# Patient Record
Sex: FEMALE | Race: WHITE | NOT HISPANIC OR LATINO | Employment: OTHER | URBAN - METROPOLITAN AREA
[De-identification: names, ages, dates, MRNs, and addresses within clinical notes are randomized per-mention and may not be internally consistent; named-entity substitution may affect disease eponyms.]

---

## 2017-01-11 ENCOUNTER — TELEPHONE (OUTPATIENT)
Dept: FAMILY MEDICINE | Facility: CLINIC | Age: 77
End: 2017-01-11

## 2017-01-11 NOTE — TELEPHONE ENCOUNTER
Sorry to send back.   Before we call patient---thoughts regarding patient's last question--request that patient's granddaughter and granddaughter's spouse est care with you??    Thank you,  Sumaya Doe RN, BSN

## 2017-01-11 NOTE — TELEPHONE ENCOUNTER
Placed call.  Information given to patient from PCP.  States understood and agreed with all of the advise.  Sumaya Doe RN

## 2017-01-11 NOTE — TELEPHONE ENCOUNTER
Reason for Call:  Other call back    Detailed comments: pt is looking to speak with nurse in regards to her taking her bp this morning just has some further questions in regards    Phone Number Patient can be reached at: Home number on file [phone] (home)    Best Time: anytime    Can we leave a detailed message on this number? YES    Call taken on 1/11/2017 at 10:56 AM by Thea Lemus

## 2017-01-11 NOTE — TELEPHONE ENCOUNTER
Agree her anxiety is raising hr and blood pressure and no worries with her readings, not to worry about stroke    Ok to continue vit d, but either way is fine    Office visit 3 months    Gonzales Ayoub M.D.

## 2017-01-11 NOTE — TELEPHONE ENCOUNTER
"PCP:     Patient calling with update on BP readings.   Has been taking her BP at home with an automatic cuff daily.   Recently started checking her BP twice daily and waiting 5-10 minutes between readings.   Today her BP was 136/85, after 10 minutes was 136/82    States that prior to this she noticed that her second readings were always lower than her initial readings.   \"My BP has dropped 10-15 points between the first and second reading\"  Last Saturday  systolic, after 10 minutes 128/82 HR 96.  Another reading started at 163/91, after 10 minutes 147/86 HR 97    Wants to know why her heartrate is so high.   States that before she checks her BP her heartrate is always 62-78    Has been climbing her stairs at home more.  Walking 15-30 minutes a day.   Feels like she has more energy.     Denies any chest pain, headache, vision change, dizziness.     Started crying a bit as she was talking. States that she is very fearful of the blood pressure cuff, doesn't like coming to the dr or talking to the clinic on the phone, \"it all makes me nervous\". Wonders if she should check her BP before she takes her meds.  States that her dad had a massive stroke and she is very fearful of having an elevated BP.  Encouraged Patient that she is doing a good job in being proactive with her health.     Advised that her anxiousness with the BP cuff is probably what is elevating her first BP reading and causing her HR to be elevated as it is not elevated when she is not checking it with her BP cuff. Advised to continue to check her BP an hour afer taking her BP medication (this is what she has been doing). Encouraged her to continue with her walking/exercising as she has been doing.     Wants to know:  1.What would be a good BP reading for her?  2.When does the PCP want to see her again or should she just continue to check her BP's at home?    Reviewed her medications with her and she states that she is not taking the Vitamin D because " her Vitamin D level was normal.   Advised that in this climate people typically do not get enough vitamin D naturally. She states that she was advised by PCP to take 1500IU of Vitamin D and that her endocrinologist said to take 2000IU.   3.Wants to know if she should be taking Vitamin D again and at what dose?    4.Also would like to know if PCP would see her granddaughter and granddaughter's  as new patients (age 40's). She states that they are both in good health but should have a PCP for physicals.     Please advise    Thank you    Elyssa Fuentes RN

## 2017-01-16 ENCOUNTER — TELEPHONE (OUTPATIENT)
Dept: FAMILY MEDICINE | Facility: CLINIC | Age: 77
End: 2017-01-16

## 2017-01-16 NOTE — TELEPHONE ENCOUNTER
PCP:    Pt takes her Losartan everyday at noon. Pt called in reporting a lot of anxiety surrounding her blood pressure.   Pt states her BP has been lower than normal this AM.    Pt reports her AM BP's have been:142/73, 130/81, 114/71,112/68. Pt takes BP multiple times per day.     Advised Pt these are healthy blood pressures and that taking her BP once per day is sufficient unless it is abnormally high or low.   Advised Pt she can take her Losartan today as scheduled, however Pt wants confirmation from PCP.     Malika Gill RN

## 2017-01-16 NOTE — TELEPHONE ENCOUNTER
Called and relayed the below information to the Pt. Pt agrees with plan of care.     Malika Gill RN

## 2017-01-16 NOTE — TELEPHONE ENCOUNTER
Reason for Call:  Call back    Detailed comments: Patient call and would like a call back from a nurse concerning her BP    Phone Number Patient can be reached at: Home number on file [phone] (home)    Best Time: before noon    Can we leave a detailed message on this number?YES    Call taken on 1/16/2017 at 9:00 AM by Candace Connor

## 2017-02-16 ENCOUNTER — TELEPHONE (OUTPATIENT)
Dept: FAMILY MEDICINE | Facility: CLINIC | Age: 77
End: 2017-02-16

## 2017-02-16 NOTE — TELEPHONE ENCOUNTER
Patient calling and states she took her BP today 20 minutes ago and was 163/94.  States sat for 15 minutes watching TV prior to taking and had taken the losartan today at noon.  Had 2 cups of caffeine today prior to BP reading as well.      Reports taking Losartan at noon daily as ordered since Jan. 8th.  Dieting, cutting down on sodium and walking.    Denies: headaches, blurred vision, nausea, vomiting, chest pain, severe weakness, dizziness, lightheadedness, does report family history of heart disease and strokes.    Patient very anxious about elevated number.  Reassurance provided regarding lack of symptoms, caffeine constriction and anxiety related to BP will elevate some.  Advised to do some relaxation.   Patient questioning if machine is working well.  Advised could do a nurse only BP check in office to verify readings for her concern.      Please advise with follow up recommendations for patient.   Mary Murphy RN  [employer]

## 2017-02-16 NOTE — TELEPHONE ENCOUNTER
Patient notified with information noted below from provider and agrees with plan.  Patient did not schedule appointment due to needing to find a ride and will call when she has ride available to schedule her BP check.  Mary Murphy RN  [employer]

## 2017-02-16 NOTE — TELEPHONE ENCOUNTER
Reason for call:  Patient reporting a symptom    Symptom or request: high BP    Duration (how long have symptoms been present): 24 hours    Have you been treated for this before? Yes    Additional comments:     Phone Number patient can be reached at:  Home number on file [phone] (home)    Best Time:      Can we leave a detailed message on this number:  YES    Call taken on 2/16/2017 at 1:53 PM by Jamila Price

## 2017-03-03 ENCOUNTER — ALLIED HEALTH/NURSE VISIT (OUTPATIENT)
Dept: NURSING | Facility: CLINIC | Age: 77
End: 2017-03-03
Payer: COMMERCIAL

## 2017-03-03 VITALS — DIASTOLIC BLOOD PRESSURE: 86 MMHG | SYSTOLIC BLOOD PRESSURE: 138 MMHG

## 2017-03-03 DIAGNOSIS — I10 BENIGN ESSENTIAL HYPERTENSION: Primary | ICD-10-CM

## 2017-03-03 PROCEDURE — 99207 ZZC NO CHARGE NURSE ONLY: CPT

## 2017-03-03 NOTE — PROGRESS NOTES
Arline Link is a 76 year old female who comes in today for a Blood Pressure check because of ongoing blood pressure monitoring.    *Document pulse and BP  *Use new set of vitals button for multiple readings.  *Use extended vitals for orthostatic    Vitals as recorded, a regular cuff was used.    Patient is taking medication as prescribed  Patient is tolerating medications well.  Patient is monitoring Blood Pressure at home.  Average readings if yes are varied.  Patient states she was told to take her bp every day, which is really causing her anxiety to increase so she does not want to do it daily.  We agreed on 1 or 2 times a week instead.  She was very anxious and almost in tears.  She scheduled an appointment for a f/u in March.     Current complaints: none    Disposition: follow-up as indicated by MD/AP, results routed to MD/AP, patient reminded to call as needed and patient to continue with the same medication.     Berkley Navas MA

## 2017-03-03 NOTE — MR AVS SNAPSHOT
"              After Visit Summary   3/3/2017    Arline Link    MRN: [id number]           Patient Information     Date Of Birth          1940        Visit Information        Provider Department      3/3/2017 1:15 PM CS NURSE [facility]        Today's Diagnoses     Benign essential hypertension    -  1       Follow-ups after your visit        Your next 10 appointments already scheduled     Mar 17, 2017  3:00 PM CDT   [facility]    [address] [facility] [address] [phone]           Bring a current list of meds and any records pertaining to this visit.  For Physicals, please bring immunization records and any forms needing to be filled out.  Please arrive 10 minutes early to complete paperwork.              Who to contact     If you have questions or need follow up information about today's clinic visit or your schedule please contact [facility] directly at [phone].  Normal or non-critical lab and imaging results will be communicated to you by [employer], letter or phone within 4 business days after the clinic has received the results. If you do not hear from us within 7 days, please contact the clinic through [employer] or phone. If you have a critical or abnormal lab result, we will notify you by phone as soon as possible.  Submit refill requests through [employer] or call your pharmacy and they will forward the refill request to us. Please allow 3 business days for your refill to be completed.          Additional Information About Your Visit        [employer] Information     [employer] lets you send messages to your doctor, view your test results, renew your prescriptions, schedule appointments and more. To sign up, go to [email or web address] . Click on \"Log in\" on the left side of the screen, which will take you to the Welcome page. Then click on \"Sign up Now\" on the right side of the page. "     You will be asked to enter the access code listed below, as well as some personal information. Please follow the directions to create your username and password.     Your access code is: [email or web address]  Expires: 3/16/2017 10:54 AM     Your access code will  in 90 days. If you need help or a new code, please call your [facility] clinic or [phone].        Care EveryWhere ID     This is your Care EveryWhere ID. This could be used by other organizations to access your [facility] medical records  [id number]         Blood Pressure from Last 3 Encounters:   17 138/86   16 180/90   16 145/80    Weight from Last 3 Encounters:   16 172 lb 1.6 oz (78.1 kg)   16 174 lb (78.9 kg)   01/30/15 167 lb (75.8 kg)              Today, you had the following     No orders found for display       Primary Care Provider Office Phone # Fax #    Gonzales Hector Ayoub [phone]       [facility] [address] [facility] [address]  [place] [address]        Thank you!     Thank you for choosing [facility]  for your care. Our goal is always to provide you with excellent care. Hearing back from our patients is one way we can continue to improve our services. Please take a few minutes to complete the written survey that you may receive in the mail after your visit with us. Thank you!             Your Updated Medication List - Protect others around you: Learn how to safely use, store and throw away your medicines at www.disposemymeds.org.          This list is accurate as of: 3/3/17  1:37 PM.  Always use your most recent med list.                   Brand Name Dispense Instructions for use    losartan 50 MG tablet    COZAAR    90 tablet    Take 1 tablet (50 mg) by mouth daily       SYNTHROID PO      Take 50 mcg by mouth       VITAMIN D (CHOLECALCIFEROL) PO      Take 1,500 Units by mouth daily

## 2017-03-10 ENCOUNTER — TELEPHONE (OUTPATIENT)
Dept: FAMILY MEDICINE | Facility: CLINIC | Age: 77
End: 2017-03-10

## 2017-03-10 NOTE — TELEPHONE ENCOUNTER
I would doubt the losartan but if persists or worsens let me know, otherwise office visit    Gonzales Ayoub M.D.

## 2017-03-10 NOTE — TELEPHONE ENCOUNTER
I called patient and spoke with her  Relayed provider message.  She is also trying to reach out to Endo doctor to see if the synthroid is causing these aches.   She also reports of not taking Vitamin D daily.   She takes it very infrequently.   Patient wondering if lack of Vitamin D could be causing these symptoms  Told patient I would run past Dr. Ayoub and see.     Please advise.     Nat Castanon RN

## 2017-03-10 NOTE — TELEPHONE ENCOUNTER
Reason for Call:  Other call back    Detailed comments: pt is calling to speak with nurse in regards to losartan (COZAAR) 50 MG tablet has questions    Phone Number Patient can be reached at: Home number on file [phone] (home)    Best Time: anytime    Can we leave a detailed message on this number? YES    Call taken on 3/10/2017 at 10:51 AM by Thea Lemus

## 2017-03-10 NOTE — TELEPHONE ENCOUNTER
I called and spoke with patient.   She said she will be better about taking Vitamin D. She will take 2,000 units per day  Also advised she could try Tylenol for body aches as well.   Patient said she will give it a try.    Nat Castanon RN

## 2017-03-10 NOTE — TELEPHONE ENCOUNTER
Spoke with patient.  Bilateral aching lower arms, worse in morning.  Pain improves over the course of the day, but never completely resolves.  Duration: about 2 weeks ago  BP has been running 140s-150s/xx. BP in clinic last Friday was 138/86.  Denies chest pain, SOB, lightheadedness and dizziness.  She is scheduled to see PCP 3/31.    She wonders if arm aching is due to thyroid issue or losartan?   States thyroid tests were done 1 month ago by [facility] and were normal.    Malika Odonnell RN

## 2017-04-14 ENCOUNTER — OFFICE VISIT (OUTPATIENT)
Dept: FAMILY MEDICINE | Facility: CLINIC | Age: 77
End: 2017-04-14
Payer: COMMERCIAL

## 2017-04-14 VITALS
TEMPERATURE: 97.6 F | OXYGEN SATURATION: 97 % | BODY MASS INDEX: 28.54 KG/M2 | WEIGHT: 161.1 LBS | DIASTOLIC BLOOD PRESSURE: 86 MMHG | HEIGHT: 63 IN | SYSTOLIC BLOOD PRESSURE: 166 MMHG | HEART RATE: 71 BPM

## 2017-04-14 DIAGNOSIS — I10 BENIGN ESSENTIAL HYPERTENSION: ICD-10-CM

## 2017-04-14 DIAGNOSIS — M79.10 MYALGIA: Primary | ICD-10-CM

## 2017-04-14 DIAGNOSIS — E55.9 VITAMIN D DEFICIENCY: ICD-10-CM

## 2017-04-14 DIAGNOSIS — E03.9 HYPOTHYROIDISM, UNSPECIFIED TYPE: ICD-10-CM

## 2017-04-14 LAB
BASOPHILS # BLD AUTO: 0.1 10E9/L (ref 0–0.2)
BASOPHILS NFR BLD AUTO: 0.6 %
CRP SERPL-MCNC: 38 MG/L (ref 0–8)
DIFFERENTIAL METHOD BLD: NORMAL
EOSINOPHIL # BLD AUTO: 0 10E9/L (ref 0–0.7)
EOSINOPHIL NFR BLD AUTO: 0.5 %
ERYTHROCYTE [DISTWIDTH] IN BLOOD BY AUTOMATED COUNT: 12.5 % (ref 10–15)
ERYTHROCYTE [SEDIMENTATION RATE] IN BLOOD BY WESTERGREN METHOD: 27 MM/H (ref 0–30)
HCT VFR BLD AUTO: 42.2 % (ref 35–47)
HGB BLD-MCNC: 14 G/DL (ref 11.7–15.7)
LYMPHOCYTES # BLD AUTO: 1.4 10E9/L (ref 0.8–5.3)
LYMPHOCYTES NFR BLD AUTO: 17.6 %
MCH RBC QN AUTO: 30.3 PG (ref 26.5–33)
MCHC RBC AUTO-ENTMCNC: 33.2 G/DL (ref 31.5–36.5)
MCV RBC AUTO: 91 FL (ref 78–100)
MONOCYTES # BLD AUTO: 0.9 10E9/L (ref 0–1.3)
MONOCYTES NFR BLD AUTO: 11 %
NEUTROPHILS # BLD AUTO: 5.8 10E9/L (ref 1.6–8.3)
NEUTROPHILS NFR BLD AUTO: 70.3 %
PLATELET # BLD AUTO: 404 10E9/L (ref 150–450)
RBC # BLD AUTO: 4.62 10E12/L (ref 3.8–5.2)
WBC # BLD AUTO: 8.2 10E9/L (ref 4–11)

## 2017-04-14 PROCEDURE — 80053 COMPREHEN METABOLIC PANEL: CPT | Performed by: INTERNAL MEDICINE

## 2017-04-14 PROCEDURE — 82306 VITAMIN D 25 HYDROXY: CPT | Performed by: INTERNAL MEDICINE

## 2017-04-14 PROCEDURE — 36415 COLL VENOUS BLD VENIPUNCTURE: CPT | Performed by: INTERNAL MEDICINE

## 2017-04-14 PROCEDURE — 86140 C-REACTIVE PROTEIN: CPT | Performed by: INTERNAL MEDICINE

## 2017-04-14 PROCEDURE — 85652 RBC SED RATE AUTOMATED: CPT | Performed by: INTERNAL MEDICINE

## 2017-04-14 PROCEDURE — 82550 ASSAY OF CK (CPK): CPT | Performed by: INTERNAL MEDICINE

## 2017-04-14 PROCEDURE — 85025 COMPLETE CBC W/AUTO DIFF WBC: CPT | Performed by: INTERNAL MEDICINE

## 2017-04-14 PROCEDURE — 84443 ASSAY THYROID STIM HORMONE: CPT | Performed by: INTERNAL MEDICINE

## 2017-04-14 PROCEDURE — 99214 OFFICE O/P EST MOD 30 MIN: CPT | Performed by: INTERNAL MEDICINE

## 2017-04-14 NOTE — NURSING NOTE
"Chief Complaint   Patient presents with     RECHECK       Initial Pulse 71  Temp 97.6  F (36.4  C) (Oral)  Ht 5' 2.75\" (1.594 m)  Wt 161 lb 1.6 oz (73.1 kg)  SpO2 97%  Breastfeeding? No  BMI 28.77 kg/m2 Estimated body mass index is 28.77 kg/(m^2) as calculated from the following:    Height as of this encounter: 5' 2.75\" (1.594 m).    Weight as of this encounter: 161 lb 1.6 oz (73.1 kg).  Medication Reconciliation: complete.  Delmi Ruiz CMA    "

## 2017-04-14 NOTE — LETTER
[address] [facility]  [address]  [place]  [address]  Tel: [phone]    April 19, 2017    Arline Link  [address] [facility] [address]        Dear MsBenjamin Slaughtereleuterioar,    It was a pleasure seeing you.  I wanted to get back to you with your test results.  I have enclosed a copy for your records.    As you should know for the most part your labs look very normal, including your complete blood count, muscle enzyme, thyroid test, vitamin D level, chemistries and sed rate or test for inflammation.  Your other inflammatory test is high, the crp and the calcium is just barely off.  I am not worried about either but you should know to come back to repeat these.      I would suggest seeing a rheumatologist to see what else might be causing the pains.  Please let me know if this is ok      Sincerely,    Gonzales Ayoub MD /deandra          Enclosure: Lab Results

## 2017-04-14 NOTE — PROGRESS NOTES
Arline Link is a 76 year old female who presents for follow up hypertension, other issues.  Very anxious today and hard to direct her, but able to as best I could.  As noted, has hypertension on losartan and taking it reg, now working out although less lately, weight down vol.  She is not checking blood pressure reg, last here fine.       She notes since Feb has myalgias of arms and legs, come and go.  No back pain or loss of control b/b fxn, no t/n, ?somne weakness.  NO  Ha, no jaw han or shoulder pain or hip pain.  Occasionally some wrist pain but not bad.  NO f,c,s.  No chest pain or shortness of breath, no gi or gu c/o.  Skin unchanged.  Aches come and go, feels like she worked out too much.  She notes harder to get onto and off toilet, but today able to stand from chair without use of arms, gets on and off table without help.      Past Medical History:   Diagnosis Date     Benign essential hypertension 2016    started med by endocrine 11/16     Elevated ALT measurement 2015    fu nl 12/16     Hypercholesteremia      Hypothyroid 2012    Dr. Schmitt     Mucinosis of skin     Dr. Navas     Vitamin D deficiency      Past Surgical History:   Procedure Laterality Date     APPENDECTOMY  14     TONSILLECTOMY & ADENOIDECTOMY  5     Social History     Social History     Marital status:      Spouse name: N/A     Number of children: 4     Years of education: N/A     Occupational History     Not on file.     Social History Main Topics     Smoking status: Never Smoker     Smokeless tobacco: Never Used     Alcohol use No     Drug use: No     Sexual activity: Not Currently     Other Topics Concern     Not on file     Social History Narrative     Current Outpatient Prescriptions   Medication Sig Dispense Refill     losartan (COZAAR) 50 MG tablet Take 1 tablet (50 mg) by mouth daily 90 tablet 3     Levothyroxine Sodium (SYNTHROID PO) Take 50 mcg by mouth       VITAMIN D, CHOLECALCIFEROL, PO Take 1,500 Units by  "mouth daily       Allergies   Allergen Reactions     Sulfa Drugs Unknown     Tetracycline Rash     FAMILY HISTORY NOTED AND REVIEWED    REVIEW OF SYSTEMS: above    PHYSICAL EXAM    /86  Pulse 71  Temp 97.6  F (36.4  C) (Oral)  Ht 5' 2.75\" (1.594 m)  Wt 161 lb 1.6 oz (73.1 kg)  SpO2 97%  Breastfeeding? No  BMI 28.77 kg/m2    Patient appears non toxic  No muscle tenderness or palp  Mouth within normal limits  Eyes within normal limits  Neck within normal limits  Thyroid within normal limits  No supraclavicular, cervical or axillary lymphadenopathy  Lungs clear, normal flow  cv reglar rate and rhythm, nomrg, no jvp or edema  Abdomen non-tender, no mgr, no hepatosplenomegaly  Intact cms or arms and legs  Gait within normal limits, able to get on and off table without help, can get up from chair without using arms    Labs sent    ASSESSMENT:  1. Myalgias of unclear cause, not on statin or supplements, doubt myositis but will check.  She believes is the thyroid, doubt that but will check.  Will start with labs and could consider rheum eval  2. Hypertension control poor but extemely anxious today  3. Hypothyroid  4. Low vit d    PLAN:  Labs today  Same meds for now  Follow up by phone    D/w patient and daughter time spent over 25 min    Gonzales Ayoub M.D.        "

## 2017-04-14 NOTE — MR AVS SNAPSHOT
"              After Visit Summary   2017    Arline Link    MRN: [id number]           Patient Information     Date Of Birth          1940        Visit Information        Provider Department      2017 11:00 AM Gonzales Ayobu MD [facility]        Today's Diagnoses     Myalgia    -  1    Benign essential hypertension        Hypothyroidism, unspecified type        Vitamin D deficiency           Follow-ups after your visit        Who to contact     If you have questions or need follow up information about today's clinic visit or your schedule please contact [facility] directly at [phone].  Normal or non-critical lab and imaging results will be communicated to you by [employer], letter or phone within 4 business days after the clinic has received the results. If you do not hear from us within 7 days, please contact the clinic through [employer] or phone. If you have a critical or abnormal lab result, we will notify you by phone as soon as possible.  Submit refill requests through [employer] or call your pharmacy and they will forward the refill request to us. Please allow 3 business days for your refill to be completed.          Additional Information About Your Visit        MyChart Information     [employer] lets you send messages to your doctor, view your test results, renew your prescriptions, schedule appointments and more. To sign up, go to [email or web address] . Click on \"Log in\" on the left side of the screen, which will take you to the Welcome page. Then click on \"Sign up Now\" on the right side of the page.     You will be asked to enter the access code listed below, as well as some personal information. Please follow the directions to create your username and password.     Your access code is: [email or web address]  Expires: 2017 11:24 AM     Your access code will  in 90 days. If you need help or a new code, please call your [facility] or [phone].      " "  Care EveryWhere ID     This is your Care EveryWhere ID. This could be used by other organizations to access your [facility] medical records  [id number]        Your Vitals Were     Pulse Temperature Height Pulse Oximetry Breastfeeding? BMI (Body Mass Index)    71 97.6  F (36.4  C) (Oral) 5' 2.75\" (1.594 m) 97% No 28.77 kg/m2       Blood Pressure from Last 3 Encounters:   04/14/17 166/86   03/03/17 138/86   12/16/16 180/90    Weight from Last 3 Encounters:   04/14/17 161 lb 1.6 oz (73.1 kg)   12/16/16 172 lb 1.6 oz (78.1 kg)   11/25/16 174 lb (78.9 kg)              We Performed the Following     CBC with platelets differential     CK total     Comprehensive metabolic panel     CRP, inflammation     ESR: Erythrocyte sedimentation rate     TSH with free T4 reflex     Vitamin D Deficiency        Primary Care Provider Office Phone # Fax #    Gonzales Ayoub [phone]       [facility] [address] [facility] [address] [facility] [address]  [facility] [address]        Thank you!     Thank you for choosing [facility]  for your care. Our goal is always to provide you with excellent care. Hearing back from our patients is one way we can continue to improve our services. Please take a few minutes to complete the written survey that you may receive in the mail after your visit with us. Thank you!             Your Updated Medication List - Protect others around you: Learn how to safely use, store and throw away your medicines at www.disposemymeds.org.          This list is accurate as of: 4/14/17 11:24 AM.  Always use your most recent med list.                   Brand Name Dispense Instructions for use    losartan 50 MG tablet    COZAAR    90 tablet    Take 1 tablet (50 mg) by mouth daily       SYNTHROID PO      Take 50 mcg by mouth       VITAMIN D (CHOLECALCIFEROL) PO      Take 1,500 Units by mouth daily         "

## 2017-04-15 LAB
ALBUMIN SERPL-MCNC: 3.9 G/DL (ref 3.4–5)
ALP SERPL-CCNC: 84 U/L (ref 40–150)
ALT SERPL W P-5'-P-CCNC: 27 U/L (ref 0–50)
ANION GAP SERPL CALCULATED.3IONS-SCNC: 12 MMOL/L (ref 3–14)
AST SERPL W P-5'-P-CCNC: 15 U/L (ref 0–45)
BILIRUB SERPL-MCNC: 0.5 MG/DL (ref 0.2–1.3)
BUN SERPL-MCNC: 13 MG/DL (ref 7–30)
CALCIUM SERPL-MCNC: 10.2 MG/DL (ref 8.5–10.1)
CHLORIDE SERPL-SCNC: 101 MMOL/L (ref 94–109)
CK SERPL-CCNC: 34 U/L (ref 30–225)
CO2 SERPL-SCNC: 23 MMOL/L (ref 20–32)
CREAT SERPL-MCNC: 0.72 MG/DL (ref 0.52–1.04)
GFR SERPL CREATININE-BSD FRML MDRD: 79 ML/MIN/1.7M2
GLUCOSE SERPL-MCNC: 101 MG/DL (ref 70–99)
POTASSIUM SERPL-SCNC: 4.6 MMOL/L (ref 3.4–5.3)
PROT SERPL-MCNC: 7.7 G/DL (ref 6.8–8.8)
SODIUM SERPL-SCNC: 136 MMOL/L (ref 133–144)
TSH SERPL DL<=0.005 MIU/L-ACNC: 1.43 MU/L (ref 0.4–4)

## 2017-04-17 LAB — DEPRECATED CALCIDIOL+CALCIFEROL SERPL-MC: 38 UG/L (ref 20–75)

## 2017-04-18 ENCOUNTER — TELEPHONE (OUTPATIENT)
Dept: FAMILY MEDICINE | Facility: CLINIC | Age: 77
End: 2017-04-18

## 2017-04-18 DIAGNOSIS — E83.52 SERUM CALCIUM ELEVATED: ICD-10-CM

## 2017-04-18 DIAGNOSIS — R79.82 ELEVATED C-REACTIVE PROTEIN (CRP): Primary | ICD-10-CM

## 2017-04-19 NOTE — TELEPHONE ENCOUNTER
Called and relayed the below information to the Pt. Pt is agreeable to plan of care.     Malika Gill RN

## 2017-04-19 NOTE — TELEPHONE ENCOUNTER
That would be ok, but also there is a [place] rheumatologist who we can probably get her in with faster at [facility]    Thanks    Gonzales Ayoub M.D.

## 2017-04-19 NOTE — TELEPHONE ENCOUNTER
Please call patient.  For the most part labs all normal, vit diabetes, thyroid, sugar test for diabetes, proteins, liver, kidney tests.  Her calcium is just a bit above normal, probably due to medications and one of her inflammatory tests is elevated.  I am not worried about either but to be safe I would like her to come back just for a non fasting lab next week.    How is she feeling?  Please let me know.    Thanks    Gonzales Ayoub M.D.

## 2017-04-19 NOTE — PROGRESS NOTES
It was a pleasure seeing you.  I wanted to get back to you with your test results.  I have enclosed a copy for your records.    As you should know for the most part your labs look very normal, including your complete blood count, muscle enzyme, thyroid test, vitamin D level, chemistries and sed rate or test for inflammation.  Your other inflammatory test is high, the crp and the calcium is just barely off.  I am not worried about either but you should know to come back to repeat these.      I would suggest seeing a rheumatologist to see what else might be causing the pains.  Please let me know if this is ok

## 2017-04-19 NOTE — TELEPHONE ENCOUNTER
PCP:    Was this the clinic you were thinking of:    [facility]. Any specific provider you recommend?     Malika Gill RN

## 2017-04-19 NOTE — TELEPHONE ENCOUNTER
No, I do not believe they are thyroid related at all.  I would suggest we have rheumatology see patient to see what else this may be.  Please let me know if that would be ok.  Their office is in [place] I believe    Thanks    Gonzales Ayoub M.D.

## 2017-04-19 NOTE — TELEPHONE ENCOUNTER
"To PCP:  Patient is still feeling very \"achy\".  Tylenol does help.  Symptoms are not worse, but not better.  She is wondering if it could be thyroid related still.  Will also call Zenaida Schmitt.  She agrees to come in for follow up labs.  Renuka Cueto RN    "

## 2017-04-21 ENCOUNTER — TRANSFERRED RECORDS (OUTPATIENT)
Dept: HEALTH INFORMATION MANAGEMENT | Facility: CLINIC | Age: 77
End: 2017-04-21

## 2017-04-21 LAB — TSH SERPL-ACNC: 1.88 UIU/ML (ref 0.3–5)

## 2017-04-25 ENCOUNTER — TRANSFERRED RECORDS (OUTPATIENT)
Dept: HEALTH INFORMATION MANAGEMENT | Facility: CLINIC | Age: 77
End: 2017-04-25

## 2017-04-26 ENCOUNTER — TRANSFERRED RECORDS (OUTPATIENT)
Dept: HEALTH INFORMATION MANAGEMENT | Facility: CLINIC | Age: 77
End: 2017-04-26

## 2017-05-01 ENCOUNTER — TELEPHONE (OUTPATIENT)
Dept: FAMILY MEDICINE | Facility: CLINIC | Age: 77
End: 2017-05-01

## 2017-05-01 NOTE — TELEPHONE ENCOUNTER
Letter not scanned in yet, but if I am not mistaken it said to leave things the same, but she should double check with that doctor if not sure    Gonzales Ayoub M.D.

## 2017-05-01 NOTE — TELEPHONE ENCOUNTER
Reason for Call:  Other Letter     Detailed comments: Patient endocrinologist sent letter regarding thyroid? Discuss results does she need to re do any labs?    Phone Number Patient can be reached at: Home number on file [phone] (home)    Best Time: anytime     Can we leave a detailed message on this number? YES    Call taken on 5/1/2017 at 9:22 AM by Popeye Webster

## 2017-05-01 NOTE — TELEPHONE ENCOUNTER
Called patient.   Patient states she just wanted to confirm that she needs to repeat 2 lab tests:  Calcium and CRP.    Per Dr Ayoub's lab result notes from 4-14-17---he advised she repeat those tests and placed order.     Patient agreed to schedule lab appointment this week.  Didn't want to schedule at this time due to lack of transportation.    Sumaya Doe RN, BSN

## 2017-05-02 DIAGNOSIS — R79.82 ELEVATED C-REACTIVE PROTEIN (CRP): ICD-10-CM

## 2017-05-02 DIAGNOSIS — E83.52 SERUM CALCIUM ELEVATED: ICD-10-CM

## 2017-05-02 LAB
CA-I SERPL ISE-MCNC: 5.1 MG/DL (ref 4.4–5.2)
CRP SERPL-MCNC: 43 MG/L (ref 0–8)

## 2017-05-02 PROCEDURE — 36415 COLL VENOUS BLD VENIPUNCTURE: CPT | Performed by: INTERNAL MEDICINE

## 2017-05-02 PROCEDURE — 82330 ASSAY OF CALCIUM: CPT | Performed by: INTERNAL MEDICINE

## 2017-05-02 PROCEDURE — 86140 C-REACTIVE PROTEIN: CPT | Performed by: INTERNAL MEDICINE

## 2017-05-02 NOTE — LETTER
[facility]  [address]  [place] [address]  [phone]                                                                                               Date: 5/9/2017    Arline Link                                                                               [address] [facility] [address]              Dear Arline,    Your calcium level is back to normal which is good.  Your crp or inflammatory test remains just a bit elevated.  I am not worried as this test is very non specific.  Please see the arthritis doctor and let's see what they say.  I would like to see you back in 4 to 6 weeks to follow up.    Enclosed is a copy of your results.      It was a pleasure to see you at your last appointment. If you have any questions, please feel free to call myself or my nurse at [phone].          Sincerely,    Gonzales Ayoub MD/ Katy HAWKINS CMA

## 2017-05-09 NOTE — PROGRESS NOTES
Your calcium level is back to normal which is good.  Your crp or inflammatory test remains just a bit elevated.  I am not worried as this test is very non specific.  Please see the arthritis doctor and let's see what they say.  I would like to see you back in 4 to 6 weeks to follow up.    If you have any questions please call me.

## 2017-05-19 ENCOUNTER — TELEPHONE (OUTPATIENT)
Dept: FAMILY MEDICINE | Facility: CLINIC | Age: 77
End: 2017-05-19

## 2017-05-19 NOTE — TELEPHONE ENCOUNTER
These complaints are not associated with losartan use.  It appears that the patient started taking losartan in December 2016.  Based on how long ago this was started, it is more unlikely that these are related to losartan use.  The frequent urination and itching sound problematic; I encourage her to speak with Dr. Ayoub regarding these issues.    Roma Ludwig, Pharm.D.  Medication Therapy Management Pharmacist  Page/VM:  [phone]

## 2017-05-19 NOTE — TELEPHONE ENCOUNTER
I called patient and spoke with her.   She reports of vaginal itching ONLY AT NIGHT  Also frequent urination ONLY AT NIGHT  Itching and urination symptoms have been going on since 5/9/17  She does feel that she empties bladder entirely when urinating.   No burning while urinating.    Patient reports already having a hard time sleeping but now with frequent urination in middle of night causing more issues with sleeping.    Patient takes Losartan 12 PM daily. Patient is attributing these symptoms to her losartan.  Patient also takes Tylenol for aches and pains due to arthritis.     I asked about history of UTIs and patient denies having UTIs in the past and she feels again this is related to her Losartan rather than infection.     I told patient I can run symptoms past Dr. Ayoub and then get back to her    Nat Castanon RN

## 2017-05-19 NOTE — TELEPHONE ENCOUNTER
Reason for Call: Side effects to losartan (COZAAR) 50 MG tablet    Detailed comments: Arlien Link called and is having some side effects tolosartan (COZAAR) 50 MG tablet. She is having insomnia, frequent urination, and some itching. Arline Link is wondering if this is normal with the medication or what she should do.       Phone Number Patient can be reached at: Home number on file [phone] (home)    Best Time: ASAP     Can we leave a detailed message on this number? YES    Call taken on 5/19/2017 at 8:18 AM by Roma Dowell

## 2017-06-09 ENCOUNTER — OFFICE VISIT (OUTPATIENT)
Dept: FAMILY MEDICINE | Facility: CLINIC | Age: 77
End: 2017-06-09
Payer: COMMERCIAL

## 2017-06-09 ENCOUNTER — TELEPHONE (OUTPATIENT)
Dept: FAMILY MEDICINE | Facility: CLINIC | Age: 77
End: 2017-06-09

## 2017-06-09 VITALS
DIASTOLIC BLOOD PRESSURE: 90 MMHG | HEART RATE: 98 BPM | WEIGHT: 161 LBS | OXYGEN SATURATION: 96 % | TEMPERATURE: 97 F | SYSTOLIC BLOOD PRESSURE: 160 MMHG | BODY MASS INDEX: 28.75 KG/M2

## 2017-06-09 DIAGNOSIS — B37.9 ANTIBIOTIC-INDUCED YEAST INFECTION: ICD-10-CM

## 2017-06-09 DIAGNOSIS — N39.0 URINARY TRACT INFECTION WITHOUT HEMATURIA, SITE UNSPECIFIED: Primary | ICD-10-CM

## 2017-06-09 DIAGNOSIS — T36.95XA ANTIBIOTIC-INDUCED YEAST INFECTION: ICD-10-CM

## 2017-06-09 LAB
ALBUMIN UR-MCNC: NEGATIVE MG/DL
APPEARANCE UR: CLEAR
BILIRUB UR QL STRIP: NEGATIVE
COLOR UR AUTO: YELLOW
GLUCOSE UR STRIP-MCNC: NEGATIVE MG/DL
HGB UR QL STRIP: NEGATIVE
KETONES UR STRIP-MCNC: NEGATIVE MG/DL
LEUKOCYTE ESTERASE UR QL STRIP: ABNORMAL
NITRATE UR QL: NEGATIVE
NON-SQ EPI CELLS #/AREA URNS LPF: ABNORMAL /LPF
PH UR STRIP: 6.5 PH (ref 5–7)
RBC #/AREA URNS AUTO: ABNORMAL /HPF (ref 0–2)
RBC CASTS #/AREA URNS LPF: ABNORMAL /LPF
SP GR UR STRIP: 1.01 (ref 1–1.03)
URN SPEC COLLECT METH UR: ABNORMAL
UROBILINOGEN UR STRIP-ACNC: 1 EU/DL (ref 0.2–1)
WBC #/AREA URNS AUTO: ABNORMAL /HPF (ref 0–2)

## 2017-06-09 PROCEDURE — 99213 OFFICE O/P EST LOW 20 MIN: CPT | Performed by: INTERNAL MEDICINE

## 2017-06-09 PROCEDURE — 81001 URINALYSIS AUTO W/SCOPE: CPT | Performed by: INTERNAL MEDICINE

## 2017-06-09 RX ORDER — CIPROFLOXACIN 250 MG/1
250 TABLET, FILM COATED ORAL 2 TIMES DAILY
Qty: 6 TABLET | Refills: 0 | Status: SHIPPED | OUTPATIENT
Start: 2017-06-09 | End: 2017-07-07

## 2017-06-09 RX ORDER — FLUCONAZOLE 150 MG/1
150 TABLET ORAL ONCE
Qty: 1 TABLET | Refills: 0 | Status: SHIPPED | OUTPATIENT
Start: 2017-06-09 | End: 2017-06-09

## 2017-06-09 ASSESSMENT — ENCOUNTER SYMPTOMS
FEVER: 0
FLANK PAIN: 0
ABDOMINAL PAIN: 0
CHILLS: 0
HEMATURIA: 0
VOMITING: 0
NAUSEA: 0

## 2017-06-09 NOTE — PROGRESS NOTES
"UTI   This is a new problem. The current episode started in the past 7 days. The problem occurs daily. The problem has been unchanged. Pertinent negatives include no abdominal pain, chills, fever, nausea or vomiting. Associated symptoms comments: \"itching when she urinates\". She has tried nothing for the symptoms.       Past Medical History:   Diagnosis Date     Benign essential hypertension 2016    started med by endocrine 11/16     Elevated ALT measurement 2015    fu nl 12/16     Hypercholesteremia      Hypothyroid 2012    Dr. Schmitt     Mucinosis of skin     Dr. aNvas     Vitamin D deficiency        Review of Systems   Constitutional: Negative for chills and fever.   Gastrointestinal: Negative for abdominal pain, nausea and vomiting.   Genitourinary: Negative for flank pain and hematuria.       /90 (BP Location: Left arm, Cuff Size: Adult Regular)  Pulse 98  Temp 97  F (36.1  C) (Oral)  Ht (P) 5' 2.75\" (1.594 m)  Wt 161 lb (73 kg)  SpO2 96%  BMI (P) 28.75 kg/m2      Physical Exam   Constitutional: She is oriented to person, place, and time. No distress.   Abdominal: Soft. There is no tenderness.   Genitourinary:   Genitourinary Comments: No CVA tenderness   Neurological: She is alert and oriented to person, place, and time. GCS score is 15.   Vitals reviewed.        ICD-10-CM    1. Urinary tract infection without hematuria, site unspecified N39.0 *UA reflex to Microscopic and Culture ([place] [facility] (except [place] and [place])     Urine Microscopic     ciprofloxacin (CIPRO) 250 MG tablet   2. Antibiotic-induced yeast infection B37.9 fluconazole (DIFLUCAN) 150 MG tablet    T36.95XA        Patient Instructions   Call doctor if your symptoms persist/worsens, or if you develop new symptoms.        "

## 2017-06-09 NOTE — NURSING NOTE
"Chief Complaint   Patient presents with     UTI     pt c/o vaginal itching that keeps her up at night; some frequency during the day; is wondering if her losartan is causing it       Initial /90 (BP Location: Left arm, Cuff Size: Adult Regular)  Pulse 98  Temp 97  F (36.1  C) (Oral)  Ht (P) 5' 2.75\" (1.594 m)  Wt 161 lb (73 kg)  SpO2 96%  BMI (P) 28.75 kg/m2 Estimated body mass index is 28.75 kg/(m^2) (pended) as calculated from the following:    Height as of this encounter: (P) 5' 2.75\" (1.594 m).    Weight as of this encounter: 161 lb (73 kg).  Medication Reconciliation: complete     Berkley Navas MA    "

## 2017-06-09 NOTE — TELEPHONE ENCOUNTER
Reason for Call:  Same Day Appointment, Requested Provider:  Gonzales Ayoub MD    PCP: Gonzales Ayoub    Reason for visit: possible UTI?    Duration of symptoms: close to a month    Additional comments: pt. Scheduled with Dr. Rodríguez this afternoon at 2:30.  This was the only opening for today.  Would you like to see her?    Can we leave a detailed message on this number? YES    Phone number patient can be reached at: Home number on file [phone] (home)    Best Time: any time    Call taken on 6/9/2017 at 10:55 AM by Suzanne Bolanos.

## 2017-06-09 NOTE — MR AVS SNAPSHOT
"              After Visit Summary   2017    Arline Link    MRN: [id number]           Patient Information     Date Of Birth          1940        Visit Information        Provider Department      2017 2:30 PM Ghassan Rodríguez MD [facility]        Today's Diagnoses     Vaginal itching    -  1      Care Instructions    Call doctor if your symptoms persist/worsens, or if you develop new symptoms.          Follow-ups after your visit        Who to contact     If you have questions or need follow up information about today's clinic visit or your schedule please contact [facility] directly at [phone].  Normal or non-critical lab and imaging results will be communicated to you by MyChart, letter or phone within 4 business days after the clinic has received the results. If you do not hear from us within 7 days, please contact the clinic through MyChart or phone. If you have a critical or abnormal lab result, we will notify you by phone as soon as possible.  Submit refill requests through [employer] or call your pharmacy and they will forward the refill request to us. Please allow 3 business days for your refill to be completed.          Additional Information About Your Visit        MyChart Information     [employer] lets you send messages to your doctor, view your test results, renew your prescriptions, schedule appointments and more. To sign up, go to [email or web address] . Click on \"Log in\" on the left side of the screen, which will take you to the Welcome page. Then click on \"Sign up Now\" on the right side of the page.     You will be asked to enter the access code listed below, as well as some personal information. Please follow the directions to create your username and password.     Your access code is: [email or web address]  Expires: 2017 11:24 AM     Your access code will  in 90 days. If you need help or a new code, please call your [place] clinic or " [phone].        Care EveryWhere ID     This is your Care EveryWhere ID. This could be used by other organizations to access your [place] medical records  [id number]        Your Vitals Were     Pulse Temperature Pulse Oximetry BMI (Body Mass Index)          98 97  F (36.1  C) (Oral) 96% 28.75 kg/m2         Blood Pressure from Last 3 Encounters:   06/09/17 160/90   04/14/17 166/86   03/03/17 138/86    Weight from Last 3 Encounters:   06/09/17 161 lb (73 kg)   04/14/17 161 lb 1.6 oz (73.1 kg)   12/16/16 172 lb 1.6 oz (78.1 kg)              We Performed the Following     *UA reflex to Microscopic and Culture ([place] [facility] [place] [facility] (except [place] and [place])     Urine Microscopic          Today's Medication Changes          These changes are accurate as of: 6/9/17  3:08 PM.  If you have any questions, ask your nurse or doctor.               Start taking these medicines.        Dose/Directions    ciprofloxacin 250 MG tablet   Commonly known as:  CIPRO   Used for:  Vaginal itching   Started by:  Ghassan Rodríguez MD        Dose:  250 mg   Take 1 tablet (250 mg) by mouth 2 times daily   Quantity:  6 tablet   Refills:  0       fluconazole 150 MG tablet   Commonly known as:  DIFLUCAN   Used for:  Vaginal itching   Started by:  Ghassan Rodríguez MD        Dose:  150 mg   Take 1 tablet (150 mg) by mouth once for 1 dose Take at the 3rd day of your antibiotic treatment   Quantity:  1 tablet   Refills:  0            Where to get your medicines      These medications were sent to [facility] [place] - [address] AT [facility] [address] [place] [address] [address]     Phone:  [phone]     ciprofloxacin 250 MG tablet    fluconazole 150 MG tablet                Primary Care Provider Office Phone # Fax #    Gonzales Ayoub [phone]       [facility] [address]  [place] [address]         Thank you!     Thank you for choosing [facility]  for your care. Our goal is always to provide you with excellent care. Hearing back from our patients is one way we can continue to improve our services. Please take a few minutes to complete the written survey that you may receive in the mail after your visit with us. Thank you!             Your Updated Medication List - Protect others around you: Learn how to safely use, store and throw away your medicines at www.disposemymeds.org.          This list is accurate as of: 6/9/17  3:08 PM.  Always use your most recent med list.                   Brand Name Dispense Instructions for use    ciprofloxacin 250 MG tablet    CIPRO    6 tablet    Take 1 tablet (250 mg) by mouth 2 times daily       fluconazole 150 MG tablet    DIFLUCAN    1 tablet    Take 1 tablet (150 mg) by mouth once for 1 dose Take at the 3rd day of your antibiotic treatment       losartan 50 MG tablet    COZAAR    90 tablet    Take 1 tablet (50 mg) by mouth daily       SYNTHROID PO      Take 50 mcg by mouth       VITAMIN D (CHOLECALCIFEROL) PO      Take 1,500 Units by mouth daily

## 2017-06-12 ENCOUNTER — TELEPHONE (OUTPATIENT)
Dept: FAMILY MEDICINE | Facility: CLINIC | Age: 77
End: 2017-06-12

## 2017-06-12 NOTE — TELEPHONE ENCOUNTER
Reason for Call:  rescription    Detailed comments: Patient has a question on Fluticasone and Ciprofloxacin  Please call to discuss    Phone Number Patient can be reached at: Home number on file [phone] (home)    Best Time: anytime    Can we leave a detailed message on this number? YES    Call taken on 6/12/2017 at 8:18 AM by Benito Escamilla

## 2017-06-15 ENCOUNTER — TELEPHONE (OUTPATIENT)
Dept: FAMILY MEDICINE | Facility: CLINIC | Age: 77
End: 2017-06-15

## 2017-06-15 NOTE — TELEPHONE ENCOUNTER
Patient state that she finished UIT med's on Monday and was doing fine until she started itching again and was up for 3 hours.  States took the Fluconazole on Monday.    Denies: blood, frequency, burning, urgency, fevers, chills, flank pain, pelvic pain, abdominal pian, discharge    Reports staying dry, and avoiding tubs, keeping area clean.    Wondering if there is further treatment for her itching.  Please advise,  Mary Murphy RN  Triage [employer]

## 2017-06-15 NOTE — TELEPHONE ENCOUNTER
Reason for call:  Other   Patient called regarding (reason for call): Questions she has for a nurse about her bladder infection. Patient did not specify any further. Please advise.   Additional comments: n/a    Phone number to reach patient:  Home number on file [phone] (home)    Best Time:  Today    Can we leave a detailed message on this number?  YES

## 2017-07-06 ENCOUNTER — TELEPHONE (OUTPATIENT)
Dept: FAMILY MEDICINE | Facility: CLINIC | Age: 77
End: 2017-07-06

## 2017-07-06 NOTE — TELEPHONE ENCOUNTER
Reason for Call:  Other appointment    Detailed comments: The patient received a letter asking her to F/U in 4 to 6 weeks   She called to schedule today and the first available is 8/4 she is fine with waiting but she wanted  To make sure that this is ok with Dr. Nicole this will be about 12 weeks out    Phone Number Patient can be reached at: Home number on file [phone] (home)    Best Time: anytime    Can we leave a detailed message on this number? YES    Call taken on 7/6/2017 at 3:24 PM by Lashell Casey

## 2017-07-07 ENCOUNTER — OFFICE VISIT (OUTPATIENT)
Dept: FAMILY MEDICINE | Facility: CLINIC | Age: 77
End: 2017-07-07
Payer: COMMERCIAL

## 2017-07-07 VITALS
DIASTOLIC BLOOD PRESSURE: 98 MMHG | BODY MASS INDEX: 28.7 KG/M2 | HEIGHT: 63 IN | OXYGEN SATURATION: 98 % | TEMPERATURE: 97.4 F | HEART RATE: 99 BPM | SYSTOLIC BLOOD PRESSURE: 160 MMHG | WEIGHT: 162 LBS

## 2017-07-07 DIAGNOSIS — N89.8 VAGINAL ITCHING: Primary | ICD-10-CM

## 2017-07-07 LAB
MICRO REPORT STATUS: NORMAL
SPECIMEN SOURCE: NORMAL
WET PREP SPEC: NORMAL

## 2017-07-07 PROCEDURE — 99213 OFFICE O/P EST LOW 20 MIN: CPT | Performed by: NURSE PRACTITIONER

## 2017-07-07 PROCEDURE — 87210 SMEAR WET MOUNT SALINE/INK: CPT | Performed by: NURSE PRACTITIONER

## 2017-07-07 NOTE — TELEPHONE ENCOUNTER
Routing to covering providers:    Per letter from 5/9/17:    Dear Arline,     Your calcium level is back to normal which is good.  Your crp or inflammatory test remains just a bit elevated.  I am not worried as this test is very non specific.  Please see the arthritis doctor and let's see what they say.  I would like to see you back in 4 to 6 weeks to follow up.     Do you want the Pt to come in for repeat labs?     Malika Gill RN

## 2017-07-07 NOTE — NURSING NOTE
"Chief Complaint   Patient presents with     Vaginal Problem     Pt reports ongoing vaginal itching and irritation that began in early June. Pt reports she did take Diflucan, with temporary relief, however symptoms have returned       Initial BP (!) 160/98 (BP Location: Right arm, Cuff Size: Adult Regular)  Pulse 99  Temp 97.4  F (36.3  C) (Oral)  Ht 5' 2.75\" (1.594 m)  Wt 162 lb (73.5 kg)  SpO2 98%  BMI 28.93 kg/m2 Estimated body mass index is 28.93 kg/(m^2) as calculated from the following:    Height as of this encounter: 5' 2.75\" (1.594 m).    Weight as of this encounter: 162 lb (73.5 kg).  Medication Reconciliation: complete.      Susi Salvador CMA      "

## 2017-07-07 NOTE — MR AVS SNAPSHOT
After Visit Summary   7/7/2017    Arline Link    MRN: [id number]           Patient Information     Date Of Birth          1940        Visit Information        Provider Department      7/7/2017 2:00 PM Lesley Guajardo APRN CNP [facility]        Today's Diagnoses     Vaginal itching    -  1       Follow-ups after your visit        Additional Services     OB/GYN REFERRAL       Your provider has referred you to:  TIFFANY: [facility] [place] [phone]   [email or web address]    Please be aware that coverage of these services is subject to the terms and limitations of your health insurance plan.  Call member services at your health plan with any benefit or coverage questions.      Please bring the following with you to your appointment:    (1) Any X-Rays, CTs or MRIs which have been performed.  Contact the facility where they were done to arrange for  prior to your scheduled appointment.   (2) List of current medications   (3) This referral request   (4) Any documents/labs given to you for this referral                  Your next 10 appointments already scheduled     Aug 04, 2017  1:00 PM CDT   Office Visit with Gonzales Ayoub MD   [facility]    [address] [facility] [address] [phone]           Bring a current list of meds and any records pertaining to this visit.  For Physicals, please bring immunization records and any forms needing to be filled out.  Please arrive 10 minutes early to complete paperwork.              Who to contact     If you have questions or need follow up information about today's clinic visit or your schedule please contact [facility] directly at [phone].  Normal or non-critical lab and imaging results will be communicated to you by MyChart, letter or phone within 4 business days after the clinic has received the results. If you  "do not hear from us within 7 days, please contact the clinic through [employer] or phone. If you have a critical or abnormal lab result, we will notify you by phone as soon as possible.  Submit refill requests through [employer] or call your pharmacy and they will forward the refill request to us. Please allow 3 business days for your refill to be completed.          Additional Information About Your Visit        Care EveryWhere ID     This is your Care EveryWhere ID. This could be used by other organizations to access your [place] medical records  [id number]        Your Vitals Were     Pulse Temperature Height Pulse Oximetry BMI (Body Mass Index)       99 97.4  F (36.3  C) (Oral) 5' 2.75\" (1.594 m) 98% 28.93 kg/m2        Blood Pressure from Last 3 Encounters:   07/07/17 (!) 160/98   06/09/17 160/90   04/14/17 166/86    Weight from Last 3 Encounters:   07/07/17 162 lb (73.5 kg)   06/09/17 161 lb (73 kg)   04/14/17 161 lb 1.6 oz (73.1 kg)              We Performed the Following     OB/GYN REFERRAL     Wet prep        Primary Care Provider Office Phone # Fax #    Gonzales Ayoub [phone]       [facility] [address] [address]        Equal Access to Services     [facility] AARCELY : Hadii aad ku hadasho Soomaali, waaxda luqadaha, qaybta kaalmada adeegyada, waxay mariela chambers . So [facility] [phone].    ATENCIÓN: Si habla español, tiene a rodriguez disposición servicios gratuitos de asistencia lingüística. Llame al [phone].    We comply with applicable federal civil rights laws and [place] laws. We do not discriminate on the basis of race, color, national origin, age, disability sex, sexual orientation or gender identity.            Thank you!     Thank you for choosing [facility]  for your care. Our goal is always to provide you with excellent care. Hearing back from our patients is one way we can continue to improve our services. " Please take a few minutes to complete the written survey that you may receive in the mail after your visit with us. Thank you!             Your Updated Medication List - Protect others around you: Learn how to safely use, store and throw away your medicines at www.disposemymeds.org.          This list is accurate as of: 7/7/17 11:59 PM.  Always use your most recent med list.                   Brand Name Dispense Instructions for use Diagnosis    losartan 50 MG tablet    COZAAR    90 tablet    Take 1 tablet (50 mg) by mouth daily    Benign essential hypertension       SYNTHROID PO      Take 50 mcg by mouth    Routine general medical examination at a health care facility       VITAMIN D (CHOLECALCIFEROL) PO      Take 1,500 Units by mouth daily    Routine general medical examination at a health care facility

## 2017-07-07 NOTE — TELEPHONE ENCOUNTER
Triage, could you please review the last visit note and let me know if this is   Ok or if I need to have the pt see team?

## 2017-07-07 NOTE — TELEPHONE ENCOUNTER
Spoke with the Pt. Relayed the below information.   Pt agrees with plan of care.     Pt reports ongoing vaginal itching and irritation that began in early June. (see appointment from 6/9 with Dr. Rodríguez)  Pt reports she did take PO Diflucan, with temporary relief, however symptoms have returned.   Denies any white discharge, states she does not want to take topical OTC Monistat.     TEAM appointment scheduled for today. Requesting a female provider.     Malika Gill RN

## 2017-07-07 NOTE — PROGRESS NOTES
"HPI    SUBJECTIVE:                                                    Arline Link is a 77 year old female who presents to clinic today for the following health issues:      Vaginal Symptoms      Duration: 2 weeks    Description  itching    Intensity:  moderate    Accompanying signs and symptoms (fever/dysuria/abdominal or back pain): None    History  Sexually active: not at present  Possibility of pregnancy: No  Recent antibiotic use: YES- Cipro    Precipitating or alleviating factors: None    Therapies tried and outcome: Diflucan   Outcome: NA      Vaginal itching for several hours at night  No discharge   No urinary changes   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 Schmitt     Mucinosis of skin     Dr. Navas     Vitamin D deficiency      Past Surgical History:   Procedure Laterality Date     APPENDECTOMY  14     TONSILLECTOMY & ADENOIDECTOMY  5     Social History   Substance Use Topics     Smoking status: Never Smoker     Smokeless tobacco: Never Used     Alcohol use No     Current Outpatient Prescriptions   Medication Sig Dispense Refill     losartan (COZAAR) 50 MG tablet Take 1 tablet (50 mg) by mouth daily 90 tablet 3     Levothyroxine Sodium (SYNTHROID PO) Take 50 mcg by mouth       VITAMIN D, CHOLECALCIFEROL, PO Take 1,500 Units by mouth daily       Allergies   Allergen Reactions     Sulfa Drugs Unknown     Tetracycline Rash       Reviewed and updated as needed this visit by clinical staff and provider      ROS  Detailed as above       BP (!) 160/98 (BP Location: Right arm, Cuff Size: Adult Regular)  Pulse 99  Temp 97.4  F (36.3  C) (Oral)  Ht 5' 2.75\" (1.594 m)  Wt 162 lb (73.5 kg)  SpO2 98%  BMI 28.93 kg/m2      Physical Exam   Constitutional: She is well-developed, well-nourished, and in " no distress.   HENT:   Head: Normocephalic.   Eyes: Conjunctivae are normal.   Pulmonary/Chest: Effort normal.   Genitourinary:   Genitourinary Comments: Normal external vaginal exam    Neurological: She is alert.   Psychiatric: Mood and affect normal.   Vitals reviewed.      Assessment and Plan:       ICD-10-CM    1. Vaginal itching L29.8 Wet prep     OB/GYN REFERRAL       Unknown etiology of vaginal and internal lower abd itching   Normal external vaginal exam and normal wet prep   Will send to ob/gyn for bhavesh Guajardo, APRN, CNP  [facility]

## 2017-08-04 ENCOUNTER — OFFICE VISIT (OUTPATIENT)
Dept: FAMILY MEDICINE | Facility: CLINIC | Age: 77
End: 2017-08-04
Payer: COMMERCIAL

## 2017-08-04 VITALS
SYSTOLIC BLOOD PRESSURE: 166 MMHG | TEMPERATURE: 97.6 F | OXYGEN SATURATION: 98 % | BODY MASS INDEX: 28.35 KG/M2 | HEIGHT: 63 IN | DIASTOLIC BLOOD PRESSURE: 82 MMHG | HEART RATE: 101 BPM | WEIGHT: 160 LBS

## 2017-08-04 DIAGNOSIS — I10 BENIGN ESSENTIAL HYPERTENSION: Primary | ICD-10-CM

## 2017-08-04 DIAGNOSIS — E03.9 HYPOTHYROIDISM, UNSPECIFIED TYPE: ICD-10-CM

## 2017-08-04 DIAGNOSIS — M79.10 MYALGIA: ICD-10-CM

## 2017-08-04 PROCEDURE — 99213 OFFICE O/P EST LOW 20 MIN: CPT | Performed by: INTERNAL MEDICINE

## 2017-08-04 NOTE — MR AVS SNAPSHOT
"              After Visit Summary   8/4/2017    Arline Link    MRN: [id number]           Patient Information     Date Of Birth          1940        Visit Information        Provider Department      8/4/2017 1:00 PM Gonzales Ayoub MD [facility]        Today's Diagnoses     Benign essential hypertension    -  1    Myalgia        Hypothyroidism, unspecified type           Follow-ups after your visit        Who to contact     If you have questions or need follow up information about today's clinic visit or your schedule please contact [facility] directly at [phone].  Normal or non-critical lab and imaging results will be communicated to you by MyChart, letter or phone within 4 business days after the clinic has received the results. If you do not hear from us within 7 days, please contact the clinic through MyChart or phone. If you have a critical or abnormal lab result, we will notify you by phone as soon as possible.  Submit refill requests through [employer] or call your pharmacy and they will forward the refill request to us. Please allow 3 business days for your refill to be completed.          Additional Information About Your Visit        Care EveryWhere ID     This is your Care EveryWhere ID. This could be used by other organizations to access your [facility] medical records  [id number]        Your Vitals Were     Pulse Temperature Height Pulse Oximetry Breastfeeding? BMI (Body Mass Index)    101 97.6  F (36.4  C) (Oral) 5' 2.75\" (1.594 m) 98% No 28.57 kg/m2       Blood Pressure from Last 3 Encounters:   08/04/17 166/82   07/07/17 (!) 160/98   06/09/17 160/90    Weight from Last 3 Encounters:   08/04/17 160 lb (72.6 kg)   07/07/17 162 lb (73.5 kg)   06/09/17 161 lb (73 kg)              Today, you had the following     No orders found for display       Primary Care Provider Office Phone # Fax #    Gonzales Ayoub [phone]       [place] [facility] [address]  [facility] [address]        Equal Access to Services     DENISE JESSICA : Hadii aad ku hadvenusvadim Sobin, waaxda luidrisadaha, qayasmanita klarissayinavenkatesh sewell, anil oseiyahirhong hooper. So [facility] [phone].    ATENCIÓN: Si habla español, tiene a rodriguez disposición servicios gratuitos de asistencia lingüística. Llame al [phone].    We comply with applicable federal civil rights laws and [place] laws. We do not discriminate on the basis of race, color, national origin, age, disability sex, sexual orientation or gender identity.            Thank you!     Thank you for choosing [facility]  for your care. Our goal is always to provide you with excellent care. Hearing back from our patients is one way we can continue to improve our services. Please take a few minutes to complete the written survey that you may receive in the mail after your visit with us. Thank you!             Your Updated Medication List - Protect others around you: Learn how to safely use, store and throw away your medicines at www.disposemymeds.org.          This list is accurate as of: 8/4/17  1:12 PM.  Always use your most recent med list.                   Brand Name Dispense Instructions for use Diagnosis    losartan 50 MG tablet    COZAAR    90 tablet    Take 1 tablet (50 mg) by mouth daily    Benign essential hypertension       SYNTHROID PO      Take 50 mcg by mouth    Routine general medical examination at a health care facility       VITAMIN D (CHOLECALCIFEROL) PO      Take 1,500 Units by mouth daily    Routine general medical examination at a health care facility

## 2017-08-04 NOTE — NURSING NOTE
"Chief Complaint   Patient presents with     RECHECK       Initial Pulse 101  Temp 97.6  F (36.4  C) (Oral)  Ht 5' 2.75\" (1.594 m)  Wt 160 lb (72.6 kg)  SpO2 98%  Breastfeeding? No  BMI 28.57 kg/m2 Estimated body mass index is 28.57 kg/(m^2) as calculated from the following:    Height as of this encounter: 5' 2.75\" (1.594 m).    Weight as of this encounter: 160 lb (72.6 kg).  Medication Reconciliation: complete.  Delmi Ruiz CMA    "

## 2017-08-04 NOTE — PROGRESS NOTES
"Arline Link is a 77 year old female who presents for follow up hypertension, myalgias noted 4/17.  Since has mostly gone away and feels fine, no ha or dizziness, no chest pain or shortness of breath, no gi c/o.  Taking meds reg, blood pressure high today but at home mostly fine.  Up to date with endo.    Past Medical History:   Diagnosis Date     Benign essential hypertension 2016    started med by endocrine 11/16     Elevated ALT measurement 2015    fu nl 12/16     Hypercholesteremia      Hypothyroid 2012    Dr. Schmitt     Mucinosis of skin     Dr. Navas     Vitamin D deficiency      Past Surgical History:   Procedure Laterality Date     APPENDECTOMY  14     TONSILLECTOMY & ADENOIDECTOMY  5     Social History     Social History     Marital status:      Spouse name: N/A     Number of children: 4     Years of education: N/A     Occupational History     Not on file.     Social History Main Topics     Smoking status: Never Smoker     Smokeless tobacco: Never Used     Alcohol use No     Drug use: No     Sexual activity: Not Currently     Partners: Male     Other Topics Concern     Not on file     Social History Narrative     Current Outpatient Prescriptions   Medication Sig Dispense Refill     losartan (COZAAR) 50 MG tablet Take 1 tablet (50 mg) by mouth daily 90 tablet 3     Levothyroxine Sodium (SYNTHROID PO) Take 50 mcg by mouth       VITAMIN D, CHOLECALCIFEROL, PO Take 1,500 Units by mouth daily       Allergies   Allergen Reactions     Sulfa Drugs Unknown     Tetracycline Rash     FAMILY HISTORY NOTED AND REVIEWED    REVIEW OF SYSTEMS: above    PHYSICAL EXAM    /82  Pulse 101  Temp 97.6  F (36.4  C) (Oral)  Ht 5' 2.75\" (1.594 m)  Wt 160 lb (72.6 kg)  SpO2 98%  Breastfeeding? No  BMI 28.57 kg/m2    Patient appears non toxic  Lungs clear  cv reglar rate and rhythm  Abdomen non-tender  No edema    ASSESSMENT:  1. Hypertension, control has been fine, very anxious here, she will monitor " it  2. Myalgias, resolving  3. Hypothyroid, follow up endo and stable    PLAN:  Call if problems  Monitor blood pressure  Follow up December cpx.    Gonzales Ayoub M.D.        .

## 2017-09-26 ENCOUNTER — HOSPITAL ENCOUNTER (OUTPATIENT)
Dept: MAMMOGRAPHY | Facility: CLINIC | Age: 77
Discharge: HOME | End: 2017-09-26
Attending: INTERNAL MEDICINE | Admitting: INTERNAL MEDICINE
Payer: MEDICARE

## 2017-09-26 DIAGNOSIS — Z12.31 VISIT FOR SCREENING MAMMOGRAM: ICD-10-CM

## 2017-09-26 PROCEDURE — G0202 SCR MAMMO BI INCL CAD: HCPCS

## 2017-12-01 ENCOUNTER — TRANSFERRED RECORDS (OUTPATIENT)
Dept: HEALTH INFORMATION MANAGEMENT | Facility: CLINIC | Age: 77
End: 2017-12-01

## 2017-12-01 ENCOUNTER — TELEPHONE (OUTPATIENT)
Dept: FAMILY MEDICINE | Facility: CLINIC | Age: 77
End: 2017-12-01

## 2017-12-01 NOTE — TELEPHONE ENCOUNTER
Reason for Call:  Other prescription    Detailed comments:   Pt wanted to refill a script but says on bottle she needs appt with provider  Made an appt as soon as she could for 01/12  Will call her pharm to have them send over the request, but wanted to let us know she has appt    Phone Number Patient can be reached at: Cell number on file:    Telephone Information:   Mobile [phone]       Best Time: anytime    Can we leave a detailed message on this number? YES    Call taken on 12/1/2017 at 11:34 AM by Lydia Dolan

## 2018-01-12 ENCOUNTER — OFFICE VISIT (OUTPATIENT)
Dept: FAMILY MEDICINE | Facility: CLINIC | Age: 78
End: 2018-01-12
Payer: COMMERCIAL

## 2018-01-12 VITALS
HEIGHT: 63 IN | TEMPERATURE: 97.6 F | WEIGHT: 165.8 LBS | DIASTOLIC BLOOD PRESSURE: 88 MMHG | BODY MASS INDEX: 29.38 KG/M2 | SYSTOLIC BLOOD PRESSURE: 166 MMHG | OXYGEN SATURATION: 96 % | HEART RATE: 111 BPM

## 2018-01-12 DIAGNOSIS — E03.9 HYPOTHYROIDISM, UNSPECIFIED TYPE: ICD-10-CM

## 2018-01-12 DIAGNOSIS — R74.01 ELEVATED ALT MEASUREMENT: ICD-10-CM

## 2018-01-12 DIAGNOSIS — I10 BENIGN ESSENTIAL HYPERTENSION: ICD-10-CM

## 2018-01-12 DIAGNOSIS — E78.00 HYPERCHOLESTEREMIA: ICD-10-CM

## 2018-01-12 DIAGNOSIS — E55.9 VITAMIN D DEFICIENCY: ICD-10-CM

## 2018-01-12 DIAGNOSIS — Z00.00 ROUTINE GENERAL MEDICAL EXAMINATION AT A HEALTH CARE FACILITY: Primary | ICD-10-CM

## 2018-01-12 LAB
ERYTHROCYTE [DISTWIDTH] IN BLOOD BY AUTOMATED COUNT: 13.5 % (ref 10–15)
HCT VFR BLD AUTO: 43.6 % (ref 35–47)
HGB BLD-MCNC: 14.7 G/DL (ref 11.7–15.7)
MCH RBC QN AUTO: 30.8 PG (ref 26.5–33)
MCHC RBC AUTO-ENTMCNC: 33.7 G/DL (ref 31.5–36.5)
MCV RBC AUTO: 91 FL (ref 78–100)
PLATELET # BLD AUTO: 305 10E9/L (ref 150–450)
RBC # BLD AUTO: 4.77 10E12/L (ref 3.8–5.2)
WBC # BLD AUTO: 5.4 10E9/L (ref 4–11)

## 2018-01-12 PROCEDURE — G0438 PPPS, INITIAL VISIT: HCPCS | Performed by: INTERNAL MEDICINE

## 2018-01-12 PROCEDURE — 85027 COMPLETE CBC AUTOMATED: CPT | Performed by: INTERNAL MEDICINE

## 2018-01-12 PROCEDURE — 36415 COLL VENOUS BLD VENIPUNCTURE: CPT | Performed by: INTERNAL MEDICINE

## 2018-01-12 PROCEDURE — 80053 COMPREHEN METABOLIC PANEL: CPT | Performed by: INTERNAL MEDICINE

## 2018-01-12 PROCEDURE — 80061 LIPID PANEL: CPT | Performed by: INTERNAL MEDICINE

## 2018-01-12 PROCEDURE — 82306 VITAMIN D 25 HYDROXY: CPT | Performed by: INTERNAL MEDICINE

## 2018-01-12 RX ORDER — LOSARTAN POTASSIUM 50 MG/1
TABLET ORAL
Qty: 90 TABLET | Refills: 3 | Status: SHIPPED | OUTPATIENT
Start: 2018-01-12 | End: 2019-01-04

## 2018-01-12 NOTE — NURSING NOTE
"Chief Complaint   Patient presents with     Wellness Visit       Initial BP (!) 190/100 (BP Location: Left arm, Patient Position: Chair, Cuff Size: Adult Regular)  Pulse 111  Temp 97.6  F (36.4  C) (Oral)  Ht 5' 2.75\" (1.594 m)  Wt 165 lb 12.8 oz (75.2 kg)  SpO2 96%  Breastfeeding? No  BMI 29.6 kg/m2 Estimated body mass index is 29.6 kg/(m^2) as calculated from the following:    Height as of this encounter: 5' 2.75\" (1.594 m).    Weight as of this encounter: 165 lb 12.8 oz (75.2 kg).  Medication Reconciliation: complete.  Delmi Ruiz CMA    "

## 2018-01-12 NOTE — LETTER
[address] [facility]  [address]  [place]  [address]  Tel: [phone]    January 15, 2018    Arline DÍAZ Fariba  [address] [facility] [address]        Dear MsBenjamin Link,    The results of your recent labs are enclosed.    If you have any further questions or problems, please contact our office.      Sincerely,    Zakiya Caldwell, VENTURA/shoaib    Results for orders placed or performed in visit on 01/12/18   CBC with platelets   Result Value Ref Range    WBC 5.4 4.0 - 11.0 10e9/L    RBC Count 4.77 3.8 - 5.2 10e12/L    Hemoglobin 14.7 11.7 - 15.7 g/dL    Hematocrit 43.6 35.0 - 47.0 %    MCV 91 78 - 100 fl    MCH 30.8 26.5 - 33.0 pg    MCHC 33.7 31.5 - 36.5 g/dL    RDW 13.5 10.0 - 15.0 %    Platelet Count 305 150 - 450 10e9/L   Comprehensive metabolic panel   Result Value Ref Range    Sodium 137 133 - 144 mmol/L    Potassium 4.9 3.4 - 5.3 mmol/L    Chloride 105 94 - 109 mmol/L    Carbon Dioxide 22 20 - 32 mmol/L    Anion Gap 10 3 - 14 mmol/L    Glucose 91 70 - 99 mg/dL    Urea Nitrogen 19 7 - 30 mg/dL    Creatinine 0.76 0.52 - 1.04 mg/dL    GFR Estimate 74 >60 mL/min/1.7m2    GFR Estimate If Black 89 >60 mL/min/1.7m2    Calcium 9.6 8.5 - 10.1 mg/dL    Bilirubin Total 0.6 0.2 - 1.3 mg/dL    Albumin 4.2 3.4 - 5.0 g/dL    Protein Total 7.4 6.8 - 8.8 g/dL    Alkaline Phosphatase 98 40 - 150 U/L    ALT 32 0 - 50 U/L    AST 22 0 - 45 U/L   Lipid panel reflex to direct LDL Non-fasting   Result Value Ref Range    Cholesterol 217 (H) <200 mg/dL    Triglycerides 104 <150 mg/dL    HDL Cholesterol 66 >49 mg/dL    LDL Cholesterol Calculated 130 (H) <100 mg/dL    Non HDL Cholesterol 151 (H) <130 mg/dL   Vitamin D Deficiency   Result Value Ref Range    Vitamin D Deficiency screening 38 20 - 75 ug/L           Enclosure: Lab Results

## 2018-01-12 NOTE — LETTER
[facility]  [address] [place] [address] [address] [facility]  [address]  [place]  [address]  Tel: [phone]    January 15, 2018    Arline Link  [address] [facility] [address]        Dear Ms. Link,    I am happy to report that your cbc or complete blood count is normal with no signs of anemia, leukemia or platelet abnormalities. Your chemistry panel shows no signs of diabetes.  Your blood salts, kidney tests, liver tests, and proteins are all fine.    Your total cholesterol is 217 with the normal range being below 200.  Your HDL or good cholesterol is 66 with the normal range being above 50.  Your LDL or bad cholesterol is 130 with the normal range being below 130.  Overall these numbers are not bad but could be improved by regular exercise, diet and weight loss which I would recommend.    I am happy to bring you this overall excellent report.      If you have any further questions or problems, please contact our office.      Sincerely,    Gonzales Ayoub MD/ Nidia Cortes CMA  Results for orders placed or performed in visit on 01/12/18   CBC with platelets   Result Value Ref Range    WBC 5.4 4.0 - 11.0 10e9/L    RBC Count 4.77 3.8 - 5.2 10e12/L    Hemoglobin 14.7 11.7 - 15.7 g/dL    Hematocrit 43.6 35.0 - 47.0 %    MCV 91 78 - 100 fl    MCH 30.8 26.5 - 33.0 pg    MCHC 33.7 31.5 - 36.5 g/dL    RDW 13.5 10.0 - 15.0 %    Platelet Count 305 150 - 450 10e9/L   Comprehensive metabolic panel   Result Value Ref Range    Sodium 137 133 - 144 mmol/L    Potassium 4.9 3.4 - 5.3 mmol/L    Chloride 105 94 - 109 mmol/L    Carbon Dioxide 22 20 - 32 mmol/L    Anion Gap 10 3 - 14 mmol/L    Glucose 91 70 - 99 mg/dL    Urea Nitrogen 19 7 - 30 mg/dL    Creatinine 0.76 0.52 - 1.04 mg/dL    GFR Estimate 74 >60 mL/min/1.7m2    GFR Estimate If Black 89 >60 mL/min/1.7m2    Calcium 9.6 8.5 - 10.1 mg/dL    Bilirubin Total 0.6 0.2 - 1.3 mg/dL    Albumin 4.2 3.4 - 5.0 g/dL    Protein Total 7.4 6.8 - 8.8 g/dL    Alkaline Phosphatase 98 40 - 150  U/L    ALT 32 0 - 50 U/L    AST 22 0 - 45 U/L   Lipid panel reflex to direct LDL Non-fasting   Result Value Ref Range    Cholesterol 217 (H) <200 mg/dL    Triglycerides 104 <150 mg/dL    HDL Cholesterol 66 >49 mg/dL    LDL Cholesterol Calculated 130 (H) <100 mg/dL    Non HDL Cholesterol 151 (H) <130 mg/dL               Enclosure: Lab Results

## 2018-01-12 NOTE — PROGRESS NOTES
SUBJECTIVE:   Arline Link is a 77 year old female who presents for Preventive Visit.    The patient is here with her daughter Nat.  The patient is very nervous and somewhat hard to interrupt.  At times her daughter interjects.    Her blood pressure is quite high today.  However, she gets terrified when she comes to the doctor.  I have asked her to check it at home and she has yet to do that.  She is taking her medication regularly.  She is not having headaches, chest pain, or shortness of breath.  No edema.    The patient has a rash on her abdomen and under her breasts bilaterally.  She has seen  for this and plans to see him today for it.  She is using Lamisil but it does not seem to be helping.    She has regular endocrinology follow-up for her hypothyroidism was just very recently.  He did labs.  I do not have access to this.    She otherwise feels well.  Is not working out regularly.             Past Medical History:      Past Medical History:   Diagnosis Date     Benign essential hypertension 2016    started med by endocrine 11/16     Elevated ALT measurement 2015    fu nl 12/16     Hypercholesteremia      Hypothyroid 2012    Dr. Schmitt     Mucinosis of skin     Dr. Navas     Vitamin D deficiency              Past Surgical History:      Past Surgical History:   Procedure Laterality Date     APPENDECTOMY  14     TONSILLECTOMY & ADENOIDECTOMY  5             Social History:     Social History     Social History     Marital status:      Spouse name: N/A     Number of children: 4     Years of education: N/A     Occupational History     Not on file.     Social History Main Topics     Smoking status: Never Smoker     Smokeless tobacco: Never Used     Alcohol use No     Drug use: No     Sexual activity: Not Currently     Partners: Male     Other Topics Concern     Not on file     Social History Narrative             Family History:   reviewed         Allergies:     Allergies   Allergen  "Reactions     Sulfa Drugs Unknown     Tetracycline Rash             Medications:     Current Outpatient Prescriptions   Medication Sig Dispense Refill     losartan (COZAAR) 50 MG tablet TAKE 1 TABLET(50 MG) BY MOUTH DAILY 90 tablet 3     Levothyroxine Sodium (SYNTHROID PO) Take 50 mcg by mouth       VITAMIN D, CHOLECALCIFEROL, PO Take 1,500 Units by mouth daily       [DISCONTINUED] losartan (COZAAR) 50 MG tablet TAKE 1 TABLET(50 MG) BY MOUTH DAILY 90 tablet 0               Review of Systems:   The 10 point Review of Systems is negative other than noted in the HPI           Physical Exam:   Blood pressure 166/88, pulse 111, temperature 97.6  F (36.4  C), temperature source Oral, height 5' 2.75\" (1.594 m), weight 165 lb 12.8 oz (75.2 kg), SpO2 96 %, not currently breastfeeding.    Exam:  Constitutional: healthy appearing, alert and in no distress  Heent: Normocephalic. Head without obvious masses or lesions. PERRLDC, EOMI. Mouth exam within normal limits: tongue, mucous membranes, posterior pharynx all normal, no lesions or abnormalities seen.  Tm's and canals within normal limits bilaterally. Neck supple, no nuchal rigidity or masses. No supraclavicular, or cervical adenopathy. Thyroid symmetric, no masses.  Cardiovascular: Regular rate and rhythm, no murmer, rub or gallops.  JVP not elevated, no edema.  Carotids within normal limits bilaterally, no bruits.  Respiratory: Normal respiratory effort.  Lungs clear, normal flow, no wheezing or crackles.  Breasts: Normal bilaterally.  No masses or lesions.  Nipples within normal limits.  No axillary lesions or nodes.  My M.A. Was present during this part of the examination.  Gastrointestinal: Normal active bowel sounds.   Soft, not tender, no masses, guarding or rebound.  No hepatosplenomegaly.   Musculoskeletal: extremities normal, no gross deformities noted.  Skin: rash under breasts bilat, dry, has red rash mid abdomen, dry, no pus   Neurologic: Mental status within " normal limits.  Speech fluent.  No gross motor abnormalities and gait intact.  Psychiatric: mentation appears normal and affect normal.         Data:   Labs sent        Assessment:   1. Normal complete physical exam  2. Hypertension, ?control, has lots of white coat anxiety, to check it at home and call if up  3. Skin rash, to derm  4. Elevated alt, follow up labs  5. Elevated cholesterol, follow up labs  6. Hypothyroidism, follow up endo  7. hcm         Plan:   Refuses all vaccines  Exercise, diet and weight loss  Monitor blood pressure and if up call  Letter with labs      Gonzales Ayoub M.D.              Are you in the first 12 months of your Medicare Part B coverage?  No    Healthy Habits:    Do you get at least three servings of calcium containing foods daily (dairy, green leafy vegetables, etc.)? yes    Amount of exercise or daily activities, outside of work: walks     Problems taking medications regularly No    Medication side effects: No    Have you had an eye exam in the past two years? yes    Do you see a dentist twice per year? yes    Do you have sleep apnea, excessive snoring or daytime drowsiness?no      Ability to successfully perform activities of daily living: Yes, no assistance needed    Home safety:  none identified     Hearing impairment: No    Fall risk:                         Reviewed and updated as needed this visit by clinical staff         Reviewed and updated as needed this visit by Provider      Social History   Substance Use Topics     Smoking status: Never Smoker     Smokeless tobacco: Never Used     Alcohol use No       If you drink alcohol do you typically have >3 drinks per day or >7 drinks per week? No                        Today's PHQ-2 Score:   PHQ-2 ( 1999 [employer]) 7/7/2017 12/16/2016   Q1: Little interest or pleasure in doing things 0 0   Q2: Feeling down, depressed or hopeless 0 0   PHQ-2 Score 0 0         Do you feel safe in your environment - Yes    Do you have a Health Care  "Directive?: No: Advance care planning was reviewed with patient; patient declined at this time.      Current providers sharing in care for this patient include: Patient Care Team:  Gonzales Ayoub MD as PCP - General (Internal Medicine)    The following health maintenance items are reviewed in Epic and correct as of today:  Health Maintenance   Topic Date Due     TETANUS IMMUNIZATION (SYSTEM ASSIGNED)  05/29/1958     ADVANCE DIRECTIVE PLANNING Q5 YRS  05/29/1995     DEXA SCAN SCREENING (SYSTEM ASSIGNED)  05/29/2005     PNEUMOCOCCAL (1 of 2 - PCV13) 05/29/2005     INFLUENZA VACCINE (SYSTEM ASSIGNED)  09/01/2017     FALL RISK ASSESSMENT  07/07/2018     LIPID SCREEN Q5 YR FEMALE (SYSTEM ASSIGNED)  12/16/2021           End of Life Planning:  Patient currently has an advanced directive: No.  I have verified the patient's ablity to prepare an advanced directive/make health care decisions.      COUNSELING:  Reviewed preventive health counseling, as reflected in patient instructions       Regular exercise       Healthy diet/nutrition        Estimated body mass index is 28.57 kg/(m^2) as calculated from the following:    Height as of 8/4/17: 5' 2.75\" (1.594 m).    Weight as of 8/4/17: 160 lb (72.6 kg).       reports that she has never smoked. She has never used smokeless tobacco.        Appropriate preventive services were discussed with this patient, including applicable screening as appropriate for cardiovascular disease, diabetes, osteopenia/osteoporosis, and glaucoma.  As appropriate for age/gender, discussed screening for colorectal cancer, prostate cancer, breast cancer, and cervical cancer. Checklist reviewing preventive services available has been given to the patient.    Reviewed patients plan of care and provided an AVS. The Basic Care Plan (routine screening as documented in Health Maintenance) for Arline meets the Care Plan requirement. This Care Plan has been established and reviewed with the Patient and " daughter.    Counseling Resources:  ATP IV Guidelines  Pooled Cohorts Equation Calculator  Breast Cancer Risk Calculator  FRAX Risk Assessment  ICSI Preventive Guidelines  Dietary Guidelines for Americans, 2010  [employer] My[employer]ate  ASA Prophylaxis  Lung CA Screening    Gonzales Ayoub MD  [facility]

## 2018-01-12 NOTE — MR AVS SNAPSHOT
After Visit Summary   1/12/2018    Arline Link    MRN: [id number]           Patient Information     Date Of Birth          1940        Visit Information        Provider Department      1/12/2018 11:00 AM Gonzales Ayoub MD [facility]        Today's Diagnoses     Routine general medical examination at a health care facility    -  1    Benign essential hypertension        Vitamin D deficiency        Hypothyroidism, unspecified type        Hypercholesteremia        Elevated ALT measurement          Care Instructions      Preventive Health Recommendations    Female Ages 65 +    Yearly exam:     See your health care provider every year in order to  o Review health changes.   o Discuss preventive care.    o Review your medicines if your doctor has prescribed any.      You no longer need a yearly Pap test unless you've had an abnormal Pap test in the past 10 years. If you have vaginal symptoms, such as bleeding or discharge, be sure to talk with your provider about a Pap test.      Every 1 to 2 years, have a mammogram.  If you are over 69, talk with your health care provider about whether or not you want to continue having screening mammograms.      Every 10 years, have a colonoscopy. Or, have a yearly FIT test (stool test). These exams will check for colon cancer.       Have a cholesterol test every 5 years, or more often if your doctor advises it.       Have a diabetes test (fasting glucose) every three years. If you are at risk for diabetes, you should have this test more often.       At age 65, have a bone density scan (DEXA) to check for osteoporosis (brittle bone disease).    Shots:    Get a flu shot each year.    Get a tetanus shot every 10 years.    Talk to your doctor about your pneumonia vaccines. There are now two you should receive - Pneumovax (PPSV 23) and Prevnar (PCV 13).    Talk to your doctor about the shingles vaccine.    Talk to your doctor about the hepatitis B  "vaccine.    Nutrition:     Eat at least 5 servings of fruits and vegetables each day.      Eat whole-grain bread, whole-wheat pasta and brown rice instead of white grains and rice.      Talk to your provider about Calcium and Vitamin D.     Lifestyle    Exercise at least 150 minutes a week (30 minutes a day, 5 days a week). This will help you control your weight and prevent disease.      Limit alcohol to one drink per day.      No smoking.       Wear sunscreen to prevent skin cancer.       See your dentist twice a year for an exam and cleaning.      See your eye doctor every 1 to 2 years to screen for conditions such as glaucoma, macular degeneration and cataracts.          Follow-ups after your visit        Who to contact     If you have questions or need follow up information about today's clinic visit or your schedule please contact [facility] directly at [phone].  Normal or non-critical lab and imaging results will be communicated to you by RidePosthart, letter or phone within 4 business days after the clinic has received the results. If you do not hear from us within 7 days, please contact the clinic through RidePosthart or phone. If you have a critical or abnormal lab result, we will notify you by phone as soon as possible.  Submit refill requests through TextPower or call your pharmacy and they will forward the refill request to us. Please allow 3 business days for your refill to be completed.          Additional Information About Your Visit        TextPower Information     TextPower lets you send messages to your doctor, view your test results, renew your prescriptions, schedule appointments and more. To sign up, go to [email or web address] . Click on \"Log in\" on the left side of the screen, which will take you to the Welcome page. Then click on \"Sign up Now\" on the right side of the page.     You will be asked to enter the access code listed below, as well as some personal information. Please follow the " "directions to create your username and password.     Your access code is: [email or web address]  Expires: 2018 11:30 AM     Your access code will  in 90 days. If you need help or a new code, please call your [place] clinic or [phone].        Care EveryWhere ID     This is your Care EveryWhere ID. This could be used by other organizations to access your [place] medical records  [id number]        Your Vitals Were     Pulse Temperature Height Pulse Oximetry Breastfeeding? BMI (Body Mass Index)    111 97.6  F (36.4  C) (Oral) 5' 2.75\" (1.594 m) 96% No 29.6 kg/m2       Blood Pressure from Last 3 Encounters:   18 166/88   17 166/82   17 (!) 160/98    Weight from Last 3 Encounters:   18 165 lb 12.8 oz (75.2 kg)   17 160 lb (72.6 kg)   17 162 lb (73.5 kg)              We Performed the Following     CBC with platelets     Comprehensive metabolic panel     Lipid panel reflex to direct LDL Non-fasting     Vitamin D Deficiency          Today's Medication Changes          These changes are accurate as of: 18 11:30 AM.  If you have any questions, ask your nurse or doctor.               These medicines have changed or have updated prescriptions.        Dose/Directions    losartan 50 MG tablet   Commonly known as:  COZAAR   This may have changed:  See the new instructions.   Used for:  Benign essential hypertension, Routine general medical examination at a health care facility   Changed by:  Gonzales Ayoub MD        TAKE 1 TABLET(50 MG) BY MOUTH DAILY   Quantity:  90 tablet   Refills:  3            Where to get your medicines      These medications were sent to [facility] [id number] [place] [facility] [place] [address]     Phone:  [phone]     losartan 50 MG tablet                Primary Care Provider Office Phone # Fax #    Gonzales Ayoub [phone] [address]  [facility] [address]        Equal Access to Services     [facility] ARACELY : Hadii aad ku hadvenusvadim Sogurmeetali, waaxda luqadaha, qaybta kaalmaanil reynolds. So [facility] [phone].    ATENCIÓN: Si habla español, tiene a rodriguez disposición servicios gratuitos de asistencia lingüística. Llame al [phone].    We comply with applicable federal civil rights laws and [place] laws. We do not discriminate on the basis of race, color, national origin, age, disability, sex, sexual orientation, or gender identity.            Thank you!     Thank you for choosing [facility]  for your care. Our goal is always to provide you with excellent care. Hearing back from our patients is one way we can continue to improve our services. Please take a few minutes to complete the written survey that you may receive in the mail after your visit with us. Thank you!             Your Updated Medication List - Protect others around you: Learn how to safely use, store and throw away your medicines at www.disposemymeds.org.          This list is accurate as of: 1/12/18 11:30 AM.  Always use your most recent med list.                   Brand Name Dispense Instructions for use Diagnosis    losartan 50 MG tablet    COZAAR    90 tablet    TAKE 1 TABLET(50 MG) BY MOUTH DAILY    Benign essential hypertension, Routine general medical examination at a health care facility       SYNTHROID PO      Take 50 mcg by mouth    Routine general medical examination at a health care facility       VITAMIN D (CHOLECALCIFEROL) PO      Take 1,500 Units by mouth daily    Routine general medical examination at a health care facility

## 2018-01-13 LAB
ALBUMIN SERPL-MCNC: 4.2 G/DL (ref 3.4–5)
ALP SERPL-CCNC: 98 U/L (ref 40–150)
ALT SERPL W P-5'-P-CCNC: 32 U/L (ref 0–50)
ANION GAP SERPL CALCULATED.3IONS-SCNC: 10 MMOL/L (ref 3–14)
AST SERPL W P-5'-P-CCNC: 22 U/L (ref 0–45)
BILIRUB SERPL-MCNC: 0.6 MG/DL (ref 0.2–1.3)
BUN SERPL-MCNC: 19 MG/DL (ref 7–30)
CALCIUM SERPL-MCNC: 9.6 MG/DL (ref 8.5–10.1)
CHLORIDE SERPL-SCNC: 105 MMOL/L (ref 94–109)
CHOLEST SERPL-MCNC: 217 MG/DL
CO2 SERPL-SCNC: 22 MMOL/L (ref 20–32)
CREAT SERPL-MCNC: 0.76 MG/DL (ref 0.52–1.04)
GFR SERPL CREATININE-BSD FRML MDRD: 74 ML/MIN/1.7M2
GLUCOSE SERPL-MCNC: 91 MG/DL (ref 70–99)
HDLC SERPL-MCNC: 66 MG/DL
LDLC SERPL CALC-MCNC: 130 MG/DL
NONHDLC SERPL-MCNC: 151 MG/DL
POTASSIUM SERPL-SCNC: 4.9 MMOL/L (ref 3.4–5.3)
PROT SERPL-MCNC: 7.4 G/DL (ref 6.8–8.8)
SODIUM SERPL-SCNC: 137 MMOL/L (ref 133–144)
TRIGL SERPL-MCNC: 104 MG/DL

## 2018-01-13 NOTE — PROGRESS NOTES
It was a pleasure seeing you for your physical examination.  I wanted to get back to you with your test results.  I have enclosed a copy for your review.      I am happy to report that your cbc or complete blood count is normal with no signs of anemia, leukemia or platelet abnormalities. Your chemistry panel shows no signs of diabetes.  Your blood salts, kidney tests, liver tests, and proteins are all fine.    Your total cholesterol is 217 with the normal range being below 200.  Your HDL or good cholesterol is 66 with the normal range being above 50.  Your LDL or bad cholesterol is 130 with the normal range being below 130.  Overall these numbers are not bad but could be improved by regular exercise, diet and weight loss which I would recommend.    I am happy to bring you this overall excellent report.  If you have any questions please call me.

## 2018-01-15 LAB — DEPRECATED CALCIDIOL+CALCIFEROL SERPL-MC: 38 UG/L (ref 20–75)

## 2018-02-13 ENCOUNTER — TELEPHONE (OUTPATIENT)
Dept: FAMILY MEDICINE | Facility: CLINIC | Age: 78
End: 2018-02-13

## 2018-02-13 NOTE — TELEPHONE ENCOUNTER
Message noted.  I do not anticipate a change in the patient's blood pressure if a dose change in the thyroid medication.  Since her thyroid management is complicated, I recommend she have an evaluation appt with me if any further questions about the thyroid management.  Please relay message.

## 2018-02-13 NOTE — TELEPHONE ENCOUNTER
Please see patient message and advise as necessary    Jolene Orellana, RN  [employer]

## 2018-02-13 NOTE — TELEPHONE ENCOUNTER
Reason for Call:  Other call back Medication question    Detailed comments: The patient wants to know about lowering the dose of her Levothyroxine from 50 to 25MG  Want to know if the dose is lowered if her BP will go up higher   Dry itchy skin   Insomnia  Muscle aches and cramps in legs   For at least a year   Patient wants Dr Ayoub to discuss with Dr Schmitt    Phone Number Patient can be reached at: Home number on file [phone] (home)    Best Time: anytime    Can we leave a detailed message on this number? YES    Call taken on 2/13/2018 at 9:37 AM by Lashell Casey

## 2018-02-14 NOTE — TELEPHONE ENCOUNTER
Spoke with Pt she will continue to take the medication at her current dose, if no changes she will make a follow up appt with KELLEY BENSON.

## 2018-09-28 ENCOUNTER — HOSPITAL ENCOUNTER (OUTPATIENT)
Dept: MAMMOGRAPHY | Facility: CLINIC | Age: 78
Discharge: HOME | End: 2018-09-28
Attending: INTERNAL MEDICINE | Admitting: INTERNAL MEDICINE
Payer: MEDICARE

## 2018-09-28 DIAGNOSIS — Z12.31 VISIT FOR SCREENING MAMMOGRAM: ICD-10-CM

## 2018-09-28 PROCEDURE — 77067 SCR MAMMO BI INCL CAD: CPT

## 2018-10-18 ENCOUNTER — TRANSFERRED RECORDS (OUTPATIENT)
Dept: HEALTH INFORMATION MANAGEMENT | Facility: CLINIC | Age: 78
End: 2018-10-18

## 2019-01-04 ENCOUNTER — OFFICE VISIT (OUTPATIENT)
Dept: FAMILY MEDICINE | Facility: CLINIC | Age: 79
End: 2019-01-04
Payer: MEDICARE

## 2019-01-04 ENCOUNTER — OFFICE VISIT (OUTPATIENT)
Dept: ENDOCRINOLOGY | Facility: CLINIC | Age: 79
End: 2019-01-04
Payer: MEDICARE

## 2019-01-04 VITALS
SYSTOLIC BLOOD PRESSURE: 180 MMHG | DIASTOLIC BLOOD PRESSURE: 89 MMHG | TEMPERATURE: 97.5 F | HEART RATE: 108 BPM | OXYGEN SATURATION: 95 % | BODY MASS INDEX: 29.1 KG/M2 | WEIGHT: 163 LBS

## 2019-01-04 VITALS
HEIGHT: 63 IN | SYSTOLIC BLOOD PRESSURE: 160 MMHG | WEIGHT: 163 LBS | BODY MASS INDEX: 28.88 KG/M2 | DIASTOLIC BLOOD PRESSURE: 89 MMHG

## 2019-01-04 DIAGNOSIS — Z00.00 ROUTINE GENERAL MEDICAL EXAMINATION AT A HEALTH CARE FACILITY: Primary | ICD-10-CM

## 2019-01-04 DIAGNOSIS — E55.9 VITAMIN D DEFICIENCY: ICD-10-CM

## 2019-01-04 DIAGNOSIS — E78.00 HYPERCHOLESTEREMIA: ICD-10-CM

## 2019-01-04 DIAGNOSIS — M79.18 RIGHT BUTTOCK PAIN: ICD-10-CM

## 2019-01-04 DIAGNOSIS — E03.9 HYPOTHYROIDISM, UNSPECIFIED TYPE: ICD-10-CM

## 2019-01-04 DIAGNOSIS — E06.3 HYPOTHYROIDISM DUE TO HASHIMOTO'S THYROIDITIS: Primary | ICD-10-CM

## 2019-01-04 DIAGNOSIS — R74.01 ELEVATED ALT MEASUREMENT: ICD-10-CM

## 2019-01-04 DIAGNOSIS — F41.9 ANXIETY: ICD-10-CM

## 2019-01-04 DIAGNOSIS — I10 BENIGN ESSENTIAL HYPERTENSION: ICD-10-CM

## 2019-01-04 LAB
ERYTHROCYTE [DISTWIDTH] IN BLOOD BY AUTOMATED COUNT: 13.4 % (ref 10–15)
HCT VFR BLD AUTO: 44.5 % (ref 35–47)
HGB BLD-MCNC: 15 G/DL (ref 11.7–15.7)
MCH RBC QN AUTO: 31.9 PG (ref 26.5–33)
MCHC RBC AUTO-ENTMCNC: 33.7 G/DL (ref 31.5–36.5)
MCV RBC AUTO: 95 FL (ref 78–100)
PLATELET # BLD AUTO: 307 10E9/L (ref 150–450)
RBC # BLD AUTO: 4.7 10E12/L (ref 3.8–5.2)
WBC # BLD AUTO: 6.4 10E9/L (ref 4–11)

## 2019-01-04 PROCEDURE — 80061 LIPID PANEL: CPT | Performed by: INTERNAL MEDICINE

## 2019-01-04 PROCEDURE — 80053 COMPREHEN METABOLIC PANEL: CPT | Performed by: INTERNAL MEDICINE

## 2019-01-04 PROCEDURE — 99214 OFFICE O/P EST MOD 30 MIN: CPT | Performed by: INTERNAL MEDICINE

## 2019-01-04 PROCEDURE — 82306 VITAMIN D 25 HYDROXY: CPT | Performed by: INTERNAL MEDICINE

## 2019-01-04 PROCEDURE — 84443 ASSAY THYROID STIM HORMONE: CPT | Performed by: INTERNAL MEDICINE

## 2019-01-04 PROCEDURE — G0439 PPPS, SUBSEQ VISIT: HCPCS | Performed by: INTERNAL MEDICINE

## 2019-01-04 PROCEDURE — 85027 COMPLETE CBC AUTOMATED: CPT | Performed by: INTERNAL MEDICINE

## 2019-01-04 PROCEDURE — 36415 COLL VENOUS BLD VENIPUNCTURE: CPT | Performed by: INTERNAL MEDICINE

## 2019-01-04 RX ORDER — LOSARTAN POTASSIUM 50 MG/1
TABLET ORAL
Qty: 90 TABLET | Refills: 3 | Status: SHIPPED | OUTPATIENT
Start: 2019-01-04 | End: 2020-02-14

## 2019-01-04 ASSESSMENT — MIFFLIN-ST. JEOR: SCORE: 1184.52

## 2019-01-04 NOTE — PROGRESS NOTES
SUBJECTIVE:   Arline Link is a 78 year old female who presents for Preventive Visit.    This is an extremely anxious patient here with her extremely anxious daughter for a physical.    The patient overall feels fine.  She refuses all immunizations.  She is not checking her blood pressure regularly but is taking her medication.  She has follow-up with endocrine today for her thyroid.    She overall feels very well.  Her only complaint is right buttock pain where she fell on it a few years ago.  This is been going on for well over a year.  She is not having radiating pain or tingling or numbness or weakness.  She otherwise feels fine.  She is not working out.  She does not have a healthcare directive and does not want one.               Past Medical History:      Past Medical History:   Diagnosis Date     Benign essential hypertension 2016    started med by endocrine 11/16     Elevated ALT measurement 2015    fu nl 12/16     Hypercholesteremia      Hypothyroid 2012    Dr. Schmitt     Mucinosis of skin     Dr. Navas     Vitamin D deficiency              Past Surgical History:      Past Surgical History:   Procedure Laterality Date     APPENDECTOMY  14     TONSILLECTOMY & ADENOIDECTOMY  5             Social History:     Social History     Socioeconomic History     Marital status:      Spouse name: Not on file     Number of children: 4     Years of education: Not on file     Highest education level: Not on file   Social Needs     Financial resource strain: Not on file     Food insecurity - worry: Not on file     Food insecurity - inability: Not on file     Transportation needs - medical: Not on file     Transportation needs - non-medical: Not on file   Occupational History     Not on file   Tobacco Use     Smoking status: Never Smoker     Smokeless tobacco: Never Used   Substance and Sexual Activity     Alcohol use: No     Alcohol/week: 0.0 oz     Drug use: No     Sexual activity: Not Currently      Partners: Male   Other Topics Concern     Not on file   Social History Narrative     Not on file             Family History:   reviewed         Allergies:     Allergies   Allergen Reactions     Sulfa Drugs Unknown     Tetracycline Rash             Medications:     Current Outpatient Medications   Medication Sig Dispense Refill     Levothyroxine Sodium (SYNTHROID PO) Take 50 mcg by mouth       losartan (COZAAR) 50 MG tablet TAKE 1 TABLET(50 MG) BY MOUTH DAILY 90 tablet 3     VITAMIN D, CHOLECALCIFEROL, PO Take 1,500 Units by mouth daily                 Review of Systems:   The 10 point Review of Systems is negative other than noted in the HPI           Physical Exam:   Blood pressure 180/89, pulse 108, temperature 97.5  F (36.4  C), temperature source Oral, weight 73.9 kg (163 lb), SpO2 95 %, not currently breastfeeding.    Exam:  Constitutional: healthy appearing, alert and in no distress  Heent: Normocephalic. Head without obvious masses or lesions. PERRLDC, EOMI. Mouth exam within normal limits: tongue, mucous membranes, posterior pharynx all normal, no lesions or abnormalities seen.  Tm's and canals within normal limits bilaterally. Neck supple, no nuchal rigidity or masses. No supraclavicular, or cervical adenopathy. Thyroid symmetric, no masses.  Cardiovascular: Regular rate and rhythm, no murmer, rub or gallops.  JVP not elevated, no edema.  Carotids within normal limits bilaterally, no bruits.  Respiratory: Normal respiratory effort.  Lungs clear, normal flow, no wheezing or crackles.  Breasts: Normal bilaterally.  No masses or lesions.  Nipples within normal limits.  No axillary lesions or nodes.  My M.A. Was present during this part of the examination.  Gastrointestinal: Normal active bowel sounds.   Soft, not tender, no masses, guarding or rebound.  No hepatosplenomegaly.   Musculoskeletal: extremities normal, no gross deformities noted.  Skin: no suspicious lesions or rashes   Neurologic: Mental status  "within normal limits.  Speech fluent.  No gross motor abnormalities and gait intact.  Psychiatric: mentation appears normal and affect normal.         Data:   Labs sent        Assessment:   1. Normal complete physical exam  2. Anxiety  3. Hypertension, ?control, up now but very anxious  4. Right buttock pain, suspect msk, doubt vascular, clot, tumor, mets  5. Hypothyroidism, follow up endo  6. Elevated alt, follow up today  7. Vit d defic, follow up labs  8. Elevated cholesterol, follow up labs  9. hcm         Plan:   Refuses immunizations  Letter with labs  Discussed with patient need to check blood pressure and call if up  Exercise, diet  Pain mgmt bhavesh Ayoub M.D.            Are you in the first 12 months of your Medicare Part B coverage?  No    Physical Health:    In general, how would you rate your overall physical health? excellent    Outside of work, how many days during the week do you exercise? none    Outside of work, approximately how many minutes a day do you exercise?not applicable    If you drink alcohol do you typically have >3 drinks per day or >7 drinks per week? No    Do you usually eat at least 4 servings of fruit and vegetables a day, include whole grains & fiber and avoid regularly eating high fat or \"junk\" foods? Yes    Do you have any problems taking medications regularly?  No    Do you have any side effects from medications? none    Needs assistance for the following daily activities: no assistance needed    Which of the following safety concerns are present in your home?  none identified     Hearing impairment: No    In the past 6 months, have you been bothered by leaking of urine? no    Mental Health:    In general, how would you rate your overall mental or emotional health? good  PHQ-2 Score:      Do you feel safe in your environment? Yes    Do you have a Health Care Directive? No: Advance care planning reviewed with patient; information given to patient to " review.    Additional concerns to address?      Fall risk:  Fallen 2 or more times in the past year?: No  Any fall with injury in the past year?: No    Cognitive Screenin) Repeat 3 items (Leader, Season, Table)    2) Clock draw: NORMAL  3) 3 item recall: Recalls 3 objects  Results: 3 items recalled: COGNITIVE IMPAIRMENT LESS LIKELY    Mini-CogTM Copyright BON Pichardo. Licensed by the author for use in [facility]; reprinted with permission (soob@[facility]). All rights reserved.      Do you have sleep apnea, excessive snoring or daytime drowsiness?: no            Reviewed and updated as needed this visit by clinical staff         Reviewed and updated as needed this visit by Provider        Social History     Tobacco Use     Smoking status: Never Smoker     Smokeless tobacco: Never Used   Substance Use Topics     Alcohol use: No     Alcohol/week: 0.0 oz                           Current providers sharing in care for this patient include:   Patient Care Team:  Gonzales Ayoub MD as PCP - General (Internal Medicine)  Gonzales Ayoub MD as PCP - Assigned PCP    The following health maintenance items are reviewed in Epic and correct as of today:  Health Maintenance   Topic Date Due     DTAP/TDAP/TD IMMUNIZATION (1 - Tdap) 1965     ZOSTER IMMUNIZATION (1 of 2) 1990     ADVANCE DIRECTIVE PLANNING Q5 YRS  1995     DEXA SCAN SCREENING (SYSTEM ASSIGNED)  2005     PNEUMOVAX IMMUNIZATION 65+ LOW/MEDIUM RISK (1 of 2 - PCV13) 2005     INFLUENZA VACCINE (1) 2018     FALL RISK ASSESSMENT  2019     PHQ-2 Q1 YR  2019     LIPID SCREEN Q5 YR FEMALE (SYSTEM ASSIGNED)  2023     IPV IMMUNIZATION  Aged Out     MENINGITIS IMMUNIZATION  Aged Out     End of Life Planning:  Patient currently has an advanced directive: none as above    COUNSELING:  Reviewed preventive health counseling, as reflected in patient instructions       Regular exercise       Healthy  "diet/nutrition    BP Readings from Last 1 Encounters:   01/12/18 166/88     Estimated body mass index is 29.6 kg/m  as calculated from the following:    Height as of 1/12/18: 1.594 m (5' 2.75\").    Weight as of 1/12/18: 75.2 kg (165 lb 12.8 oz).           reports that  has never smoked. she has never used smokeless tobacco.      Appropriate preventive services were discussed with this patient, including applicable screening as appropriate for cardiovascular disease, diabetes, osteopenia/osteoporosis, and glaucoma.  As appropriate for age/gender, discussed screening for colorectal cancer, prostate cancer, breast cancer, and cervical cancer. Checklist reviewing preventive services available has been given to the patient.    Reviewed patients plan of care and provided an AVS. The Basic Care Plan (routine screening as documented in Health Maintenance) for Arline meets the Care Plan requirement. This Care Plan has been established and reviewed with the Patient and daughter.    Counseling Resources:  ATP IV Guidelines  Pooled Cohorts Equation Calculator  Breast Cancer Risk Calculator  FRAX Risk Assessment  ICSI Preventive Guidelines  Dietary Guidelines for Americans, 2010  USDA's MyPlate  ASA Prophylaxis  Lung CA Screening    Gonzales Ayoub MD  [facility]  "

## 2019-01-04 NOTE — PROGRESS NOTES
Name: Arline Link is a 78 year old woman, self-referred for evaluation of thyroid disease.  Previously seen by me at my former clinic ([facility]), here to establish care with [facility].    Chief Complaint   Patient presents with     Thyroid Problem     hypothyroidism       HPI:  Recent issues:  Here for evaluation of hypothyroidism, with her daughter Vanna today  Reviewed medical history from patient and Epic chart record        History of mild hypothyroidism  Labs from [facility] and [facility] 2009 and 2012 showed TSH 6.8- 7.0 range  10/23/12 labs:  TSH 6.27, FT4 0.83, TPOAb 50  Previous symptoms with anxiety, cold intolerance, dry skin, occasional constipation  Had seen me for thyroid evaluations at my former clinic ([facility])  Decision made to try low-dose thyroid medication  Chronic treatment with Synthroid medication     Previous [facility] thyroid labs include:  Recent Labs   Lab Test 04/21/17 04/14/17  1124 12/16/16  1111   TSH 1.88 1.43 2.22     Recent [facility] labs include:  Lab Results   Component Value Date    TSH 1.88 04/21/2017   Fam Hx thyroid disease:   Thyroid cancer- brother   Hypothyroidism- daughters Ashleigh Sheikh, Nat Khoury, Dominga Wilson; her granddaughter Frank Baez  Current dose:  Synthroid 0.05 mg daily      Had previously seen Dr. Bam Welsh/[facility]  Sees Dr. Gonzales Ayoub/[facility] clinic for general medicine evaluations.  Also sees Lexi Paige/GYN, Elena/Derm    PMH/PSH:  Past Medical History:   Diagnosis Date     Benign essential hypertension 2016    started med by endocrine 11/16     Elevated ALT measurement 2015    fu nl 12/16     Hypercholesteremia      Hypothyroid 2012    Dr. Schmitt     Mucinosis of skin     Dr. Navas     Vitamin D deficiency      Past Surgical History:   Procedure Laterality Date     APPENDECTOMY  14     TONSILLECTOMY & ADENOIDECTOMY  5       Family Hx:  Family History   Problem Relation  "Age of Onset     Breast Cancer No family hx of          Social Hx:  Social History     Socioeconomic History     Marital status:      Spouse name: Not on file     Number of children: 4     Years of education: Not on file     Highest education level: Not on file   Social Needs     Financial resource strain: Not on file     Food insecurity - worry: Not on file     Food insecurity - inability: Not on file     Transportation needs - medical: Not on file     Transportation needs - non-medical: Not on file   Occupational History     Not on file   Tobacco Use     Smoking status: Never Smoker     Smokeless tobacco: Never Used   Substance and Sexual Activity     Alcohol use: No     Alcohol/week: 0.0 oz     Drug use: No     Sexual activity: Not Currently     Partners: Male   Other Topics Concern     Not on file   Social History Narrative     Not on file          MEDICATIONS:  has a current medication list which includes the following prescription(s): levothyroxine sodium, losartan, and cholecalciferol.    ROS:     ROS: 10 point ROS neg other than the symptoms noted above in the HPI.    GENERAL: no fatigue, wt stable; denies fevers, chills, night sweats.    HEENT: no dysphagia, odonophagia, diplopia, neck pain  THYROID:  no apparent hyper or hypothyroid symptoms  CV: no chest pain, pressure, palpitations  LUNGS: no SOB, MINER, cough, wheezing   ABDOMEN: no diarrhea, constipation, abdominal pain  EXTREMITIES: no rashes, ulcers, edema  NEUROLOGY: no headaches, denies changes in vision, tingling, extremitiy numbness   MSK: no muscle aches or pains, weakness  SKIN: no rashes or lesions  : no menses, denies hot flashes  PSYCH:  anxiety  ENDOCRINE: no heat or cold intolerance    Physical Exam   VS: /89   Ht 1.594 m (5' 2.75\")   Wt 73.9 kg (163 lb)   BMI 29.10 kg/m    GENERAL: AXOX3, NAD, well dressed, answering questions appropriately, appears stated age.  THYROID:  normal gland, no apparent nodules or " goiter  HEENT: neck non-tender, no exopthalmous, no proptosis, EOMI  CV: RRR, no rubs, gallops, no murmurs  LUNGS: CTAB, no wheezes, rales, or ronchi  ABDOMEN: mildly obese, soft, nontender, nondistended  EXTREMITIES: no edema, no lesions  NEUROLOGY: CN grossly intact, no tremors  MSK: grossly intact  SKIN: no rashes, no lesions    LABS:    All pertinent notes, labs, and images personally reviewed by me.     A/P:  Encounter Diagnoses   Name Primary?     Hypothyroidism due to Hashimoto's thyroiditis Yes     Anxiety        Comments:  Reviewed health history and hypothyroidism issues.  Difficult to assess for thyroid symptoms with her underlying anxiety disorder    Plan:  Discussed general issues with the hypothyroidism diagnosis and management  Discussed lab tests used to assess patient thyroid hormone levels  Reviewed treatment options including levothyroxine medication, ideal dosing    Recommend:  Continue current Synthroid 0.05 mg daily dose  She prefers Synthroid brand  Discussed potential symptoms of high vs low thyroid hormone levels  She had recent TSH Reflex ordered with PCP appt today   Will review thyroid lab test, when available  Monitor for symptom changes    Patient to follow up with Primary Care provider regarding elevated blood pressure.  Addressed patient questions today    Labs ordered today: No orders of the defined types were placed in this encounter.    Radiology/Consults ordered today: None    More than 50% of the time spent with Ms. Link on counseling / coordinating her care.  Total appointment time was 30 minutes.    Follow-up:  1 yr and prn    Bala Schmitt MD  Endocrinology  [facility]  CC: Gonzales Ayoub

## 2019-01-04 NOTE — LETTER
[facility]  [address] [facility]  [address]  [place]  [address]  Tel: [phone]    January 5, 2019    Arline Link  [address] [facility] [id number]        Dear Ms. Link,    It was a pleasure seeing you for your physical examination.  I wanted to get back to you with your test results.  I have enclosed a copy for your review.     I am happy to report that your cbc or complete blood count is normal with no signs of anemia, leukemia or platelet abnormalities. Your chemistry panel shows no signs of diabetes.  Your blood salts, kidney tests, liver tests, thyroid test, and proteins are all fine.    Your total cholesterol is too high at 250 with the normal range being below 200.  Your HDL or good cholesterol is 65 with the normal range being above 50.  Your LDL or bad cholesterol is 155 with the normal range being below 130.  These numbers do raise your risk for a heart attack or stroke and I would suggest taking something like lipitor or crestor to lower them.  The medicines are very good and safe.  Please let me know if that would be ok to send to your pharmacy.    I am happy to bring you this overall excellent report.  Please monitor your blood pressure and if it is not around 130/80 let me know. If you have any questions please call me.  Sincerely,    Gonzales Ayoub MD/RC          Enclosure: Lab Results  Results for orders placed or performed in visit on 01/04/19   CBC with platelets   Result Value Ref Range    WBC 6.4 4.0 - 11.0 10e9/L    RBC Count 4.70 3.8 - 5.2 10e12/L    Hemoglobin 15.0 11.7 - 15.7 g/dL    Hematocrit 44.5 35.0 - 47.0 %    MCV 95 78 - 100 fl    MCH 31.9 26.5 - 33.0 pg    MCHC 33.7 31.5 - 36.5 g/dL    RDW 13.4 10.0 - 15.0 %    Platelet Count 307 150 - 450 10e9/L   Comprehensive metabolic panel   Result Value Ref Range    Sodium 136 133 - 144 mmol/L    Potassium 4.3 3.4 - 5.3 mmol/L    Chloride 102 94 - 109 mmol/L    Carbon Dioxide 22 20 - 32 mmol/L    Anion Gap 12 3 - 14  mmol/L    Glucose 94 70 - 99 mg/dL    Urea Nitrogen 19 7 - 30 mg/dL    Creatinine 0.89 0.52 - 1.04 mg/dL    GFR Estimate 62 >60 mL/min/[1.73_m2]    GFR Estimate If Black 72 >60 mL/min/[1.73_m2]    Calcium 9.9 8.5 - 10.1 mg/dL    Bilirubin Total 0.7 0.2 - 1.3 mg/dL    Albumin 4.3 3.4 - 5.0 g/dL    Protein Total 7.6 6.8 - 8.8 g/dL    Alkaline Phosphatase 88 40 - 150 U/L    ALT 35 0 - 50 U/L    AST 25 0 - 45 U/L   Lipid panel reflex to direct LDL Non-fasting   Result Value Ref Range    Cholesterol 250 (H) <200 mg/dL    Triglycerides 151 (H) <150 mg/dL    HDL Cholesterol 65 >49 mg/dL    LDL Cholesterol Calculated 155 (H) <100 mg/dL    Non HDL Cholesterol 185 (H) <130 mg/dL   TSH with free T4 reflex   Result Value Ref Range    TSH 1.96 0.40 - 4.00 mU/L

## 2019-01-04 NOTE — PATIENT INSTRUCTIONS
Preventive Health Recommendations    See your health care provider every year to    Review health changes.     Discuss preventive care.      Review your medicines if your doctor has prescribed any.      You no longer need a yearly Pap test unless you've had an abnormal Pap test in the past 10 years. If you have vaginal symptoms, such as bleeding or discharge, be sure to talk with your provider about a Pap test.      Every 1 to 2 years, have a mammogram.  If you are over 69, talk with your health care provider about whether or not you want to continue having screening mammograms.      Every 10 years, have a colonoscopy. Or, have a yearly FIT test (stool test). These exams will check for colon cancer.       Have a cholesterol test every 5 years, or more often if your doctor advises it.       Have a diabetes test (fasting glucose) every three years. If you are at risk for diabetes, you should have this test more often.       At age 65, have a bone density scan (DEXA) to check for osteoporosis (brittle bone disease).    Shots:    Get a flu shot each year.    Get a tetanus shot every 10 years.    Talk to your doctor about your pneumonia vaccines. There are now two you should receive - Pneumovax (PPSV 23) and Prevnar (PCV 13).    Talk to your pharmacist about the shingles vaccine.    Talk to your doctor about the hepatitis B vaccine.    Nutrition:     Eat at least 5 servings of fruits and vegetables each day.      Eat whole-grain bread, whole-wheat pasta and brown rice instead of white grains and rice.      Get adequate Calcium and Vitamin D.     Lifestyle    Exercise at least 150 minutes a week (30 minutes a day, 5 days a week). This will help you control your weight and prevent disease.      Limit alcohol to one drink per day.      No smoking.       Wear sunscreen to prevent skin cancer.       See your dentist twice a year for an exam and cleaning.      See your eye doctor every 1 to 2 years to screen for conditions  such as glaucoma, macular degeneration and cataracts.    Personalized Prevention Plan  You are due for the preventive services outlined below.  Your care team is available to assist you in scheduling these services.  If you have already completed any of these items, please share that information with your care team to update in your medical record.  Health Maintenance Due   Topic Date Due     Diptheria Tetanus Pertussis (DTAP/TDAP/TD) Vaccine (1 - Tdap) 05/29/1965     Zoster (Chicken Pox) Vaccine (1 of 2) 05/29/1990     Discuss Advance Directive Planning  05/29/1995     Bone Density Screening (Dexa)  05/29/2005     Pneumococcal Vaccine (1 of 2 - PCV13) 05/29/2005     Flu Vaccine (1) 09/01/2018     FALL RISK ASSESSMENT  01/12/2019     Depression Assessment 2 - yearly  01/12/2019

## 2019-01-05 LAB
ALBUMIN SERPL-MCNC: 4.3 G/DL (ref 3.4–5)
ALP SERPL-CCNC: 88 U/L (ref 40–150)
ALT SERPL W P-5'-P-CCNC: 35 U/L (ref 0–50)
ANION GAP SERPL CALCULATED.3IONS-SCNC: 12 MMOL/L (ref 3–14)
AST SERPL W P-5'-P-CCNC: 25 U/L (ref 0–45)
BILIRUB SERPL-MCNC: 0.7 MG/DL (ref 0.2–1.3)
BUN SERPL-MCNC: 19 MG/DL (ref 7–30)
CALCIUM SERPL-MCNC: 9.9 MG/DL (ref 8.5–10.1)
CHLORIDE SERPL-SCNC: 102 MMOL/L (ref 94–109)
CHOLEST SERPL-MCNC: 250 MG/DL
CO2 SERPL-SCNC: 22 MMOL/L (ref 20–32)
CREAT SERPL-MCNC: 0.89 MG/DL (ref 0.52–1.04)
GFR SERPL CREATININE-BSD FRML MDRD: 62 ML/MIN/{1.73_M2}
GLUCOSE SERPL-MCNC: 94 MG/DL (ref 70–99)
HDLC SERPL-MCNC: 65 MG/DL
LDLC SERPL CALC-MCNC: 155 MG/DL
NONHDLC SERPL-MCNC: 185 MG/DL
POTASSIUM SERPL-SCNC: 4.3 MMOL/L (ref 3.4–5.3)
PROT SERPL-MCNC: 7.6 G/DL (ref 6.8–8.8)
SODIUM SERPL-SCNC: 136 MMOL/L (ref 133–144)
TRIGL SERPL-MCNC: 151 MG/DL
TSH SERPL DL<=0.005 MIU/L-ACNC: 1.96 MU/L (ref 0.4–4)

## 2019-01-06 NOTE — RESULT ENCOUNTER NOTE
It was a pleasure seeing you for your physical examination.  I wanted to get back to you with your test results.  I have enclosed a copy for your review.     I am happy to report that your cbc or complete blood count is normal with no signs of anemia, leukemia or platelet abnormalities. Your chemistry panel shows no signs of diabetes.  Your blood salts, kidney tests, liver tests, thyroid test, and proteins are all fine.    Your total cholesterol is too high at 250 with the normal range being below 200.  Your HDL or good cholesterol is 65 with the normal range being above 50.  Your LDL or bad cholesterol is 155 with the normal range being below 130.  These numbers do raise your risk for a heart attack or stroke and I would suggest taking something like lipitor or crestor to lower them.  The medicines are very good and safe.  Please let me know if that would be ok to send to your pharmacy.    I am happy to bring you this overall excellent report.  Please monitor your blood pressure and if it is not around 130/80 let me know. If you have any questions please call me.

## 2019-01-07 LAB — DEPRECATED CALCIDIOL+CALCIFEROL SERPL-MC: 44 UG/L (ref 20–75)

## 2019-01-10 ENCOUNTER — TELEPHONE (OUTPATIENT)
Dept: FAMILY MEDICINE | Facility: CLINIC | Age: 79
End: 2019-01-10

## 2019-01-10 DIAGNOSIS — Z00.00 ROUTINE GENERAL MEDICAL EXAMINATION AT A HEALTH CARE FACILITY: ICD-10-CM

## 2019-01-10 RX ORDER — MULTIVIT-MIN/IRON/FOLIC ACID/K 18-600-40
2000 CAPSULE ORAL DAILY
COMMUNITY
Start: 2019-01-10

## 2019-01-10 NOTE — TELEPHONE ENCOUNTER
"Pt called about lab results:    States does not trust statins, and is only worried about triglycerides.  Since those are only at 151, does not want to start a statin.  Did educate to pt that LDL is also important, good to reduce, etc, and pt states \"I know, I've researched and I'm not starting a statin.\" She is planning on reducing sweets intake and eating overall healthier diet, try to lose weight and would like to recheck flp in 3-6 months.    Pt also requesting if you have any openings to take on her daughter, Nat as a pt.  No known concerns at this time, just needs a physical per pt.  Roxann Schofield RN    "

## 2019-01-10 NOTE — TELEPHONE ENCOUNTER
Relayed message to pt  Pt also updated her Vit D dose. She takes 2,000 units daily- updated med list    Memo MOLINA RN

## 2019-01-10 NOTE — TELEPHONE ENCOUNTER
Reason for Call:  Other Result Question    Detailed comments: The patient received her lab results and she has questions       Phone Number Patient can be reached at: Home number on file [phone] (home)    Best Time: Transferred to RN    Can we leave a detailed message on this number? YES    Call taken on 1/10/2019 at 11:14 AM by Lashell Casey

## 2019-02-05 DIAGNOSIS — E03.9 HYPOTHYROIDISM, UNSPECIFIED TYPE: Primary | ICD-10-CM

## 2019-02-05 DIAGNOSIS — Z00.00 ROUTINE GENERAL MEDICAL EXAMINATION AT A HEALTH CARE FACILITY: ICD-10-CM

## 2019-02-05 RX ORDER — LEVOTHYROXINE SODIUM 50 UG/1
50 TABLET ORAL DAILY
Qty: 90 TABLET | Refills: 3 | Status: SHIPPED | OUTPATIENT
Start: 2019-02-05 | End: 2020-01-24

## 2019-02-05 NOTE — TELEPHONE ENCOUNTER
Request for Synthroid 50 mcg (90 day refill)- Medication entered historically in Epic.    Please fill if appropriate.    Thanks!    Sue Momin MA

## 2019-02-06 NOTE — TELEPHONE ENCOUNTER
Message noted, updated Synthroid Rx sent to pharmacy as requested.    CATHY Schmitt MD  Endocrinology   [facility]

## 2019-04-24 ENCOUNTER — TELEPHONE (OUTPATIENT)
Dept: FAMILY MEDICINE | Facility: CLINIC | Age: 79
End: 2019-04-24

## 2019-04-24 NOTE — TELEPHONE ENCOUNTER
Arline says that she does not want to see pain management, she prefers to see [facility] b/c she prefers to see a woman.  Please advise or place referral.   Berkley Navas MA

## 2019-04-24 NOTE — TELEPHONE ENCOUNTER
Reason for Call:  Other referral    Detailed comments: The patient says she only wants to see a female provider and that she wants to go to [facility] on [address]    Phone Number Patient can be reached at: Home number on file [phone] (home)    Best Time: when referral has been placed     Can we leave a detailed message on this number? YES    Call taken on 4/24/2019 at 11:45 AM by Lashell Casey

## 2019-04-24 NOTE — TELEPHONE ENCOUNTER
Pain comes and goes to Buttocks and front upper leg  Referred to pain clinic in January for the same thing but she prefers the [facility] clinic     Please place referral, if appropriate    Thank you,  Memo MOLINA RN   No adenopathy or splenomegaly. No cervical or inguinal lymphadenopathy.

## 2019-04-24 NOTE — TELEPHONE ENCOUNTER
Left a message for fani to call back. She hasn't been seen since January and just wondering if Dr. Ayoub will know why she wants the referral or if she should come into the clinic first and see him.   Berkley Navas MA

## 2019-04-25 NOTE — TELEPHONE ENCOUNTER
Pt will call the Pain Clinic. She may be able to see a female Provider at another location?     She will call to schedule an appt.       Donya ROMO RN

## 2019-04-25 NOTE — TELEPHONE ENCOUNTER
I really doubt that clinic will help as they deal with arthritis in general and not localized issues.  I would see one of the pain doctors here for bhavesh Ayoub M.D.

## 2019-05-20 ENCOUNTER — OFFICE VISIT (OUTPATIENT)
Dept: PALLIATIVE MEDICINE | Facility: CLINIC | Age: 79
End: 2019-05-20
Payer: MEDICARE

## 2019-05-20 VITALS
OXYGEN SATURATION: 97 % | HEART RATE: 102 BPM | BODY MASS INDEX: 29.1 KG/M2 | DIASTOLIC BLOOD PRESSURE: 89 MMHG | WEIGHT: 163 LBS | SYSTOLIC BLOOD PRESSURE: 180 MMHG

## 2019-05-20 DIAGNOSIS — G57.01 PIRIFORMIS SYNDROME, RIGHT: Primary | ICD-10-CM

## 2019-05-20 DIAGNOSIS — M79.18 MYOFASCIAL MUSCLE PAIN: ICD-10-CM

## 2019-05-20 PROCEDURE — 99205 OFFICE O/P NEW HI 60 MIN: CPT | Performed by: PAIN MEDICINE

## 2019-05-20 RX ORDER — ACETAMINOPHEN 325 MG/1
325-650 TABLET ORAL EVERY 6 HOURS PRN
COMMUNITY

## 2019-05-20 RX ORDER — TIZANIDINE 2 MG/1
1-2 TABLET ORAL 2 TIMES DAILY PRN
Qty: 30 TABLET | Refills: 0 | Status: SHIPPED | OUTPATIENT
Start: 2019-05-20 | End: 2020-02-14

## 2019-05-20 ASSESSMENT — PAIN SCALES - GENERAL: PAINLEVEL: EXTREME PAIN (9)

## 2019-05-20 NOTE — Clinical Note
Thank you for the referral.  Patient is extremely hesitant to trying anything.  Will start with low-dose Zanaflex and PT

## 2019-05-20 NOTE — PROGRESS NOTES
[facility] Consultation    Date of visit: 5/20/2019    Reason for consultation:    Primary Care Provider is Gonzales Ayoub.  Pain medications are being prescribed by pcp.    Please see the [facility] health questionnaire which the patient completed and reviewed with me in detail.    Chief Complaint:    Chief Complaint   Patient presents with     Pain     MME prescribed prior to seeing patient:  Current MME:    Pain history: At times extremely difficult to get a history.  Patient is tangential at times.  Patient's daughter is present during the appointment and assistance with history.  Arline Link is a 78 year old female who first started having problems with pain apprxo 20 yrs ago.  The patient reports having a fall.  At that time she had a large lump/bruise.  The pain gradually improved over time.  She did not have any specific interventions at that time.  The pain has gradually gotten worse over the last year.  The pain is mostly located over her right lateral buttocks.  The pain is a deep aching pain.  The pain occasionally radiate out to her lateral leg/hip.  She denies any numbness burning tingling.  She denies any radiation to her lower extremity.  The pain is worse with prolonged sitting.  The pain is worse when going from a seated to standing position.  The pain is worse with prolonged walking.  The pain is slightly better with rest.  She denies any overt weakness.  She denies any incontinence.  Overall the patient's biggest concern is maintaining her current mobility.  Of note patient is very nervous about her procedures.      Pain rating: intensity  Averages 4/10 on a 0-10 scale.      Current treatments include:  Topical creams  Acetaminophen as needed  Previous medication treatments included:  Not sure    Other treatments have included:  Arline Link has not been seen at a pain clinic in the past.    PT: no  Chiropractic: no  Acupuncture: no  TENs  Unit: no  Injections: no    Past Medical History:  Past Medical History:   Diagnosis Date     Benign essential hypertension 2016    started med by endocrine 11/16     Elevated ALT measurement 2015    fu nl 12/16     Hypercholesteremia      Hypothyroid 2012    Dr. Schimtt     Mucinosis of skin     Dr. Navas     Vitamin D deficiency      Past Surgical History:  Past Surgical History:   Procedure Laterality Date     APPENDECTOMY  14     TONSILLECTOMY & ADENOIDECTOMY  5     Medications:  Current Outpatient Medications   Medication Sig Dispense Refill     acetaminophen (TYLENOL) 325 MG tablet Take 325-650 mg by mouth every 6 hours as needed for mild pain       levothyroxine (SYNTHROID) 50 MCG tablet Take 1 tablet (50 mcg) by mouth daily Synthroid DAW1 90 tablet 3     losartan (COZAAR) 50 MG tablet TAKE 1 TABLET(50 MG) BY MOUTH DAILY 90 tablet 3     tiZANidine (ZANAFLEX) 2 MG tablet Take 0.5-1 tablets (1-2 mg) by mouth 2 times daily as needed for muscle spasms 30 tablet 0     Vitamin D, Cholecalciferol, 1000 units TABS Take 2,000 Units by mouth daily       Allergies:     Allergies   Allergen Reactions     Sulfa Drugs Unknown     Tetracycline Rash     Social History:    Occupation/Schooling: on  Tobacco use: no  Alcohol use: no  Drug use: no  History of chemical dependency treatment: no    Family history:  Family History   Problem Relation Age of Onset     Breast Cancer No family hx of      Family history of headaches: no    Review of Systems:  Skin: negative  Eyes: negative  Ears/Nose/Throat: negative  Respiratory: No shortness of breath, dyspnea on exertion, cough, or hemoptysis  Cardiovascular: negative  Gastrointestinal: negative  Genitourinary: negative  Musculoskeletal: negative  Neurologic: negative  Psychiatric: negative  Hematologic/Lymphatic/Immunologic: negative  Endocrine: negative    Physical Exam:  Vitals:    05/20/19 1411   BP: 180/89   Pulse: 102   SpO2: 97%   Weight: 73.9 kg (163 lb)      Exam:  Constitutional: healthy, alert and no distress  Head: normocephalic. Atraumatic.   Eyes: no redness or jaundice noted   ENT: oropharnx normal.  MMM.  Neck supple.    Cardiovascular: Negative JVD  Respiratory: Speaking in full sentences no accessory muscles use   gastrointestinal: soft, non-tende  Skin: no suspicious lesions or rashes  Psychiatric: mentation appears normal and affect normal/bright    Musculoskeletal exam:  Gait/Station/Posture: Antalgic  Lumbar spine:     ROM: wnl   Myofascial tenderness: Positive over right lateral buttocks   Matthews's: Minimal               S   PATRIZIA/FADIR: Negative              SI: Negative   Greater trochanteric bursa: Mild on the right  Neurologic exam:  CN:  Cranial nerves 2-12 are normal  Motor:  5/5 UE and LE strength  Reflexes:     Achilles:  +2    Sensory:  (upper and lower extremities):   Light touch: normal    Allodynia: absent    Dysethesia: absent    Hyperalgesia: absent     Diagnostic tests:         =  Assessment/Plan:  Arline Link is a 78 year old female who presents with the complaints of = right-sided buttocks pain concerning for piriformis syndrome.   Arline was seen today for pain.    Diagnoses and all orders for this visit:    Piriformis syndrome, right  -     [facility]; Future  -     tiZANidine (ZANAFLEX) 2 MG tablet; Take 0.5-1 tablets (1-2 mg) by mouth 2 times daily as needed for muscle spasms         - Further procedures recommended:    - Would consider  a piriformis injection    - Medication Management:    -  would trial zanaflex 1-2mg twice a day as needed. Start with Pm dose for 7days before adding am dose as tolerated    - Discussed would continue acetaminophen can take 1-2 tablets every 8 hours as needed. Max 3 grams   - Physical Therapy: [facility] focus on the piriformis   - Diagnostic Studies: none at this time if radiate symptoms worsen would consider an MRI    - Follow up:   As  needed              Total time spent was 60 minutes, and more than 50% of face to face time was spent counseling and/or coordination of care regarding principles of multidisciplinary care and medication management right-sided buttocks pain    Benson Lennon MD  [facility]  This note was created with voice recognition software, and while reviewed for accuracy, typos may remain.

## 2019-05-20 NOTE — PATIENT INSTRUCTIONS
- Further procedures recommended:    - Would consider  a piriformis injection    - Medication Management:    -  would trial zanaflex 1-2mg twice a day as needed. Start with Pm dose for 7days before adding am dose as tolerated    - Discussed would continue acetaminophen can take 1-2 tablets every 8 hours as needed. Max 3 grams    - Continue to topical cream   - Physical Therapy: [facility] focus on the piriformis   - Diagnostic Studies: none at this time if radiating symptoms worsen would consider an MRI    - Follow up:   As needed        ----------------------------------------------------------------  Clinic Number:  [phone]   Call this number with any questions about your care and for scheduling assistance. Calls are returned Monday through Friday between 8 AM and 4:30 PM. We usually get back to you within 2 business days depending on the issue/request.       Medication refills:    For non-opioid medications, call your pharmacy directly to request a refill. The pharmacy will contact the [facility] for authorization. Please allow 3-4 days for these refills to be processed.     For opioid refills, call the clinic number or send a [employer] message. Please contact us 7-10 days before your refill is due. The message MUST include the name of the specific medication(s) requested and how you would like to receive the prescription(s). The options are as follows:    Pain Clinic staff can mail the prescription to your pharmacy. Please tell us the name of the pharmacy.    You may pick the prescription up at the Pain Clinic (tell us the location) or during a clinic visit with your pain provider    Pain Clinic staff can deliver the prescription to the [facility] in the clinic building. Please tell us the location.      We believe regular attendance is key to your success in our program.    Any time you are unable to keep your appointment we ask that you call us at least 24 hours in advance to  let us know. This will allow us to offer the appointment time to another patient.

## 2019-06-03 ENCOUNTER — THERAPY VISIT (OUTPATIENT)
Dept: PHYSICAL THERAPY | Facility: CLINIC | Age: 79
End: 2019-06-03
Payer: MEDICARE

## 2019-06-03 DIAGNOSIS — G57.01 PIRIFORMIS SYNDROME, RIGHT: ICD-10-CM

## 2019-06-03 DIAGNOSIS — M25.551 HIP PAIN, RIGHT: ICD-10-CM

## 2019-06-03 PROCEDURE — 97162 PT EVAL MOD COMPLEX 30 MIN: CPT | Mod: GP | Performed by: PHYSICAL THERAPIST

## 2019-06-03 PROCEDURE — 97110 THERAPEUTIC EXERCISES: CPT | Mod: GP | Performed by: PHYSICAL THERAPIST

## 2019-06-03 NOTE — LETTER
DEPARTMENT OF HEALTH AND HUMAN SERVICES  CENT[facility] FOR MEDICARE & MEDICAID SERVICES    PLAN/UPDATED PLAN OF PROGRESS FOR OUTPATIENT REHABILITATION    PATIENTS NAME:  Arline Link   : 1940  PROVIDER NUMBER:    [id number]  HICN:  [id number]    PROVIDER NAME: [facility]  MEDICAL RECORD NUMBER: [id number]   START OF CARE DATE:  SOC Date: 19   TYPE:  PT  PRIMARY/TREATMENT DIAGNOSIS: (Pertinent Medical Diagnosis)     Piriformis syndrome, right  Hip pain, right    VISITS FROM START OF CARE:  Rxs Used: 1     [facility] Initial Evaluation  Subjective:  The history is provided by the patient.   Arline Link is a 79 year old female with a lumbar condition.      This is a chronic condition  Pt reports R sided buttock pain radiating to the R thigh since 8 months; difficulty with walking, sit to stand, getting in and out of the car, saw MD on 19 who recommended PT .      Radiates to:  Gluteals right.  Pain is described as aching and sharp  and reported as 10/10 and 5/10.     Symptoms are exacerbated by standing, bending and walking and relieved by analgesics and activity/movement (pain cream ).  Since onset symptoms are gradually worsening.          Pertinent medical history includes:  High blood pressure (thyroid ).      Current medications:  High blood pressure medication, pain medication and thyroid medication.  Current occupation is Retired Pertinent medical history includes:  High blood pressure and thyroid problems (tetryctycline, sulpha).      Current medications:  High blood pressure medication and thyroid medication.    Employment status: Retired.  Employment tasks: Household chores, cleaning etc.        Objective:  Gait:    Gait Type:  Antalgic   Assistive Devices:  None  Lumbar/SI Evaluation  ROM:    AROM Lumbar:   Flexion:          FIS - able to reach ankles; increase thigh pain   Ext:                    Unable - severe  restriction in ROM    Side Bend:        Left:  Painful     Right:  Painful   Rotation:           Left:     Right:   Side Glide:        Left:     Right:   Lumbar Myotomes:    T12-L3 (Hip Flex):  Left: 5    Right: 4-  L2-4 (Quads):  Left:  5    Right:  4  L4 (Ankle DF):  Left:  5    Right:  5  L5 (Great Toe Ext): Left: 5    Right: 5   Neural Tension/Mobility:    Left side:SLR  negative.   PATIENTS NAME:  Arline Link   : 1940    Right side:   SLR  negative.   Lumbar Palpation:    Tenderness not present at Left:    Erector Spinae; Piriformis or PSIS  Tenderness not present at Right:  Erector Spinae; Piriformis or PSIS  Hip Evaluation  HIP AROM:    Flexion: Left: 110    Right:  85  Hip Special Testing:   Special tests hip not assessed: pain relief with R hamstring stretch   Left hip negative for the following special tests:  Ingrid or Fadir/Labrum   Right hip positive for the following special tests:  Ingrid and Fadir/Labrum    Hip Palpation:    Right hip tenderness present at:  IT Band and hip flexors  General   ROS   Pt requires a lot of elevation for supine lying  Pt refused prone lying       Assessment/Plan:    Patient is a 79 year old female with lumbar and right side hip complaints.    Patient has the following significant findings with corresponding treatment plan.                Diagnosis 1:  R IT band syndrome  Pain -  hot/cold therapy, manual therapy, self management, education, directional preference exercise and home program  Decreased ROM/flexibility - manual therapy, therapeutic exercise and home program  Decreased joint mobility - manual therapy, therapeutic exercise and home program  Decreased strength - therapeutic exercise, therapeutic activities and home program  Impaired balance - neuro re-education, therapeutic activities and home program  Impaired gait - gait training and assistive devices  Impaired muscle performance - neuro re-education and home program  Decreased function - therapeutic  activities  Impaired posture - neuro re-education and home program    Therapy Evaluation Codes:   1) History comprised of:   Personal factors that impact the plan of care:      Age, Anxiety, Coping style and Time since onset of symptoms.    Comorbidity factors that impact the plan of care are:      High blood pressure.     Medications impacting care: High blood pressure and Pain.  2) Examination of Body Systems comprised of:   Body structures and functions that impact the plan of care:      Hip and Lumbar spine.   Activity limitations that impact the plan of care are:      Bending, Dressing, Lifting, Sitting, Standing, Walking and Sleeping.  3) Clinical presentation characteristics are:   Evolving/Changing.  4) Decision-Making    High complexity using standardized patient assessment instrument and/or measureable assessment of functional outcome.  PATIENTS NAME:  Arline Link   : 1940    Cumulative Therapy Evaluation is: Moderate complexity.    Previous and current functional limitations:  (See Goal Flow Sheet for this information)    Short term and Long term goals: (See Goal Flow Sheet for this information)     Communication ability:  Patient appears to be able to clearly communicate and understand verbal and written communication and follow directions correctly.  Treatment Explanation - The following has been discussed with the patient:   RX ordered/plan of care  Anticipated outcomes  Possible risks and side effects  This patient would benefit from PT intervention to resume normal activities.   Rehab potential is good.    Frequency:  1 X week, once daily  Duration:  for 10 weeks  Discharge Plan:  Achieve all LTG.  Independent in home treatment program.  Return to previous functional level by discharge.  Reach maximal therapeutic benefit.                                     Caregiver Signature/Credentials _____________________________ Date ________       Treating Provider: Nasreen Mariano, PT, OCS   I have  "reviewed and certified the need for these services and plan of treatment while under my care.        PHYSICIAN'S SIGNATURE:   _________________________________________  Date___________   Benson Lennon MD    Certification period:  Beginning of Cert date period: 06/03/19 to  End of Cert period date: 08/29/19     Functional Level Progress Report: Please see attached \"Goal Flow sheet for Functional level.\"    ____X____ Continue Services or       ________ DC Services                Service dates: From  SOC Date: 06/03/19 date to present                         "

## 2019-06-03 NOTE — PROGRESS NOTES
[facility] Initial Evaluation  Subjective:  The history is provided by the patient.   Arline Link is a 79 year old female with a lumbar condition.      This is a chronic condition  Pt reports R sided buttock pain radiating to the R thigh since 8 months; difficulty with walking, sit to stand, getting in and out of the car, saw MD on 5/20/19 who recommended PT .      Radiates to:  Gluteals right.  Pain is described as aching and sharp  and reported as 10/10 and 5/10.     Symptoms are exacerbated by standing, bending and walking and relieved by analgesics and activity/movement (pain cream ).  Since onset symptoms are gradually worsening.          Pertinent medical history includes:  High blood pressure (thyroid ).      Current medications:  High blood pressure medication, pain medication and thyroid medication.  Current occupation is Retired   .                                    Objective:    Gait:    Gait Type:  Antalgic   Assistive Devices:  None                 Lumbar/SI Evaluation  ROM:    AROM Lumbar:   Flexion:          FIS - able to reach ankles; increase thigh pain   Ext:                    Unable - severe restriction in ROM    Side Bend:        Left:  Painful     Right:  Painful   Rotation:           Left:     Right:   Side Glide:        Left:     Right:           Lumbar Myotomes:    T12-L3 (Hip Flex):  Left: 5    Right: 4-  L2-4 (Quads):  Left:  5    Right:  4  L4 (Ankle DF):  Left:  5    Right:  5  L5 (Great Toe Ext): Left: 5    Right: 5           Neural Tension/Mobility:      Left side:SLR  negative.     Right side:   SLR  negative.   Lumbar Palpation:      Tenderness not present at Left:    Erector Spinae; Piriformis or PSIS    Tenderness not present at Right:  Erector Spinae; Piriformis or PSIS                                          Hip Evaluation  HIP AROM:    Flexion: Left: 110    Right:  85                      Hip Special Testing:   Special tests hip not assessed: pain  relief with R hamstring stretch     Left hip negative for the following special tests:  Ingrid or Fadir/Labrum   Right hip positive for the following special tests:  Ingrid and Fadir/Labrum    Hip Palpation:      Right hip tenderness present at:  IT Band and hip flexors             General     ROS   Pt requires a lot of elevation for supine lying  Pt refused prone lying       Assessment/Plan:    Patient is a 79 year old female with lumbar and right side hip complaints.    Patient has the following significant findings with corresponding treatment plan.                Diagnosis 1:  R IT band syndrome  Pain -  hot/cold therapy, manual therapy, self management, education, directional preference exercise and home program  Decreased ROM/flexibility - manual therapy, therapeutic exercise and home program  Decreased joint mobility - manual therapy, therapeutic exercise and home program  Decreased strength - therapeutic exercise, therapeutic activities and home program  Impaired balance - neuro re-education, therapeutic activities and home program  Impaired gait - gait training and assistive devices  Impaired muscle performance - neuro re-education and home program  Decreased function - therapeutic activities  Impaired posture - neuro re-education and home program    Therapy Evaluation Codes:   1) History comprised of:   Personal factors that impact the plan of care:      Age, Anxiety, Coping style and Time since onset of symptoms.    Comorbidity factors that impact the plan of care are:      High blood pressure.     Medications impacting care: High blood pressure and Pain.  2) Examination of Body Systems comprised of:   Body structures and functions that impact the plan of care:      Hip and Lumbar spine.   Activity limitations that impact the plan of care are:      Bending, Dressing, Lifting, Sitting, Standing, Walking and Sleeping.  3) Clinical presentation characteristics  are:   Evolving/Changing.  4) Decision-Making    High complexity using standardized patient assessment instrument and/or measureable assessment of functional outcome.  Cumulative Therapy Evaluation is: Moderate complexity.    Previous and current functional limitations:  (See Goal Flow Sheet for this information)    Short term and Long term goals: (See Goal Flow Sheet for this information)     Communication ability:  Patient appears to be able to clearly communicate and understand verbal and written communication and follow directions correctly.  Treatment Explanation - The following has been discussed with the patient:   RX ordered/plan of care  Anticipated outcomes  Possible risks and side effects  This patient would benefit from PT intervention to resume normal activities.   Rehab potential is good.    Frequency:  1 X week, once daily  Duration:  for 10 weeks  Discharge Plan:  Achieve all LTG.  Independent in home treatment program.  Return to previous functional level by discharge.  Reach maximal therapeutic benefit.    Please refer to the daily flowsheet for treatment today, total treatment time and time spent performing 1:1 timed codes.

## 2019-06-12 ENCOUNTER — THERAPY VISIT (OUTPATIENT)
Dept: PHYSICAL THERAPY | Facility: CLINIC | Age: 79
End: 2019-06-12
Payer: MEDICARE

## 2019-06-12 DIAGNOSIS — M25.551 HIP PAIN, RIGHT: ICD-10-CM

## 2019-06-12 PROCEDURE — 97110 THERAPEUTIC EXERCISES: CPT | Mod: GP | Performed by: PHYSICAL THERAPIST

## 2019-06-12 PROCEDURE — 97140 MANUAL THERAPY 1/> REGIONS: CPT | Mod: GP | Performed by: PHYSICAL THERAPIST

## 2019-06-19 ENCOUNTER — TELEPHONE (OUTPATIENT)
Dept: PALLIATIVE MEDICINE | Facility: CLINIC | Age: 79
End: 2019-06-19

## 2019-06-19 NOTE — TELEPHONE ENCOUNTER
Forms received from [facility] asking for documentation and signature. Placed forms in to Dr.Snitzer ashley.     Aleida Keen MA

## 2019-06-20 NOTE — TELEPHONE ENCOUNTER
Signed document faxed back to [facility]. RightFax confirmed. Sent to scanning.

## 2019-07-01 ENCOUNTER — THERAPY VISIT (OUTPATIENT)
Dept: PHYSICAL THERAPY | Facility: CLINIC | Age: 79
End: 2019-07-01
Payer: MEDICARE

## 2019-07-01 DIAGNOSIS — M25.551 HIP PAIN, RIGHT: ICD-10-CM

## 2019-07-01 PROCEDURE — 97530 THERAPEUTIC ACTIVITIES: CPT | Mod: GP | Performed by: PHYSICAL THERAPIST

## 2019-07-01 PROCEDURE — 97110 THERAPEUTIC EXERCISES: CPT | Mod: GP | Performed by: PHYSICAL THERAPIST

## 2019-07-08 ENCOUNTER — THERAPY VISIT (OUTPATIENT)
Dept: PHYSICAL THERAPY | Facility: CLINIC | Age: 79
End: 2019-07-08
Payer: MEDICARE

## 2019-07-08 DIAGNOSIS — M25.551 HIP PAIN, RIGHT: ICD-10-CM

## 2019-07-08 PROCEDURE — 97110 THERAPEUTIC EXERCISES: CPT | Mod: GP | Performed by: PHYSICAL THERAPIST

## 2019-07-17 ENCOUNTER — THERAPY VISIT (OUTPATIENT)
Dept: PHYSICAL THERAPY | Facility: CLINIC | Age: 79
End: 2019-07-17
Payer: MEDICARE

## 2019-07-17 DIAGNOSIS — M25.551 HIP PAIN, RIGHT: ICD-10-CM

## 2019-07-17 PROCEDURE — 97110 THERAPEUTIC EXERCISES: CPT | Mod: GP | Performed by: PHYSICAL THERAPIST

## 2019-07-17 PROCEDURE — 97530 THERAPEUTIC ACTIVITIES: CPT | Mod: GP | Performed by: PHYSICAL THERAPIST

## 2019-07-17 NOTE — PROGRESS NOTES
Subjective:  HPI                    Objective:  System    Physical Exam    General     ROS    Assessment/Plan:    PROGRESS  REPORT    Progress reporting period is from 6/3/19 to 7/17/19.     SUBJECTIVE  Subjective: Pt has on and off days; did her laundry yesterday; climbs up and down stairs 4x a day 14 steps; still limping, reports pain over ant thigh R    Current Pain level: 4/10   Initial Pain level: 10/10        ;   ,     The subjective and objective information are from the last SOAP note on this patient.    OBJECTIVE  Objective: unable to stand on R LE even with support, dificulty with sit to stand without UE support control improves with repitition, pt refusing to use a cane for ambulation; pt has had 5 PT sessions focussing on ROM and strengthening with mild improvement in function; she would benefit from further testing      ASSESSMENT/PLAN  Updated problem list and treatment plan: Diagnosis 1:  R hip pain   STG/LTGs have been met or progress has been made towards goals:  None  Assessment of Progress: The patient's progress has slowed.  Self Management Plans:  Patient has been instructed in a home treatment program.  I have re-evaluated this patient and find that the nature, scope, duration and intensity of the therapy is appropriate for the medical condition of the patient.  Arline continues to require the following intervention to meet STG and LTG's: PT  The patient is returning to your office for a recheck appointment.    Recommendations:  This patient would benefit from continued therapy.     Frequency:  1 X week, once daily  Duration:  for 6 weeks      This patient would benefit from further evaluation.    Please refer to the daily flowsheet for treatment today, total treatment time and time spent performing 1:1 timed codes.

## 2019-07-30 ENCOUNTER — THERAPY VISIT (OUTPATIENT)
Dept: PHYSICAL THERAPY | Facility: CLINIC | Age: 79
End: 2019-07-30
Payer: MEDICARE

## 2019-07-30 DIAGNOSIS — M25.551 HIP PAIN, RIGHT: ICD-10-CM

## 2019-07-30 PROCEDURE — 97530 THERAPEUTIC ACTIVITIES: CPT | Mod: GP | Performed by: PHYSICAL THERAPIST

## 2019-07-30 PROCEDURE — 97110 THERAPEUTIC EXERCISES: CPT | Mod: GP | Performed by: PHYSICAL THERAPIST

## 2019-08-05 ENCOUNTER — THERAPY VISIT (OUTPATIENT)
Dept: PHYSICAL THERAPY | Facility: CLINIC | Age: 79
End: 2019-08-05
Payer: MEDICARE

## 2019-08-05 ENCOUNTER — OFFICE VISIT (OUTPATIENT)
Dept: PALLIATIVE MEDICINE | Facility: CLINIC | Age: 79
End: 2019-08-05
Payer: MEDICARE

## 2019-08-05 VITALS
WEIGHT: 157.1 LBS | OXYGEN SATURATION: 96 % | HEART RATE: 102 BPM | SYSTOLIC BLOOD PRESSURE: 158 MMHG | DIASTOLIC BLOOD PRESSURE: 90 MMHG | BODY MASS INDEX: 28.05 KG/M2

## 2019-08-05 DIAGNOSIS — M25.551 HIP PAIN, RIGHT: ICD-10-CM

## 2019-08-05 DIAGNOSIS — M79.18 MYOFASCIAL MUSCLE PAIN: ICD-10-CM

## 2019-08-05 DIAGNOSIS — M25.551 HIP PAIN, RIGHT: Primary | ICD-10-CM

## 2019-08-05 PROCEDURE — 97530 THERAPEUTIC ACTIVITIES: CPT | Mod: GP | Performed by: PHYSICAL THERAPIST

## 2019-08-05 PROCEDURE — 97110 THERAPEUTIC EXERCISES: CPT | Mod: GP | Performed by: PHYSICAL THERAPIST

## 2019-08-05 PROCEDURE — 99214 OFFICE O/P EST MOD 30 MIN: CPT | Performed by: PAIN MEDICINE

## 2019-08-05 ASSESSMENT — PAIN SCALES - GENERAL: PAINLEVEL: NO PAIN (1)

## 2019-08-05 NOTE — PROGRESS NOTES
PROGRESS  REPORT    Progress reporting period is from 6/3/2019 to 8/5/2019.  Arline has been seen in PT 7 times for treatment of right hip/thigh pain.  Treatment has included ROM, strengthening and gait training.  Patient has been very anxious throughout treatment.  Her walking tolerance is improving but she still has significant confounding objective deficits.  She initially refused ambulation with assistive device, but has finally purchased a cane.  She continues to have anterior right leg pain but gait is much improved with cane.     SUBJECTIVE     Patient complains of anterior right thigh pain, currently worse after carrying a load of laundry up stairs.  Right buttock / hip pain has decreased.  Current pain level is 5/10  .     .   Changes in function:  Yes (See Goal flowsheet attached for changes in current functional level)  Adverse reaction to treatment or activity: None    OBJECTIVE  Changes noted in objective findings:  Gait with cane mildly antalgic, without cane continues significantly antalgic.  Right hip ROM is significantly limited - to 90 degrees flexion and IR to neutral only.  Right hip flexion strength is 3+/5 with anterior thigh pain.  Patient initiallly is unable to do SLR, but with encouragement can do independently. Lumbar movement testing is inconclusive.   -          ASSESSMENT/PLAN  Updated problem list and treatment plan: Diagnosis 1:  Right thigh pain  Pain -  self management, education and home program  Decreased ROM/flexibility - manual therapy and therapeutic exercise  Decreased strength - therapeutic exercise and therapeutic activities  Impaired gait - gait training  Decreased function - therapeutic activities  STG/LTGs have been met or progress has been made towards goals:  Yes (See Goal flow sheet completed today.)  Assessment of Progress: The patient's condition is improving.  Self Management Plans:  Patient has been instructed in a home treatment program.  I have re-evaluated this  patient and find that the nature, scope, duration and intensity of the therapy is appropriate for the medical condition of the patient.  Arline continues to require the following intervention to meet STG and LTG's:  PT    Recommendations:  This patient would benefit from continued therapy.     Frequency:  1 X week, once daily  Duration:  for 6 weeks    Patient would benefit from further evaluation      Please refer to the daily flowsheet for treatment today, total treatment time and time spent performing 1:1 timed codes.

## 2019-08-05 NOTE — PROGRESS NOTES
[facility] Follow Up  Chief Complaint:    Chief Complaint   Patient presents with     Pain     MME prescribed prior to seeing patient:  Current MME:  Recommendations:  - Further procedures recommended:    - Would consider  a piriformis injection    - Medication Management:    -  would trial zanaflex 1-2mg twice a day as needed. Start with Pm dose for 7days before adding am dose as tolerated    - Discussed would continue acetaminophen can take 1-2 tablets every 8 hours as needed. Max 3 grams   - Physical Therapy: [facility] focus on the piriformis   - Diagnostic Studies: none at this time if radiate s ymptoms worsen would consider an MRI    - Follow up:   As needed        Interval: Patient is started physical therapy with some improvement.  Additionally, patient has started to use a cane for balance.  Patient continues to want to avoid any medications.  Additionally she is very hesitant to have any procedure done.  Additionally, patient wants to avoid any x-ray.  The daughter today thought she was here today to get an x-ray.  I discussed with them at length and an x-ray is not required, but would rather give us information of whether some of her symptoms may be coming from her head.  Of note most the patient's pain today is over her anterior thigh.  This is different than her normal pain.  The patient has been doing more therapy, and she feels this may have caused this new anterior pain.  The pain is intermittent.  The pain is squeezing in nature.  The pain is worse with walking.  She denies any numbness tingling burning.  She denies any new overt progressive weakness.  Of note the patient reports her buttocks pain is significantly better.      Pain history: At times extremely difficult to get a history.  Patient is tangential at times.  Patient's daughter is present during the appointment and assistance with history.  Arline Link is a 78 year old female who first started having  problems with pain apprxo 20 yrs ago.  The patient reports having a fall.  At that time she had a large lump/bruise.  The pain gradually improved over time.  She did not have any specific interventions at that time.  The pain has gradually gotten worse over the last year.  The pain is mostly located over her right lateral buttocks.  The pain is a deep aching pain.  The pain occasionally radiate out to her lateral leg/hip.  She denies any numbness burning tingling.  She denies any radiation to her lower extremity.  The pain is worse with prolonged sitting.  The pain is worse when going from a seated to standing position.  The pain is worse with prolonged walking.  The pain is slightly better with rest.  She denies any overt weakness.  She denies any incontinence.  Overall the patient's biggest concern is maintaining her current mobility.  Of note patient is very nervous about her procedures.      Pain rating: intensity  Averages 4/10 on a 0-10 scale.      Current treatments include:  Topical creams  Acetaminophen as needed  Zanaflex-has not taken  Previous medication treatments included:  Not sure    Other treatments have included:  Arline BRE Slaughtercharles has not been seen at a pain clinic in the past.    PT: no  Chiropractic: no  Acupuncture: no  TENs Unit: no  Injections: no    Past Medical History:  Past Medical History:   Diagnosis Date     Benign essential hypertension 2016    started med by endocrine 11/16     Elevated ALT measurement 2015    fu nl 12/16     Hypercholesteremia      Hypothyroid 2012    Dr. Schmitt     Mucinosis of skin     Dr. Navas     Vitamin D deficiency      Past Surgical History:  Past Surgical History:   Procedure Laterality Date     APPENDECTOMY  14     TONSILLECTOMY & ADENOIDECTOMY  5     Medications:  Current Outpatient Medications   Medication Sig Dispense Refill     acetaminophen (TYLENOL) 325 MG tablet Take 325-650 mg by mouth every 6 hours as needed for mild pain       levothyroxine  (SYNTHROID) 50 MCG tablet Take 1 tablet (50 mcg) by mouth daily Synthroid DAW1 90 tablet 3     losartan (COZAAR) 50 MG tablet TAKE 1 TABLET(50 MG) BY MOUTH DAILY 90 tablet 3     tiZANidine (ZANAFLEX) 2 MG tablet Take 0.5-1 tablets (1-2 mg) by mouth 2 times daily as needed for muscle spasms (Patient not taking: Reported on 8/5/2019) 30 tablet 0     Vitamin D, Cholecalciferol, 1000 units TABS Take 2,000 Units by mouth daily       Allergies:     Allergies   Allergen Reactions     Sulfa Drugs Unknown     Tetracycline Rash     Social History:    Occupation/Schooling: on  Tobacco use: no  Alcohol use: no  Drug use: no  History of chemical dependency treatment: no    Family history:  Family History   Problem Relation Age of Onset     Breast Cancer No family hx of      Family history of headaches: no    Review of Systems:  Skin: negative  Eyes: negative  Ears/Nose/Throat: negative  Respiratory: No shortness of breath, dyspnea on exertion, cough, or hemoptysis  Cardiovascular: negative  Gastrointestinal: negative  Genitourinary: negative  Musculoskeletal: negative  Neurologic: negative  Psychiatric: negative  Hematologic/Lymphatic/Immunologic: negative  Endocrine: negative    Physical Exam:  Vitals:    08/05/19 1353   BP: (!) 158/90   Pulse: 102   SpO2: 96%   Weight: 71.3 kg (157 lb 1.6 oz)     Exam:  Constitutional: healthy, alert and no distress  Head: normocephalic. Atraumatic.   Eyes: no redness or jaundice noted   ENT: oropharnx normal.  MMM.  Neck supple.    Cardiovascular: Negative JVD  Respiratory: Speaking in full sentences no accessory muscles use   gastrointestinal: soft, non-tende  Skin: no suspicious lesions or rashes  Psychiatric: mentation appears normal and affect normal/bright    Musculoskeletal exam:  Gait/Station/Posture: Antalgic uses cane   Lumbar spine:     ROM: wnl   Myofascial tenderness: minimal over her buttocks     PATRIZIA/FADIR + on the right               SI: Negative   Greater trochanteric bursa:  Mild on the right  Positive TTP over her quads on the right side  Neurologic exam:  CN:  Cranial nerves 2-12 are normal  Motor:  5/5  LE strength  Reflexes:     Achilles:  +2    Sensory:  (upper and lower extremities):   Light touch: normal     Diagnostic tests:         =  Assessment/Plan:  Arline Link is a 78 year old female who presents with the complaints of mainly anterior/medial thigh pain . The pt reports her buttocks pain feels considerably better.   Arline was seen today for pain.    Diagnoses and all orders for this visit:    Hip pain, right    Myofascial muscle pain         - Further procedures recommended:    - Would not recommend at this time  - Medication Management:    - Reasonable to use topical creams    - Reasonable to try Salonpas    -  Consider zanaflex 1-2mg at night if symptoms very severe   - Discussed would continue acetaminophen can take 1-2 tablets every 8 hours as needed. Max 3 grams   - Physical Therapy: continue   - Diagnostic Studies: If anterior/medial thigh pain persists would get hip xray  - Follow up:   As needed              Total time spent was 30 minutes, and more than 50% of face to face time was spent counseling and/or coordination of care regarding principles of multidisciplinary care and medication management right-sided buttocks pain    Benson Lennon MD  [facility]  This note was created with voice recognition software, and while reviewed for accuracy, typos may remain.

## 2019-08-05 NOTE — PROGRESS NOTES
[facility] Initial Evaluation  Subjective:  HPI                    Objective:  System    Physical Exam    General     ROS    Assessment/Plan:    {REHAB NOTES:425815}

## 2019-08-05 NOTE — PATIENT INSTRUCTIONS
- Further procedures recommended:    - Would not recommend at this time  - Medication Management:    - Reasonable to use topical creams    - Reasonable to try Salonpas    -  Consider zanaflex 1-2mg at night if symptoms very severe   - Discussed would continue acetaminophen can take 1-2 tablets every 8 hours as needed. Max 3 grams   - Physical Therapy: continue   - Diagnostic Studies: If anterior/medial thigh pain persists would get hip xray  - Follow up:   As needed      ----------------------------------------------------------------  Clinic Number:  [phone]     Call with any questions about your care and for scheduling assistance.     Calls are returned Monday through Friday between 8 AM and 4:30 PM. We usually get back to you within 2 business days depending on the issue/request.    If we are prescribing your medications:    For opioid medication refills, call the clinic or send a [employer] message 7 days in advance.  Please include:    Name of requested medication    Name of the pharmacy.    For non-opioid medications, call your pharmacy directly to request a refill. Please allow 3-4 days to be processed.     Per [place] State Law:    All controlled substance prescriptions must be filled within 30 days of being written.      For those controlled substances allowing refills, pickup must occur within 30 days of last fill.      We believe regular attendance is key to your success in our program!      Any time you are unable to keep your appointment we ask that you call us at least 24 hours in advance to cancel.This will allow us to offer the appointment time to another patient.     Multiple missed appointments may lead to dismissal from the clinic.

## 2019-08-19 ENCOUNTER — THERAPY VISIT (OUTPATIENT)
Dept: PHYSICAL THERAPY | Facility: CLINIC | Age: 79
End: 2019-08-19
Payer: MEDICARE

## 2019-08-19 DIAGNOSIS — M25.551 HIP PAIN, RIGHT: ICD-10-CM

## 2019-08-19 PROCEDURE — 97140 MANUAL THERAPY 1/> REGIONS: CPT | Mod: GP | Performed by: PHYSICAL THERAPIST

## 2019-08-19 PROCEDURE — 97110 THERAPEUTIC EXERCISES: CPT | Mod: GP | Performed by: PHYSICAL THERAPIST

## 2019-08-19 PROCEDURE — 97530 THERAPEUTIC ACTIVITIES: CPT | Mod: GP | Performed by: PHYSICAL THERAPIST

## 2019-08-27 ENCOUNTER — THERAPY VISIT (OUTPATIENT)
Dept: PHYSICAL THERAPY | Facility: CLINIC | Age: 79
End: 2019-08-27
Payer: MEDICARE

## 2019-08-27 DIAGNOSIS — M25.551 HIP PAIN, RIGHT: ICD-10-CM

## 2019-08-27 PROCEDURE — 97530 THERAPEUTIC ACTIVITIES: CPT | Mod: GP | Performed by: PHYSICAL THERAPIST

## 2019-08-27 PROCEDURE — 97140 MANUAL THERAPY 1/> REGIONS: CPT | Mod: GP | Performed by: PHYSICAL THERAPIST

## 2019-08-27 PROCEDURE — 97110 THERAPEUTIC EXERCISES: CPT | Mod: GP | Performed by: PHYSICAL THERAPIST

## 2019-09-03 ENCOUNTER — THERAPY VISIT (OUTPATIENT)
Dept: PHYSICAL THERAPY | Facility: CLINIC | Age: 79
End: 2019-09-03
Payer: MEDICARE

## 2019-09-03 DIAGNOSIS — M25.551 HIP PAIN, RIGHT: Primary | ICD-10-CM

## 2019-09-03 PROCEDURE — 97140 MANUAL THERAPY 1/> REGIONS: CPT | Mod: GP | Performed by: PHYSICAL THERAPIST

## 2019-09-03 PROCEDURE — 97110 THERAPEUTIC EXERCISES: CPT | Mod: GP | Performed by: PHYSICAL THERAPIST

## 2019-09-03 PROCEDURE — 97530 THERAPEUTIC ACTIVITIES: CPT | Mod: GP | Performed by: PHYSICAL THERAPIST

## 2019-09-03 NOTE — LETTER
"DEPARTMENT OF HEALTH AND HUMAN SERVICES  CENTERS FOR MEDICARE & MEDICAID SERVICES    PLAN/UPDATED PLAN OF PROGRESS FOR OUTPATIENT REHABILITATION    PATIENTS NAME:  Arline Link     : 1940    PROVIDER NUMBER:    [id number]    HICN:  [id number]     PROVIDER NAME: [facility]    MEDICAL RECORD NUMBER: [id number]     START OF CARE DATE:  SOC Date: 19   TYPE:  PT    PRIMARY/TREATMENT DIAGNOSIS: (Pertinent Medical Diagnosis)  Hip pain, right    VISITS FROM START OF CARE:  Rxs Used: 10     PROGRESS  REPORT    Progress reporting period is from 6/3/2019 to 9/3/2019.   Arline has been seen in PT 9 times for treatment of right hip pain.  Treatment has included manual therapy, hip ROM and strengthening exercises and gait training with cane.  SUBJECTIVE   Subjective: \"I twisted my leg today\" with increased ant R thigh c/o.  \"but somedays I don't feel it at all\"    Current pain level is 5/10  .      Initial Pain level: 10/10.   Changes in function:  See goal flow sheet  Adverse reaction to treatment or activity: None    OBJECTIVE  Changes noted in objective findings:    Objective: able to go sit to stand without arm rests, but requires much encouragement.  Able to do independent SLR but again requires encouragement.  R hip flexion and IR continue significantly limited      ASSESSMENT/PLAN  Updated problem list and treatment plan: Diagnosis 1:  Right hip pain  Pain -  manual therapy, self management, education and home program  Decreased ROM/flexibility - manual therapy and therapeutic exercise  Decreased strength - therapeutic exercise and therapeutic activities  Impaired gait - gait training  Decreased function - therapeutic activities  STG/LTGs have been met or progress has been made towards goals:   (See Goal flow sheet completed today.)  Assessment of Progress: The patient's condition has potential to improve.  Self Management Plans:  Patient has been instructed in " "a home treatment program.    PATIENTS NAME:  Arline Link   : 1940    I have re-evaluated this patient and find that the nature, scope, duration and intensity of the therapy is appropriate for the medical condition of the patient.  Arline continues to require the following intervention to meet STG and LTG's:  PT    Recommendations:  This patient would benefit from continued therapy.     Frequency:  1 X week, once daily  Duration:  for 7 weeks        Caregiver Signature/Credentials _____________________________ Date ________       Treating Provider: Cindy Pedro PT [facility]   I have reviewed and certified the need for these services and plan of treatment while under my care.        PHYSICIAN'S SIGNATURE:   _____________________________________  Date___________    Benson Lennon MD    Certification period:  Beginning of Cert date period: 19 to  End of Cert period date: 19     Functional Level Progress Report: Please see attached \"Goal Flow sheet for Functional level.\"    ____X____ Continue Services or       ________ DC Services                Service dates: From  SOC Date: 19 date to present                         "

## 2019-09-10 ENCOUNTER — THERAPY VISIT (OUTPATIENT)
Dept: PHYSICAL THERAPY | Facility: CLINIC | Age: 79
End: 2019-09-10
Payer: MEDICARE

## 2019-09-10 DIAGNOSIS — M25.551 HIP PAIN, RIGHT: ICD-10-CM

## 2019-09-10 PROCEDURE — 97530 THERAPEUTIC ACTIVITIES: CPT | Mod: GP | Performed by: PHYSICAL THERAPIST

## 2019-09-10 PROCEDURE — 97110 THERAPEUTIC EXERCISES: CPT | Mod: GP | Performed by: PHYSICAL THERAPIST

## 2019-09-16 ENCOUNTER — TELEPHONE (OUTPATIENT)
Dept: PALLIATIVE MEDICINE | Facility: CLINIC | Age: 79
End: 2019-09-16

## 2019-09-16 DIAGNOSIS — M25.551 HIP PAIN, RIGHT: Primary | ICD-10-CM

## 2019-09-16 NOTE — TELEPHONE ENCOUNTER
Patient called and stated she would like to talk to Dr. Lennon, she stated he is suppose to order an MRI for her.      Mariam Angeles    [facility]

## 2019-09-17 NOTE — TELEPHONE ENCOUNTER
Patient called, needing to speak to a nurse about MRI.      Mariam Angeles    [facility]

## 2019-09-17 NOTE — TELEPHONE ENCOUNTER
Pt stated that she went to therapy and PT wanted Patient to have an xray before they continue PT    Will place order for hip xray     Pt called and notified.    Cody Alexander, RN  Care Coordinator   [facility]

## 2019-09-18 ENCOUNTER — ANCILLARY PROCEDURE (OUTPATIENT)
Dept: GENERAL RADIOLOGY | Facility: CLINIC | Age: 79
End: 2019-09-18
Attending: PAIN MEDICINE
Payer: MEDICARE

## 2019-09-18 DIAGNOSIS — M25.551 HIP PAIN, RIGHT: ICD-10-CM

## 2019-09-18 PROCEDURE — 73502 X-RAY EXAM HIP UNI 2-3 VIEWS: CPT

## 2019-09-20 ENCOUNTER — TELEPHONE (OUTPATIENT)
Dept: PALLIATIVE MEDICINE | Facility: CLINIC | Age: 79
End: 2019-09-20

## 2019-09-20 DIAGNOSIS — M25.551 HIP PAIN, RIGHT: Primary | ICD-10-CM

## 2019-09-20 NOTE — TELEPHONE ENCOUNTER
Called Pt, Pt would like to have injection.    Order placed    Cody Alexander, SYLVESTER  Care Coordinator   [facility]

## 2019-09-20 NOTE — TELEPHONE ENCOUNTER
Patient was found to have severe OA on her bjorn x-ray.  This may be responsible for significant portion of her pain.  If patient interested would consider a injection.  This would be performed under fluoroscopy.  Please advised on how she would like to proceed.

## 2019-09-24 ENCOUNTER — TELEPHONE (OUTPATIENT)
Dept: PALLIATIVE MEDICINE | Facility: CLINIC | Age: 79
End: 2019-09-24

## 2019-09-24 NOTE — TELEPHONE ENCOUNTER
Writer attempted to call pt, No answer.  LVM for Pt to call writer back at [phone].    Writer informed Pt to continue PT.  Results sent to Pt.    Cody Alexander, RN  Care Coordinator   [facility]

## 2019-09-24 NOTE — TELEPHONE ENCOUNTER
Called patient to schedule Hip Injection. Patient stated that she is still thinking about it. She would like a copy of the report of her x-ray and she would also like to speak to someone and discuss if she should continue with therapy. Phone #[phone]        Jolene Peacock    [facility]

## 2019-09-27 ENCOUNTER — THERAPY VISIT (OUTPATIENT)
Dept: PHYSICAL THERAPY | Facility: CLINIC | Age: 79
End: 2019-09-27
Payer: MEDICARE

## 2019-09-27 DIAGNOSIS — M25.551 HIP PAIN, RIGHT: ICD-10-CM

## 2019-09-27 PROCEDURE — 97140 MANUAL THERAPY 1/> REGIONS: CPT | Mod: GP | Performed by: PHYSICAL THERAPIST

## 2019-09-27 PROCEDURE — 97110 THERAPEUTIC EXERCISES: CPT | Mod: GP | Performed by: PHYSICAL THERAPIST

## 2019-09-30 ENCOUNTER — THERAPY VISIT (OUTPATIENT)
Dept: PHYSICAL THERAPY | Facility: CLINIC | Age: 79
End: 2019-09-30
Payer: MEDICARE

## 2019-09-30 DIAGNOSIS — M25.551 HIP PAIN, RIGHT: ICD-10-CM

## 2019-09-30 PROCEDURE — 97110 THERAPEUTIC EXERCISES: CPT | Mod: GP | Performed by: PHYSICAL THERAPIST

## 2019-09-30 PROCEDURE — 97140 MANUAL THERAPY 1/> REGIONS: CPT | Mod: GP | Performed by: PHYSICAL THERAPIST

## 2019-09-30 NOTE — PROGRESS NOTES
"PROGRESS  REPORT    Progress reporting period is from 6/3/2019 to 9/3/2019.   Arline has been seen in PT 9 times for treatment of right hip pain.  Treatment has included manual therapy, hip ROM and strengthening exercises and gait training with cane.  SUBJECTIVE   Subjective: \"I twisted my leg today\" with increased ant R thigh c/o.  \"but somedays I don't feel it at all\"    Current pain level is 5/10  .      Initial Pain level: 10/10.   Changes in function:  See goal flow sheet  Adverse reaction to treatment or activity: None    OBJECTIVE  Changes noted in objective findings:    Objective: able to go sit to stand without arm rests, but requires much encouragement.  Able to do independent SLR but again requires encouragement.  R hip flexion and IR continue significantly limited      ASSESSMENT/PLAN  Updated problem list and treatment plan: Diagnosis 1:  Right hip pain  Pain -  manual therapy, self management, education and home program  Decreased ROM/flexibility - manual therapy and therapeutic exercise  Decreased strength - therapeutic exercise and therapeutic activities  Impaired gait - gait training  Decreased function - therapeutic activities  STG/LTGs have been met or progress has been made towards goals:   (See Goal flow sheet completed today.)  Assessment of Progress: The patient's condition has potential to improve.  Self Management Plans:  Patient has been instructed in a home treatment program.  I have re-evaluated this patient and find that the nature, scope, duration and intensity of the therapy is appropriate for the medical condition of the patient.  Arline continues to require the following intervention to meet STG and LTG's:  PT    Recommendations:  This patient would benefit from continued therapy.     Frequency:  1 X week, once daily  Duration:  for 7 weeks        Please refer to the daily flowsheet for treatment today, total treatment time and time spent performing 1:1 timed codes.        "

## 2019-10-08 ENCOUNTER — THERAPY VISIT (OUTPATIENT)
Dept: PHYSICAL THERAPY | Facility: CLINIC | Age: 79
End: 2019-10-08
Payer: MEDICARE

## 2019-10-08 DIAGNOSIS — M25.551 HIP PAIN, RIGHT: ICD-10-CM

## 2019-10-08 PROCEDURE — 97110 THERAPEUTIC EXERCISES: CPT | Mod: GP | Performed by: PHYSICAL THERAPIST

## 2019-10-08 PROCEDURE — 97140 MANUAL THERAPY 1/> REGIONS: CPT | Mod: GP | Performed by: PHYSICAL THERAPIST

## 2019-10-08 NOTE — PROGRESS NOTES
DISCHARGE REPORT    Progress reporting period is from 6/3/2019 to 10/8/2019.   Arline has been seen in PT 14 times for treatment of right hip pain with severe OA.  Treatment has included manual therapy, ROM and strengthening exercises.    SUBJECTIVE   Subjective: Patient continues to experience right anterior hip and thigh pain but she is adament about not having surgery and is fearfull of injection.      Current Pain level: 6/10.        Initial Pain level: 10/10.   Changes in function:  See goal flow sheet  Adverse reaction to treatment or activity:     OBJECTIVE  Changes noted in objective findings:    Objective: right hip ROM is significantly limited:  flexion 80 deg, abd 11 deg, IR lacks 11 deg from neutral, ER 16 deg.  Patient ambulates with cane with forward flexed posture and short step length.     ASSESSMENT/PLAN    STG/LTGs have been met or progress has been made towards goals:  See goal flow sheet  Assessment of Progress: The patient's condition is unchanged.  Self Management Plans:  Patient is independent in a home treatment program.    Arline continues to require the following intervention to meet STG and LTG's:  PT intervention is no longer required to meet STG/LTG.    Recommendations:  This patient is ready to be discharged from therapy and continue their home treatment program.    Please refer to the daily flowsheet for treatment today, total treatment time and time spent performing 1:1 timed codes.

## 2020-01-24 DIAGNOSIS — E03.9 HYPOTHYROIDISM, UNSPECIFIED TYPE: ICD-10-CM

## 2020-01-24 NOTE — TELEPHONE ENCOUNTER
"levothyroxine (SYNTHROID) 50 MCG tablet    Last Written Prescription Date:  2/5/2019  Last Fill Quantity: 90,  # refills: 3   Last office visit: 1/4/2019 with prescribing provider:  Dr. Schmitt    Future Office Visit:   Next 5 appointments (look out 90 days)    Feb 14, 2020 10:30 AM CST  PHYSICAL with Gonzales Ayoub MD  [facility] ([facility]) [address] [facility] [address] [phone]         Requested Prescriptions   Pending Prescriptions Disp Refills     levothyroxine (SYNTHROID) 50 MCG tablet 90 tablet 1     Sig: Take 1 tablet (50 mcg) by mouth daily Synthroid DAW1       Thyroid Protocol Failed - 1/24/2020  8:59 AM        Failed - Recent (12 mo) or future (30 days) visit within the authorizing provider's specialty     Patient has had an office visit with the authorizing provider or a provider within the authorizing providers department within the previous 12 mos or has a future within next 30 days. See \"Patient Info\" tab in inbasket, or \"Choose Columns\" in Meds & Orders section of the refill encounter.              Failed - Normal TSH on file in past 12 months     Recent Labs   Lab Test 01/04/19  1200   TSH 1.96              Passed - Patient is 12 years or older        Passed - Medication is active on med list        Passed - No active pregnancy on record     If patient is pregnant or has had a positive pregnancy test, please check TSH.          Passed - No positive pregnancy test in past 12 months     If patient is pregnant or has had a positive pregnancy test, please check TSH.            "

## 2020-01-24 NOTE — TELEPHONE ENCOUNTER
Reason for Call:  Medication or medication refill:    Do you use a [facility]?  Name of the pharmacy and phone number for the current request:  [id number] - [place], [place] - [address] AT [facility] [address]    Name of the medication requested: levothyroxine (SYNTHROID) 50 MCG tablet     Other request: Pt has upcoming labs with Dr Ayoub and wondering if tests can be done then    Can we leave a detailed message on this number? YES    Phone number patient can be reached at: Home number on file [phone] (home)    Best Time: any    Call taken on 1/24/2020 at 8:53 AM by Justine Doty

## 2020-01-27 DIAGNOSIS — E03.9 HYPOTHYROIDISM, UNSPECIFIED TYPE: ICD-10-CM

## 2020-01-27 RX ORDER — LEVOTHYROXINE SODIUM 50 UG/1
50 TABLET ORAL DAILY
Qty: 90 TABLET | Refills: 0 | Status: SHIPPED | OUTPATIENT
Start: 2020-01-27 | End: 2020-02-14

## 2020-01-27 NOTE — TELEPHONE ENCOUNTER
"SYNTHROID 50 MCG tablet     Last Written Prescription Date:  2/5/2019  Last Fill Quantity: 90,  # refills: 3   Last office visit: 1/4/2019 with prescribing provider:  Dr. Schmitt   Future Office Visit:   Next 5 appointments (look out 90 days)    Feb 14, 2020 10:30 AM CST  PHYSICAL [facility]  [facility] [address] [facility] [address] [phone]         Requested Prescriptions   Pending Prescriptions Disp Refills     SYNTHROID 50 MCG tablet [Pharmacy Med Name: SYNTHROID 0.05MG (50MCG)TABLETS] 90 tablet 3     Sig: TAKE 1 TABLET BY MOUTH ONCE DAILY       Thyroid Protocol Failed - 1/27/2020 10:14 AM        Failed - Recent (12 mo) or future (30 days) visit within the authorizing provider's specialty     Patient has had an office visit with the authorizing provider or a provider within the authorizing providers department within the previous 12 mos or has a future within next 30 days. See \"Patient Info\" tab in inbasket, or \"Choose Columns\" in Meds & Orders section of the refill encounter.              Failed - Normal TSH on file in past 12 months     Recent Labs   Lab Test 01/04/19  1200   TSH 1.96              Passed - Patient is 12 years or older        Passed - Medication is active on med list        Passed - No active pregnancy on record     If patient is pregnant or has had a positive pregnancy test, please check TSH.          Passed - No positive pregnancy test in past 12 months     If patient is pregnant or has had a positive pregnancy test, please check TSH.            "

## 2020-01-27 NOTE — TELEPHONE ENCOUNTER
Reason for Call:  Medication or medication refill:    Do you use a [facility]?  Name of the pharmacy and phone number for the current request:      [id number] - [place], [place] - [address] AT [facility] [address]    Name of the medication requested: Levothyroxine    Other request: Pt calling in to check on the status of med. She says that Dr. Schmitt is book out far but hopes with the appt with [facility] will be okay as she usually comes in and sees Oskar too.     Can we leave a detailed message on this number? YES    Phone number patient can be reached at: Cell number on file:    Telephone Information:   Mobile [phone]       Best Time: any    Call taken on 1/27/2020 at 8:53 AM by Gerson Rodriguez

## 2020-01-27 NOTE — TELEPHONE ENCOUNTER
Next 5 appointments (look out 90 days)    Feb 14, 2020 10:30 AM CST  PHYSICAL with Gonzales Ayoub MD  [facility] ([facility]) [address] [facility] [id number] [phone]        Routing refill request to provider for review/approval because:  Last Rx from Dr. Schmitt, pt asking if PCP will review/check thyroid labs at upcoming OV     Please advise (last visit with Dr. Dunaway >1 year ago)     Crissy HAWKINS RN

## 2020-01-29 RX ORDER — LEVOTHYROXINE SODIUM 50 MCG
TABLET ORAL
Qty: 30 TABLET | Refills: 0 | Status: SHIPPED | OUTPATIENT
Start: 2020-01-29 | End: 2020-02-14

## 2020-01-29 NOTE — TELEPHONE ENCOUNTER
A 30 day supply is given, patient is due for an office visit.  Patient has appointment scheduled with provider on 2/14/2020.  JUAN JOSÉ Wilson, RN  [facility]

## 2020-02-14 ENCOUNTER — OFFICE VISIT (OUTPATIENT)
Dept: FAMILY MEDICINE | Facility: CLINIC | Age: 80
End: 2020-02-14
Payer: MEDICARE

## 2020-02-14 ENCOUNTER — TELEPHONE (OUTPATIENT)
Dept: FAMILY MEDICINE | Facility: CLINIC | Age: 80
End: 2020-02-14

## 2020-02-14 VITALS
WEIGHT: 150 LBS | HEIGHT: 63 IN | TEMPERATURE: 97 F | DIASTOLIC BLOOD PRESSURE: 90 MMHG | HEART RATE: 96 BPM | BODY MASS INDEX: 26.58 KG/M2 | OXYGEN SATURATION: 97 % | SYSTOLIC BLOOD PRESSURE: 163 MMHG

## 2020-02-14 DIAGNOSIS — I10 BENIGN ESSENTIAL HYPERTENSION: ICD-10-CM

## 2020-02-14 DIAGNOSIS — Z00.00 ROUTINE GENERAL MEDICAL EXAMINATION AT A HEALTH CARE FACILITY: Primary | ICD-10-CM

## 2020-02-14 DIAGNOSIS — R35.1 NOCTURIA: ICD-10-CM

## 2020-02-14 DIAGNOSIS — E78.00 HYPERCHOLESTEREMIA: ICD-10-CM

## 2020-02-14 DIAGNOSIS — E03.9 HYPOTHYROIDISM, UNSPECIFIED TYPE: ICD-10-CM

## 2020-02-14 DIAGNOSIS — R53.1 SPELL OF GENERALIZED WEAKNESS: ICD-10-CM

## 2020-02-14 DIAGNOSIS — E55.9 VITAMIN D DEFICIENCY: ICD-10-CM

## 2020-02-14 DIAGNOSIS — Z00.00 ROUTINE GENERAL MEDICAL EXAMINATION AT A HEALTH CARE FACILITY: ICD-10-CM

## 2020-02-14 DIAGNOSIS — R74.01 ELEVATED ALT MEASUREMENT: ICD-10-CM

## 2020-02-14 LAB
ALBUMIN SERPL-MCNC: 4 G/DL (ref 3.4–5)
ALBUMIN UR-MCNC: ABNORMAL MG/DL
ALP SERPL-CCNC: 98 U/L (ref 40–150)
ALT SERPL W P-5'-P-CCNC: 33 U/L (ref 0–50)
ANION GAP SERPL CALCULATED.3IONS-SCNC: 9 MMOL/L (ref 3–14)
APPEARANCE UR: CLEAR
AST SERPL W P-5'-P-CCNC: 19 U/L (ref 0–45)
BACTERIA #/AREA URNS HPF: ABNORMAL /HPF
BILIRUB SERPL-MCNC: 0.6 MG/DL (ref 0.2–1.3)
BILIRUB UR QL STRIP: ABNORMAL
BUN SERPL-MCNC: 21 MG/DL (ref 7–30)
CALCIUM SERPL-MCNC: 9.7 MG/DL (ref 8.5–10.1)
CHLORIDE SERPL-SCNC: 104 MMOL/L (ref 94–109)
CHOLEST SERPL-MCNC: 235 MG/DL
CO2 SERPL-SCNC: 23 MMOL/L (ref 20–32)
COLOR UR AUTO: YELLOW
CREAT SERPL-MCNC: 0.86 MG/DL (ref 0.52–1.04)
CRP SERPL-MCNC: <2.9 MG/L (ref 0–8)
ERYTHROCYTE [DISTWIDTH] IN BLOOD BY AUTOMATED COUNT: 12.9 % (ref 10–15)
GFR SERPL CREATININE-BSD FRML MDRD: 64 ML/MIN/{1.73_M2}
GLUCOSE SERPL-MCNC: 95 MG/DL (ref 70–99)
GLUCOSE UR STRIP-MCNC: NEGATIVE MG/DL
HCT VFR BLD AUTO: 43.7 % (ref 35–47)
HDLC SERPL-MCNC: 66 MG/DL
HGB BLD-MCNC: 14.4 G/DL (ref 11.7–15.7)
HGB UR QL STRIP: NEGATIVE
KETONES UR STRIP-MCNC: ABNORMAL MG/DL
LDLC SERPL CALC-MCNC: 140 MG/DL
LEUKOCYTE ESTERASE UR QL STRIP: ABNORMAL
MCH RBC QN AUTO: 31.1 PG (ref 26.5–33)
MCHC RBC AUTO-ENTMCNC: 33 G/DL (ref 31.5–36.5)
MCV RBC AUTO: 94 FL (ref 78–100)
NITRATE UR QL: NEGATIVE
NONHDLC SERPL-MCNC: 169 MG/DL
PH UR STRIP: 5.5 PH (ref 5–7)
PLATELET # BLD AUTO: 269 10E9/L (ref 150–450)
POTASSIUM SERPL-SCNC: 4.4 MMOL/L (ref 3.4–5.3)
PROT SERPL-MCNC: 7.6 G/DL (ref 6.8–8.8)
RBC # BLD AUTO: 4.63 10E12/L (ref 3.8–5.2)
RBC #/AREA URNS AUTO: ABNORMAL /HPF
SODIUM SERPL-SCNC: 136 MMOL/L (ref 133–144)
SOURCE: ABNORMAL
SP GR UR STRIP: 1.02 (ref 1–1.03)
TRANS CELLS #/AREA URNS HPF: ABNORMAL /HPF
TRIGL SERPL-MCNC: 144 MG/DL
TSH SERPL DL<=0.005 MIU/L-ACNC: 1.43 MU/L (ref 0.4–4)
UROBILINOGEN UR STRIP-ACNC: 0.2 EU/DL (ref 0.2–1)
WBC # BLD AUTO: 6.2 10E9/L (ref 4–11)
WBC #/AREA URNS AUTO: ABNORMAL /HPF

## 2020-02-14 PROCEDURE — 85027 COMPLETE CBC AUTOMATED: CPT | Performed by: INTERNAL MEDICINE

## 2020-02-14 PROCEDURE — 80061 LIPID PANEL: CPT | Performed by: INTERNAL MEDICINE

## 2020-02-14 PROCEDURE — 81001 URINALYSIS AUTO W/SCOPE: CPT | Performed by: INTERNAL MEDICINE

## 2020-02-14 PROCEDURE — 87086 URINE CULTURE/COLONY COUNT: CPT | Performed by: INTERNAL MEDICINE

## 2020-02-14 PROCEDURE — 82306 VITAMIN D 25 HYDROXY: CPT | Performed by: INTERNAL MEDICINE

## 2020-02-14 PROCEDURE — 93000 ELECTROCARDIOGRAM COMPLETE: CPT | Performed by: INTERNAL MEDICINE

## 2020-02-14 PROCEDURE — 36415 COLL VENOUS BLD VENIPUNCTURE: CPT | Performed by: INTERNAL MEDICINE

## 2020-02-14 PROCEDURE — G0439 PPPS, SUBSEQ VISIT: HCPCS | Performed by: INTERNAL MEDICINE

## 2020-02-14 PROCEDURE — 86140 C-REACTIVE PROTEIN: CPT | Performed by: INTERNAL MEDICINE

## 2020-02-14 PROCEDURE — 80053 COMPREHEN METABOLIC PANEL: CPT | Performed by: INTERNAL MEDICINE

## 2020-02-14 PROCEDURE — 84443 ASSAY THYROID STIM HORMONE: CPT | Performed by: INTERNAL MEDICINE

## 2020-02-14 PROCEDURE — 99213 OFFICE O/P EST LOW 20 MIN: CPT | Mod: 25 | Performed by: INTERNAL MEDICINE

## 2020-02-14 RX ORDER — LOSARTAN POTASSIUM 50 MG/1
TABLET ORAL
Qty: 90 TABLET | Refills: 3 | Status: SHIPPED | OUTPATIENT
Start: 2020-02-14 | End: 2021-01-05

## 2020-02-14 RX ORDER — LEVOTHYROXINE SODIUM 50 MCG
50 TABLET ORAL DAILY
Qty: 90 TABLET | Refills: 3 | Status: SHIPPED | OUTPATIENT
Start: 2020-02-14 | End: 2021-04-06

## 2020-02-14 ASSESSMENT — ACTIVITIES OF DAILY LIVING (ADL): CURRENT_FUNCTION: NO ASSISTANCE NEEDED

## 2020-02-14 ASSESSMENT — MIFFLIN-ST. JEOR: SCORE: 1120.56

## 2020-02-14 NOTE — TELEPHONE ENCOUNTER
Reason for Call:  Other call back    Detailed comments: Patient has questions about Losartan did not want to get into details with me     Phone Number Patient can be reached at: Cell number on file:    Telephone Information:   Mobile [phone]       Best Time: any     Can we leave a detailed message on this number? YES    Call taken on 2/14/2020 at 12:12 PM by Ashleigh Hilario

## 2020-02-14 NOTE — TELEPHONE ENCOUNTER
"Patient called back     Wanted to ensure Synthroid was written ILEANA -Brand only    She was also confused between what generic and brand meant. Explained this to her multiple times - she states she's always taken \"Losartan\" not \"Cozaar\" - even though she said she only takes the brand name of things    She will plan to fill Synthroid and generic losartan.    Crissy HAWKINS RN      "

## 2020-02-14 NOTE — PROGRESS NOTES
SUBJECTIVE:   Arline Link is a 79 year old female who presents for Preventive Visit.    This is a very pleasant, very anxious female here for a physical.  She has a few issues as well.    She has not been checking her blood pressure.  She takes her medicine regularly.    She gets weak spells intermittently in the afternoon.  If she eats it does seem to get better.  No palpitations or chest pain or shortness of breath.  With the weak spells she checks her pulse and it is normal.    She has had nocturia for many years.  No burning or blood or discharge.  No vaginal burning or blood.  She also has had insomnia for about 9 years.    She otherwise feels fine.               Past Medical History:      Past Medical History:   Diagnosis Date     Benign essential hypertension 2016    started med by endocrine 11/16     Elevated ALT measurement 2015    fu nl 12/16     Hypercholesteremia      Hypothyroid 2012    Dr. Schmitt     Mucinosis of skin     Dr. Navas     Vitamin D deficiency              Past Surgical History:      Past Surgical History:   Procedure Laterality Date     APPENDECTOMY  14     TONSILLECTOMY & ADENOIDECTOMY  5             Social History:     Social History     Socioeconomic History     Marital status:      Spouse name: Not on file     Number of children: 4     Years of education: Not on file     Highest education level: Not on file   Occupational History     Not on file   Social Needs     Financial resource strain: Not on file     Food insecurity:     Worry: Not on file     Inability: Not on file     Transportation needs:     Medical: Not on file     Non-medical: Not on file   Tobacco Use     Smoking status: Never Smoker     Smokeless tobacco: Never Used   Substance and Sexual Activity     Alcohol use: No     Alcohol/week: 0.0 standard drinks     Drug use: No     Sexual activity: Not Currently     Partners: Male   Lifestyle     Physical activity:     Days per week: Not on file     Minutes per  "session: Not on file     Stress: Not on file   Relationships     Social connections:     Talks on phone: Not on file     Gets together: Not on file     Attends Congregation service: Not on file     Active member of club or organization: Not on file     Attends meetings of clubs or organizations: Not on file     Relationship status: Not on file     Intimate partner violence:     Fear of current or ex partner: Not on file     Emotionally abused: Not on file     Physically abused: Not on file     Forced sexual activity: Not on file   Other Topics Concern     Not on file   Social History Narrative     Not on file             Family History:   reviewed         Allergies:     Allergies   Allergen Reactions     Sulfa Drugs Unknown     Tetracycline Rash             Medications:     Current Outpatient Medications   Medication Sig Dispense Refill     acetaminophen (TYLENOL) 325 MG tablet Take 325-650 mg by mouth every 6 hours as needed for mild pain       losartan (COZAAR) 50 MG tablet TAKE 1 TABLET(50 MG) BY MOUTH DAILY 90 tablet 3     SYNTHROID 50 MCG tablet Take 1 tablet (50 mcg) by mouth daily 90 tablet 3     Vitamin D, Cholecalciferol, 1000 units TABS Take 2,000 Units by mouth daily                 Review of Systems:   The 10 point Review of Systems is negative other than noted in the HPI           Physical Exam:   Blood pressure (!) 163/90, pulse 96, temperature 97  F (36.1  C), temperature source Oral, height 1.594 m (5' 2.75\"), weight 68 kg (150 lb), SpO2 97 %, not currently breastfeeding.    Exam:  Constitutional: healthy appearing, alert and in no distress  Heent: Normocephalic. Head without obvious masses or lesions. PERRLDC, EOMI. Mouth exam within normal limits: tongue, mucous membranes, posterior pharynx all normal, no lesions or abnormalities seen.  Tm's and canals within normal limits bilaterally. Neck supple, no nuchal rigidity or masses. No supraclavicular, or cervical adenopathy. Thyroid symmetric, no " "masses.  Cardiovascular: Regular rate and rhythm, no murmer, rub or gallops.  JVP not elevated, no edema.  Carotids within normal limits bilaterally, no bruits.  Respiratory: Normal respiratory effort.  Lungs clear, normal flow, no wheezing or crackles.  Breasts: Normal bilaterally.  No masses or lesions.  Nipples within normal limits.  No axillary lesions or nodes.  My M.A. Was present during this part of the examination.  Gastrointestinal: Normal active bowel sounds.   Soft, not tender, no masses, guarding or rebound.  No hepatosplenomegaly.   Musculoskeletal: extremities normal, no gross deformities noted.  Skin: no suspicious lesions or rashes   Neurologic: Mental status within normal limits.  Speech fluent.  No gross motor abnormalities and gait intact.  Psychiatric: mentation appears normal and affect normal.         Data:   Labs sent; ekg - sinus tachy, within normal limits.        Assessment:   1. Normal complete physical exam  2. Weak spells, may be low sugar, doubt rhythm, ischemia, neuro event or low blood pressure  3. Hypertension, ?control, very nervous here, to check it and call if not normal  4. Nocturia, suspect due to insomnia, check urine  5. Insomnia, chronic  6. Elevated alt, follow up labs  7. Hypothyroidism, follow up labs  8. Low vit d, follow up labs  9. hcm         Plan:   Refuses immunizations  Monitor blood pressure  Letter with labs  Exercise, diet      Gonzales Ayoub M.D.              Are you in the first 12 months of your Medicare coverage?  No    Healthy Habits:    In general, how would you rate your overall health?  Good    Frequency of exercise:  None    Duration of exercise:  Less than 15 minutes    Do you usually eat at least 4 servings of fruit and vegetables a day, include whole grains    & fiber and avoid regularly eating high fat or \"junk\" foods?  Yes    Taking medications regularly:  Yes    Barriers to taking medications:  None    Medication side effects:  None    Ability to " successfully perform activities of daily living:  No assistance needed    Home Safety:  No safety concerns identified    Hearing Impairment:  No hearing concerns    In the past 6 months, have you been bothered by leaking of urine?  No    In general, how would you rate your overall mental or emotional health?  Good      PHQ-2 Total Score:    Additional concerns today:  No    Do you feel safe in your environment? Yes    Have you ever done Advance Care Planning? (For example, a Health Directive, POLST, or a discussion with a medical provider or your loved ones about your wishes): Yes, advance care planning is on file.      Fall risk       Cognitive Screening   1) Repeat 3 items (Leader, Season, Table)    2) Clock draw: NORMAL  3) 3 item recall: Recalls 3 objects  Results: 3 items recalled: COGNITIVE IMPAIRMENT LESS LIKELY    Mini-CogTM Copyright S Marino. Licensed by the author for use in [facility]; reprinted with permission (kay@[facility]). All rights reserved.      Do you have sleep apnea, excessive snoring or daytime drowsiness?: yes    Reviewed and updated as needed this visit by clinical staff         Reviewed and updated as needed this visit by Provider        Social History     Tobacco Use     Smoking status: Never Smoker     Smokeless tobacco: Never Used   Substance Use Topics     Alcohol use: No     Alcohol/week: 0.0 standard drinks     If you drink alcohol do you typically have >3 drinks per day or >7 drinks per week? No    Alcohol Use 12/16/2016   Prescreen: >3 drinks/day or >7 drinks/week? The patient does not drink >3 drinks per day nor >7 drinks per week.               Current providers sharing in care for this patient include:   Patient Care Team:  Gonzales Ayoub MD as PCP - General (Internal Medicine)  Gonzales Ayoub MD as Assigned PCP    The following health maintenance items are reviewed in Epic and correct as of today:  Health Maintenance   Topic Date Due     ROSA   "1940     ADVANCE CARE PLANNING  1940     DTAP/TDAP/TD IMMUNIZATION (1 - Tdap) 05/29/1951     ZOSTER IMMUNIZATION (1 of 2) 05/29/1990     PNEUMOCOCCAL IMMUNIZATION 65+ LOW/MEDIUM RISK (1 of 2 - PCV13) 05/29/2005     INFLUENZA VACCINE (1) 09/01/2019     PHQ-2  01/01/2020     MEDICARE ANNUAL WELLNESS VISIT  01/04/2020     FALL RISK ASSESSMENT  01/04/2020     LIPID  01/04/2024     IPV IMMUNIZATION  Aged Out     MENINGITIS IMMUNIZATION  Aged Out           Review of Systems      OBJECTIVE:   There were no vitals taken for this visit. Estimated body mass index is 28.05 kg/m  as calculated from the following:    Height as of 1/4/19: 1.594 m (5' 2.75\").    Weight as of 8/5/19: 71.3 kg (157 lb 1.6 oz).  Physical Exam          ASSESSMENT / PLAN:       COUNSELING:  Reviewed preventive health counseling, as reflected in patient instructions       Regular exercise       Healthy diet/nutrition    Estimated body mass index is 28.05 kg/m  as calculated from the following:    Height as of 1/4/19: 1.594 m (5' 2.75\").    Weight as of 8/5/19: 71.3 kg (157 lb 1.6 oz).         reports that she has never smoked. She has never used smokeless tobacco.      Appropriate preventive services were discussed with this patient, including applicable screening as appropriate for cardiovascular disease, diabetes, osteopenia/osteoporosis, and glaucoma.  As appropriate for age/gender, discussed screening for colorectal cancer, prostate cancer, breast cancer, and cervical cancer. Checklist reviewing preventive services available has been given to the patient.    Reviewed patients plan of care and provided an AVS. The Basic Care Plan (routine screening as documented in Health Maintenance) for Arline meets the Care Plan requirement. This Care Plan has been established and reviewed with the Patient.    Counseling Resources:  ATP IV Guidelines  Pooled Cohorts Equation Calculator  Breast Cancer Risk Calculator  FRAX Risk Assessment  ICSI Preventive " Guidelines  Dietary Guidelines for Americans, 2010  USDA's MyPlate  ASA Prophylaxis  Lung CA Screening    Gonzales Ayoub MD  [facility]    Identified Health Risks:

## 2020-02-14 NOTE — LETTER
[facility]  [address] [facility]  [address]  [place]  [address]  Tel: [phone]    February 17, 2020    Arline Link  [address] [facility] [address]        Dear Ms. Link,    It was a pleasure seeing you.  I wanted to get back to you with your test results.  I have enclosed a copy for your records.    For the most part your labs look super.  Your blood count, blood salts, kidney tests, liver tests, proteins, thyroid test and test of inflammation, the crp, are all normal.  Your cholesterol is a bit high and I would recommend lipitor for this to bring it down, but I suspect I know what you will say.  It does help to prevent heart attacks or stroke.  Let me know if I can prescribe this.    Your urine culture is negative so no infection.  Your ekg is also fine.    I am happy to bring you this overall excellent report.  If you have any questions please call me.    Sincerely,    Gonzales Ayoub MD/RC          Enclosure: Lab Results  Results for orders placed or performed in visit on 02/14/20   CBC with platelets     Status: None   Result Value Ref Range    WBC 6.2 4.0 - 11.0 10e9/L    RBC Count 4.63 3.8 - 5.2 10e12/L    Hemoglobin 14.4 11.7 - 15.7 g/dL    Hematocrit 43.7 35.0 - 47.0 %    MCV 94 78 - 100 fl    MCH 31.1 26.5 - 33.0 pg    MCHC 33.0 31.5 - 36.5 g/dL    RDW 12.9 10.0 - 15.0 %    Platelet Count 269 150 - 450 10e9/L   Comprehensive metabolic panel     Status: None   Result Value Ref Range    Sodium 136 133 - 144 mmol/L    Potassium 4.4 3.4 - 5.3 mmol/L    Chloride 104 94 - 109 mmol/L    Carbon Dioxide 23 20 - 32 mmol/L    Anion Gap 9 3 - 14 mmol/L    Glucose 95 70 - 99 mg/dL    Urea Nitrogen 21 7 - 30 mg/dL    Creatinine 0.86 0.52 - 1.04 mg/dL    GFR Estimate 64 >60 mL/min/[1.73_m2]    GFR Estimate If Black 75 >60 mL/min/[1.73_m2]    Calcium 9.7 8.5 - 10.1 mg/dL    Bilirubin Total 0.6 0.2 - 1.3 mg/dL    Albumin 4.0 3.4 - 5.0 g/dL    Protein Total 7.6 6.8 - 8.8 g/dL    Alkaline Phosphatase  98 40 - 150 U/L    ALT 33 0 - 50 U/L    AST 19 0 - 45 U/L   Lipid panel reflex to direct LDL Non-fasting     Status: Abnormal   Result Value Ref Range    Cholesterol 235 (H) <200 mg/dL    Triglycerides 144 <150 mg/dL    HDL Cholesterol 66 >49 mg/dL    LDL Cholesterol Calculated 140 (H) <100 mg/dL    Non HDL Cholesterol 169 (H) <130 mg/dL   Vitamin D Deficiency     Status: None   Result Value Ref Range    Vitamin D Deficiency screening 33 20 - 75 ug/L   TSH with free T4 reflex     Status: None   Result Value Ref Range    TSH 1.43 0.40 - 4.00 mU/L   *UA reflex to Microscopic     Status: Abnormal   Result Value Ref Range    Color Urine Yellow     Appearance Urine Clear     Glucose Urine Negative NEG^Negative mg/dL    Bilirubin Urine Small (A) NEG^Negative    Ketones Urine Trace (A) NEG^Negative mg/dL    Specific Gravity Urine 1.025 1.003 - 1.035    Blood Urine Negative NEG^Negative    pH Urine 5.5 5.0 - 7.0 pH    Protein Albumin Urine Trace (A) NEG^Negative mg/dL    Urobilinogen Urine 0.2 0.2 - 1.0 EU/dL    Nitrite Urine Negative NEG^Negative    Leukocyte Esterase Urine Small (A) NEG^Negative    Source Midstream Urine    CRP, inflammation     Status: None   Result Value Ref Range    CRP Inflammation <2.9 0.0 - 8.0 mg/L   Urine Microscopic     Status: Abnormal   Result Value Ref Range    WBC Urine 5-10 (A) OTO5^0 - 5 /HPF    RBC Urine 2-5 (A) OTO2^O - 2 /HPF    Transitional Epi Few FEW^Few /HPF    Bacteria Urine Few (A) NEG^Negative /HPF   Urine Culture Aerobic Bacterial     Status: None   Result Value Ref Range    Specimen Description Midstream Urine     Culture Micro       10,000 to 50,000 colonies/mL  mixed urogenital andrew  Susceptibility testing not routinely done

## 2020-02-14 NOTE — PATIENT INSTRUCTIONS
Start checking your blood pressure after sitting for 5 minutes.  Check it daily and let me know if it is not below 140/90.    Let me know if the weak spells persist.    Gonzales Ayoub M.D.

## 2020-02-15 LAB
BACTERIA SPEC CULT: NORMAL
SPECIMEN SOURCE: NORMAL

## 2020-02-16 LAB — DEPRECATED CALCIDIOL+CALCIFEROL SERPL-MC: 33 UG/L (ref 20–75)

## 2020-02-16 NOTE — RESULT ENCOUNTER NOTE
It was a pleasure seeing you.  I wanted to get back to you with your test results.  I have enclosed a copy for your records.    For the most part your labs look super.  Your blood count, blood salts, kidney tests, liver tests, proteins, thyroid test and test of inflammation, the crp, are all normal.  Your cholesterol is a bit high and I would recommend lipitor for this to bring it down, but I suspect I know what you will say.  It does help to prevent heart attacks or stroke.  Let me know if I can prescribe this.    Your urine culture is negative so no infection.  Your ekg is also fine.    I am happy to bring you this overall excellent report.  If you have any questions please call me.

## 2020-07-10 ENCOUNTER — TELEPHONE (OUTPATIENT)
Dept: FAMILY MEDICINE | Facility: CLINIC | Age: 80
End: 2020-07-10

## 2020-07-10 NOTE — TELEPHONE ENCOUNTER
Reason for Call:  Medication or medication refill:    Do you use a [facility]?  Name of the pharmacy and phone number for the current request:  [id number] - [place], [place] - [address] [place] [facility] [address]    Name of the medication requested:  [employer] atalth turmeric    Other request:  Pt has questions about taken this rx for arthritis sx ?     Can we leave a detailed message on this number? YES    Phone number patient can be reached at: Home number on file [phone] (home)    Best Time: any    Call taken on 7/10/2020 at 9:56 AM by Saleem Garay

## 2020-07-10 NOTE — TELEPHONE ENCOUNTER
"PCP,    Called pt  She is wondering if [employer] Turmeric is safe for her to take  \"Sounds really good for me\"  She mentioned that it has no yellow or red food coloring and is gluten and soy free     Please advise. She will  OTC if you approve.    Thank you,  Memo MOLINA RN  "

## 2021-01-04 DIAGNOSIS — I10 BENIGN ESSENTIAL HYPERTENSION: ICD-10-CM

## 2021-01-04 DIAGNOSIS — Z00.00 ROUTINE GENERAL MEDICAL EXAMINATION AT A HEALTH CARE FACILITY: ICD-10-CM

## 2021-01-05 RX ORDER — LOSARTAN POTASSIUM 50 MG/1
TABLET ORAL
Qty: 90 TABLET | Refills: 0 | Status: SHIPPED | OUTPATIENT
Start: 2021-01-05 | End: 2021-04-02

## 2021-01-05 NOTE — TELEPHONE ENCOUNTER
BP Readings from Last 3 Encounters:   02/14/20 (!) 163/90   08/05/19 (!) 158/90   05/20/19 180/89     Routing refill request to provider for review/approval because:  BP out of range:  Above 140/90

## 2021-04-02 DIAGNOSIS — I10 BENIGN ESSENTIAL HYPERTENSION: ICD-10-CM

## 2021-04-02 DIAGNOSIS — Z00.00 ROUTINE GENERAL MEDICAL EXAMINATION AT A HEALTH CARE FACILITY: ICD-10-CM

## 2021-04-02 RX ORDER — LOSARTAN POTASSIUM 50 MG/1
TABLET ORAL
Qty: 90 TABLET | Refills: 0 | Status: SHIPPED | OUTPATIENT
Start: 2021-04-02 | End: 2021-04-15

## 2021-04-02 NOTE — TELEPHONE ENCOUNTER
FYI- pt has a future appt scheduled 04/15/202.    Routing refill request to provider for review/approval because:  BP out of range:    Labs not current:  Creatinine and potassium         Creatinine   Date Value Ref Range Status   02/14/2020 0.86 0.52 - 1.04 mg/dL Final     Potassium   Date Value Ref Range Status   02/14/2020 4.4 3.4 - 5.3 mmol/L Final     BP Readings from Last 3 Encounters:   02/14/20 (!) 163/90   08/05/19 (!) 158/90   05/20/19 180/89

## 2021-04-02 NOTE — TELEPHONE ENCOUNTER
LOV 2- PG    Next 5 appointments (look out 90 days)    Apr 15, 2021 11:00 AM  PHYSICAL with Gonzales Ayoub MD  [facility] ([facility] ) [address] [facility] [address]  [phone]          Elizabeth Terrell RT (R)

## 2021-04-05 ENCOUNTER — TELEPHONE (OUTPATIENT)
Dept: FAMILY MEDICINE | Facility: CLINIC | Age: 81
End: 2021-04-05

## 2021-04-05 NOTE — TELEPHONE ENCOUNTER
Reason for Call:  Other appointment    Detailed comments: Pt has an upcoming klaudia't on 04.15.21 and daughter Arline would like to speak with PCP about concerns she has about her mother about her physical condition without her mother present.    Phone Number Patient's daughter Ashleigh can be reached at: [phone]    Best Time: any    Can we leave a detailed message on this number? YES    Call taken on 4/5/2021 at 3:38 PM by Justine Doty

## 2021-04-06 DIAGNOSIS — E03.9 HYPOTHYROIDISM, UNSPECIFIED TYPE: ICD-10-CM

## 2021-04-06 RX ORDER — LEVOTHYROXINE SODIUM 50 MCG
50 TABLET ORAL DAILY
Qty: 90 TABLET | Refills: 3 | Status: SHIPPED | OUTPATIENT
Start: 2021-04-06 | End: 2021-04-15

## 2021-04-06 NOTE — CONFIDENTIAL NOTE
"FYI PCP:     Returned call to Ashleigh (*No C2C on file*)     Requests that conversation be confidential, not shared with patient - wants PCP aware of this for upcoming appointment     States all patient's children are worried about her health. Is in lots of pain, can barely walk. Only takes Tylenol for pain. Every time she comes in patient goes back home and says \"I'm so healthy\" but family does not think she is as healthy as patient claims to be     Can barely get in/out of car - went to dentist, moans and groans    She needs him/respects him to go to another specialist. Unsure what going on. In pain all the time    Needs a walker - refuses     Ashleigh thinks [facility] needed, pt refuses that. Lives alone. Refuses Life Alert Bracelet, family tried to get home care nursing but pt is \"grouchy\" and \"chased out\" home care nurse    Respects PCP - wants to really assess legs/ daughter thinks she needs to get out of house and go somewhere where there is full time care but pt states she doesn't want to go to a nursing home     Could hear bones popping in hip getting her into and out of car - thinks PCP referred to Arrthritis specialist a while back, states she went to pain clinic she was referred to but refused shots     Ashleigh will be present at visit with her.     Also will be needing dental implants which pt is scared of     Daughter states pt is \"afraid of everything\"     No call back needed, this is just an FYI for PCP    Crissy HAWKINS RN    "

## 2021-04-06 NOTE — TELEPHONE ENCOUNTER
Routing refill request to provider for review/approval because:  Labs not current:    TSH   Date Value Ref Range Status   02/14/2020 1.43 0.40 - 4.00 mU/L Final       Patient needs to be seen because it has been more than 1 year since last office visit.        Rolly Pa, RN  [facility] Triage Nurse

## 2021-04-15 ENCOUNTER — OFFICE VISIT (OUTPATIENT)
Dept: FAMILY MEDICINE | Facility: CLINIC | Age: 81
End: 2021-04-15
Payer: MEDICARE

## 2021-04-15 ENCOUNTER — TELEPHONE (OUTPATIENT)
Dept: FAMILY MEDICINE | Facility: CLINIC | Age: 81
End: 2021-04-15

## 2021-04-15 VITALS
TEMPERATURE: 97 F | DIASTOLIC BLOOD PRESSURE: 86 MMHG | HEIGHT: 63 IN | HEART RATE: 90 BPM | OXYGEN SATURATION: 96 % | BODY MASS INDEX: 26.58 KG/M2 | SYSTOLIC BLOOD PRESSURE: 154 MMHG | WEIGHT: 150 LBS

## 2021-04-15 DIAGNOSIS — Z00.00 ROUTINE HISTORY AND PHYSICAL EXAMINATION OF ADULT: Primary | ICD-10-CM

## 2021-04-15 DIAGNOSIS — I10 BENIGN ESSENTIAL HYPERTENSION: ICD-10-CM

## 2021-04-15 DIAGNOSIS — M79.661 PAIN OF RIGHT LOWER LEG: ICD-10-CM

## 2021-04-15 DIAGNOSIS — E55.9 VITAMIN D DEFICIENCY: ICD-10-CM

## 2021-04-15 DIAGNOSIS — Z00.00 ROUTINE GENERAL MEDICAL EXAMINATION AT A HEALTH CARE FACILITY: ICD-10-CM

## 2021-04-15 DIAGNOSIS — E78.00 HYPERCHOLESTEREMIA: ICD-10-CM

## 2021-04-15 DIAGNOSIS — R74.01 ELEVATED ALT MEASUREMENT: ICD-10-CM

## 2021-04-15 DIAGNOSIS — E03.9 HYPOTHYROIDISM, UNSPECIFIED TYPE: ICD-10-CM

## 2021-04-15 LAB
ALBUMIN SERPL-MCNC: 4.2 G/DL (ref 3.4–5)
ALP SERPL-CCNC: 104 U/L (ref 40–150)
ALT SERPL W P-5'-P-CCNC: 34 U/L (ref 0–50)
ANION GAP SERPL CALCULATED.3IONS-SCNC: 3 MMOL/L (ref 3–14)
AST SERPL W P-5'-P-CCNC: 22 U/L (ref 0–45)
BILIRUB SERPL-MCNC: 0.8 MG/DL (ref 0.2–1.3)
BUN SERPL-MCNC: 19 MG/DL (ref 7–30)
CALCIUM SERPL-MCNC: 9.6 MG/DL (ref 8.5–10.1)
CHLORIDE SERPL-SCNC: 104 MMOL/L (ref 94–109)
CHOLEST SERPL-MCNC: 231 MG/DL
CO2 SERPL-SCNC: 29 MMOL/L (ref 20–32)
CREAT SERPL-MCNC: 1.04 MG/DL (ref 0.52–1.04)
ERYTHROCYTE [DISTWIDTH] IN BLOOD BY AUTOMATED COUNT: 13.5 % (ref 10–15)
GFR SERPL CREATININE-BSD FRML MDRD: 50 ML/MIN/{1.73_M2}
GLUCOSE SERPL-MCNC: 99 MG/DL (ref 70–99)
HCT VFR BLD AUTO: 43.4 % (ref 35–47)
HDLC SERPL-MCNC: 78 MG/DL
HGB BLD-MCNC: 14.6 G/DL (ref 11.7–15.7)
LDLC SERPL CALC-MCNC: 123 MG/DL
MCH RBC QN AUTO: 31.5 PG (ref 26.5–33)
MCHC RBC AUTO-ENTMCNC: 33.6 G/DL (ref 31.5–36.5)
MCV RBC AUTO: 94 FL (ref 78–100)
NONHDLC SERPL-MCNC: 153 MG/DL
PLATELET # BLD AUTO: 263 10E9/L (ref 150–450)
POTASSIUM SERPL-SCNC: 4.5 MMOL/L (ref 3.4–5.3)
PROT SERPL-MCNC: 7.8 G/DL (ref 6.8–8.8)
RBC # BLD AUTO: 4.64 10E12/L (ref 3.8–5.2)
SODIUM SERPL-SCNC: 136 MMOL/L (ref 133–144)
TRIGL SERPL-MCNC: 150 MG/DL
TSH SERPL DL<=0.005 MIU/L-ACNC: 3.11 MU/L (ref 0.4–4)
WBC # BLD AUTO: 5.5 10E9/L (ref 4–11)

## 2021-04-15 PROCEDURE — 85027 COMPLETE CBC AUTOMATED: CPT | Performed by: INTERNAL MEDICINE

## 2021-04-15 PROCEDURE — 80053 COMPREHEN METABOLIC PANEL: CPT | Performed by: INTERNAL MEDICINE

## 2021-04-15 PROCEDURE — 36415 COLL VENOUS BLD VENIPUNCTURE: CPT | Performed by: INTERNAL MEDICINE

## 2021-04-15 PROCEDURE — G0439 PPPS, SUBSEQ VISIT: HCPCS | Performed by: INTERNAL MEDICINE

## 2021-04-15 PROCEDURE — 82306 VITAMIN D 25 HYDROXY: CPT | Performed by: INTERNAL MEDICINE

## 2021-04-15 PROCEDURE — 99213 OFFICE O/P EST LOW 20 MIN: CPT | Mod: 25 | Performed by: INTERNAL MEDICINE

## 2021-04-15 PROCEDURE — 80061 LIPID PANEL: CPT | Performed by: INTERNAL MEDICINE

## 2021-04-15 PROCEDURE — 84443 ASSAY THYROID STIM HORMONE: CPT | Performed by: INTERNAL MEDICINE

## 2021-04-15 RX ORDER — LEVOTHYROXINE SODIUM 50 MCG
50 TABLET ORAL DAILY
Qty: 90 TABLET | Refills: 3 | Status: SHIPPED | OUTPATIENT
Start: 2021-04-15 | End: 2022-06-30

## 2021-04-15 RX ORDER — LOSARTAN POTASSIUM 50 MG/1
TABLET ORAL
Qty: 90 TABLET | Refills: 3 | Status: SHIPPED | OUTPATIENT
Start: 2021-04-15 | End: 2022-06-30

## 2021-04-15 ASSESSMENT — ACTIVITIES OF DAILY LIVING (ADL): CURRENT_FUNCTION: NO ASSISTANCE NEEDED

## 2021-04-15 ASSESSMENT — MIFFLIN-ST. JEOR: SCORE: 1111.59

## 2021-04-15 NOTE — LETTER
April 16, 2021      Arline Ricoar  [address] [facility] [address]        Dear ,    It was a pleasure seeing you.  I wanted to get back to you with your test results.  I have enclosed a copy for your records.     Your labs look very good.  This includes your thyroid test, chemistries(liver tests) and cholesterol.  The total cholesterol is high but the hdl is super so the ratio is just fine.     If you have any questions please call me.     Dr. Ayoub    Resulted Orders   CBC with platelets   Result Value Ref Range    WBC 5.5 4.0 - 11.0 10e9/L    RBC Count 4.64 3.8 - 5.2 10e12/L    Hemoglobin 14.6 11.7 - 15.7 g/dL    Hematocrit 43.4 35.0 - 47.0 %    MCV 94 78 - 100 fl    MCH 31.5 26.5 - 33.0 pg    MCHC 33.6 31.5 - 36.5 g/dL    RDW 13.5 10.0 - 15.0 %    Platelet Count 263 150 - 450 10e9/L   Comprehensive metabolic panel   Result Value Ref Range    Sodium 136 133 - 144 mmol/L    Potassium 4.5 3.4 - 5.3 mmol/L    Chloride 104 94 - 109 mmol/L    Carbon Dioxide 29 20 - 32 mmol/L    Anion Gap 3 3 - 14 mmol/L    Glucose 99 70 - 99 mg/dL    Urea Nitrogen 19 7 - 30 mg/dL    Creatinine 1.04 0.52 - 1.04 mg/dL    GFR Estimate 50 (L) >60 mL/min/[1.73_m2]      Comment:      Non  GFR Calc  Starting 12/18/2018, serum creatinine based estimated GFR (eGFR) will be   calculated using the Chronic Kidney Disease Epidemiology Collaboration   (CKD-EPI) equation.      GFR Estimate If Black 58 (L) >60 mL/min/[1.73_m2]      Comment:       GFR Calc  Starting 12/18/2018, serum creatinine based estimated GFR (eGFR) will be   calculated using the Chronic Kidney Disease Epidemiology Collaboration   (CKD-EPI) equation.      Calcium 9.6 8.5 - 10.1 mg/dL    Bilirubin Total 0.8 0.2 - 1.3 mg/dL    Albumin 4.2 3.4 - 5.0 g/dL    Protein Total 7.8 6.8 - 8.8 g/dL    Alkaline Phosphatase 104 40 - 150 U/L    ALT 34 0 - 50 U/L    AST 22 0 - 45 U/L   Lipid panel reflex to direct LDL Non-fasting   Result Value  Ref Range    Cholesterol 231 (H) <200 mg/dL      Comment:      Desirable:       <200 mg/dl    Triglycerides 150 (H) <150 mg/dL      Comment:      Borderline high:  150-199 mg/dl  High:             200-499 mg/dl  Very high:       >499 mg/dl      HDL Cholesterol 78 >49 mg/dL    LDL Cholesterol Calculated 123 (H) <100 mg/dL      Comment:      Above desirable:  100-129 mg/dl  Borderline High:  130-159 mg/dL  High:             160-189 mg/dL  Very high:       >189 mg/dl      Non HDL Cholesterol 153 (H) <130 mg/dL      Comment:      Above Desirable:  130-159 mg/dl  Borderline high:  160-189 mg/dl  High:             190-219 mg/dl  Very high:       >219 mg/dl     TSH with free T4 reflex   Result Value Ref Range    TSH 3.11 0.40 - 4.00 mU/L

## 2021-04-15 NOTE — PATIENT INSTRUCTIONS
For the hip and leg pain I would see Dr. Fantasma Mi.  He is with [facility] here in [place].    Gonzales Ayoub M.D.

## 2021-04-15 NOTE — TELEPHONE ENCOUNTER
Reason for Call:  Home Health Care    Jamila with [facility] called regarding (reason for call):     Orders are needed for this patient.   Orders to delay care until 4/24th. They are at capacity right now and cannot see the pt until then  PT:     OT:     Skilled Nursing:     Pt Provider: Anitra    Phone Number Homecare Nurse can be reached at: [phone] option 1    Can we leave a detailed message on this number?     Phone number patient can be reached at:     Best Time: 8am-4:30pm    Call taken on 4/15/2021 at 4:37 PM by Piyush Nesbitt

## 2021-04-16 LAB — DEPRECATED CALCIDIOL+CALCIFEROL SERPL-MC: 29 UG/L (ref 20–75)

## 2021-04-16 NOTE — TELEPHONE ENCOUNTER
I approve of requested home care orders.    Gonzales Ayoub MD  Done    Thank you    Gonzales Ayoub M.D.

## 2021-04-16 NOTE — RESULT ENCOUNTER NOTE
It was a pleasure seeing you.  I wanted to get back to you with your test results.  I have enclosed a copy for your records.    Your labs look very good.  This includes your thyroid test, chemistries(liver tests) and cholesterol.  The total cholesterol is high but the hdl is super so the ratio is just fine.    If you have any questions please call me.

## 2021-04-19 ENCOUNTER — TELEPHONE (OUTPATIENT)
Dept: FAMILY MEDICINE | Facility: CLINIC | Age: 81
End: 2021-04-19

## 2021-04-19 NOTE — TELEPHONE ENCOUNTER
Dr. Ayoub,  Patient's daughter, Arline states that Patient refused PT and home health care. Pt had a fall on 04/17. An ambulance was called. Pt did not go to the hospital.  Arline is worried about patient's safety. She is not sure what steps to take next.

## 2021-04-22 ENCOUNTER — DOCUMENTATION ONLY (OUTPATIENT)
Dept: CARE COORDINATION | Facility: CLINIC | Age: 81
End: 2021-04-22

## 2021-04-22 NOTE — PROGRESS NOTES
Dear Dr. Ayoub  S: We are notifying you that Arline Link; MRN [id number]  Has declined home care assessment.    B: [facility] received a referral for home care assessment.    A: Respectfully declines Home Care Assessment.      R: Please follow up with patient as appropriate and if the patient's needs change, please submit a new home care referral order through [facility].  Thank you for the referral  Sincerely [facility]  Telma Sampson  [phone]

## 2021-04-23 NOTE — TELEPHONE ENCOUNTER
Called Homecare intake with providers response. Message left with receptions. Asked that nurse call clinic back if further questions.

## 2021-05-10 ENCOUNTER — TELEPHONE (OUTPATIENT)
Dept: FAMILY MEDICINE | Facility: CLINIC | Age: 81
End: 2021-05-10

## 2021-05-10 DIAGNOSIS — B35.1 ONYCHOMYCOSIS: Primary | ICD-10-CM

## 2021-05-10 NOTE — TELEPHONE ENCOUNTER
Spoke with Ashleigh and relayed message regarding referral. Also gave her the Podiatry/Ortho scheduling # if she does not hear from them in 1-2 business days.    Swapna May MA

## 2021-05-10 NOTE — TELEPHONE ENCOUNTER
Pt's daughter Ashleigh calling and would like a referral to a podiatrist for toenail fungus. Please place referral if appropriate. Please call Ashleigh with the referral information at [phone]. or Arline at [phone]. Thank you.   Yamilet Cruz,

## 2021-05-28 ENCOUNTER — HOSPITAL ENCOUNTER (OUTPATIENT)
Facility: CLINIC | Age: 81
End: 2021-05-28
Attending: ORTHOPAEDIC SURGERY | Admitting: ORTHOPAEDIC SURGERY
Payer: MEDICARE

## 2021-05-31 DIAGNOSIS — Z11.59 ENCOUNTER FOR SCREENING FOR OTHER VIRAL DISEASES: ICD-10-CM

## 2021-06-28 ENCOUNTER — NURSE TRIAGE (OUTPATIENT)
Dept: FAMILY MEDICINE | Facility: CLINIC | Age: 81
End: 2021-06-28

## 2021-06-28 NOTE — TELEPHONE ENCOUNTER
Called patient.  Patient agrees and will schedule with ENT.     Sumaya CAN, RN      June 28, 2021  2:28 PM

## 2021-06-28 NOTE — TELEPHONE ENCOUNTER
Pt called re: Plugged ears x 2 months, and asking for advice.    Denies all other symptoms/nasal symptoms/pain/fever/etc.   Advised: Office Visit so someone can look in ears, most likely need ear wash.  Pt declined stating difficulty getting ride into clinic.    Pt asking if anything she can try first.  Triage protocol does allow for some OTC advice such as antihistamines or afrin x 3 days, however due to age and triage protocol advising visit in 3 days, routing for PCP advice.     Please advise if anything pt can try OTC first?       Call back:   [phone], ok to leave a detailed message.   Reason for Disposition    Ear congestion    Ear congestion present > 48 hours    Additional Information    Negative: Ear pain is the main symptom    Negative: Hearing loss (complete or partial) is the main symptom    Negative: Earwax is the main concern    Negative: Has nasal allergies and they are acting up    Negative: Earache lasts > 1 hour    Negative: Pus or cloudy discharge from ear canal    Negative: Patient wants to be seen    Protocols used: EAR - CONGESTION-A-OH

## 2021-11-08 ENCOUNTER — TELEPHONE (OUTPATIENT)
Dept: FAMILY MEDICINE | Facility: CLINIC | Age: 81
End: 2021-11-08
Payer: MEDICARE

## 2021-11-08 NOTE — TELEPHONE ENCOUNTER
Patient calling with multiple concerns. Patient states that she has not gotten the COVID 19 vaccination yet. She is very anxious about getting it. She is concerned about the possible side effects.    Patient states that she has wheezing that comes and goes. She thinks that it could be a side effect of losartan. States that she has not been sick. She states that she gets a runny nose now and then from the losartan.    Patient states that she needs a hip replacement. She is very concerned to go under anesthesia. Due to hip pain she has been using a walker. States that it takes her awhile to get to the bathroom and she is needing to use Poise pads for incontinence.    LOV  Was 4/15/21. States that after that appointment she got lost her sense of taste. She was never tested vor COVID 19.    Patient scheduled telephone visit with Dr. Ayoub to discuss above concerns.  Natalee Arango RN

## 2021-11-11 NOTE — PROGRESS NOTES
Arline is a 81 year old who is being evaluated via a billable telephone visit.      What phone number would you like to be contacted at? [phone]  How would you like to obtain your AVS? Mail a copy    She has not gotten the covid shot, does not go out and is not around people.  Is very nervous about getting it.      She feels as if the losartan makes her go to the bathroom to urinate after she takes it and due to her hip djd it is harder to get to toilet.    She has djd of her hip and may need jt replacement.    ASSESSMENT:  1. djd of hip  2. Hypertension  3. Urine freq, ?due to med  4. Need for covid shot    PLAN:  I rec trying a different blood pressure medicine but she does not want to do that and she will let me know if things worsen.    I recommend that she see Dr. Ron Henderson for her hip replacement.    We have again discussed the Covid shot and I strongly recommended.    Gonzales Ayoub M.D.  11 minutes on the day of the encounter doing chart review, history and exam, documentation and further activities as noted above.

## 2021-11-12 ENCOUNTER — VIRTUAL VISIT (OUTPATIENT)
Dept: FAMILY MEDICINE | Facility: CLINIC | Age: 81
End: 2021-11-12
Payer: MEDICARE

## 2021-11-12 DIAGNOSIS — M16.11 PRIMARY OSTEOARTHRITIS OF RIGHT HIP: ICD-10-CM

## 2021-11-12 DIAGNOSIS — I10 BENIGN ESSENTIAL HYPERTENSION: Primary | ICD-10-CM

## 2021-11-12 PROCEDURE — 99442 PR PHYSICIAN TELEPHONE EVALUATION 11-20 MIN: CPT | Mod: 95 | Performed by: INTERNAL MEDICINE

## 2021-11-12 RX ORDER — MULTIPLE VITAMINS W/ MINERALS TAB 9MG-400MCG
1 TAB ORAL DAILY
COMMUNITY

## 2022-06-28 DIAGNOSIS — I10 BENIGN ESSENTIAL HYPERTENSION: ICD-10-CM

## 2022-06-28 DIAGNOSIS — Z00.00 ROUTINE GENERAL MEDICAL EXAMINATION AT A HEALTH CARE FACILITY: ICD-10-CM

## 2022-06-28 DIAGNOSIS — E03.9 HYPOTHYROIDISM, UNSPECIFIED TYPE: ICD-10-CM

## 2022-06-30 RX ORDER — LEVOTHYROXINE SODIUM 50 MCG
TABLET ORAL
Qty: 90 TABLET | Refills: 0 | Status: SHIPPED | OUTPATIENT
Start: 2022-06-30 | End: 2022-09-29

## 2022-06-30 RX ORDER — LOSARTAN POTASSIUM 50 MG/1
TABLET ORAL
Qty: 90 TABLET | Refills: 0 | Status: SHIPPED | OUTPATIENT
Start: 2022-06-30 | End: 2022-09-29

## 2022-06-30 NOTE — TELEPHONE ENCOUNTER
Patient needs appointment , spoke to patient and she will call in one week to schedule her appointment.     She needs to arrange for transportation.     Shaylee Benavides RN  -[facility]

## 2022-09-26 DIAGNOSIS — I10 BENIGN ESSENTIAL HYPERTENSION: ICD-10-CM

## 2022-09-26 DIAGNOSIS — Z00.00 ROUTINE GENERAL MEDICAL EXAMINATION AT A HEALTH CARE FACILITY: ICD-10-CM

## 2022-09-26 DIAGNOSIS — E03.9 HYPOTHYROIDISM, UNSPECIFIED TYPE: ICD-10-CM

## 2022-09-29 ENCOUNTER — TELEPHONE (OUTPATIENT)
Dept: FAMILY MEDICINE | Facility: CLINIC | Age: 82
End: 2022-09-29

## 2022-09-29 RX ORDER — LOSARTAN POTASSIUM 50 MG/1
TABLET ORAL
Qty: 30 TABLET | Refills: 0 | Status: SHIPPED | OUTPATIENT
Start: 2022-09-29 | End: 2022-09-29

## 2022-09-29 RX ORDER — LEVOTHYROXINE SODIUM 50 MCG
TABLET ORAL
Qty: 30 TABLET | Refills: 0 | Status: SHIPPED | OUTPATIENT
Start: 2022-09-29 | End: 2022-10-27

## 2022-09-29 NOTE — TELEPHONE ENCOUNTER
"Pt was called and advised to schedule an appt. States she can't come in since she does not have transportation. Triage advised VV. Pt declines states \"tell him not to worry about it\". \" I will see him before the end of the year\". \"As long he keeps approving refills\". She informed labs are needed and BP to ensure she is getting the appropriate medication dose. Pt plans to call clinic back for appt. 30 day refill Rx pended please advice on approval of lexii refill.      Routing refill request to provider for review/approval because:  BP out of range:   Labs not current:    Creatinine   Date Value Ref Range Status   04/15/2021 1.04 0.52 - 1.04 mg/dL Final     BP Readings from Last 3 Encounters:   04/15/21 (!) 154/86   02/14/20 (!) 163/90   08/05/19 (!) 158/90     TSH   Date Value Ref Range Status   04/15/2021 3.11 0.40 - 4.00 mU/L Final       "

## 2022-09-29 NOTE — TELEPHONE ENCOUNTER
Patient calling stating PCP refused to refill her scripts for losartan and synthroid. See refill encounter on 9/29/22.     Per chart review, both rx's sent to patient's pharmacy on 9/29/22.    Patient will follow up with pharmacy.    Khurram Arambula RN  [facility]

## 2022-10-14 ENCOUNTER — TELEPHONE (OUTPATIENT)
Dept: FAMILY MEDICINE | Facility: CLINIC | Age: 82
End: 2022-10-14

## 2022-10-14 NOTE — TELEPHONE ENCOUNTER
Patient called to get appt for Annual and there is no available appts.      Please call patient back at [phone]

## 2022-10-26 ENCOUNTER — TELEPHONE (OUTPATIENT)
Dept: FAMILY MEDICINE | Facility: CLINIC | Age: 82
End: 2022-10-26

## 2022-10-26 DIAGNOSIS — E03.9 HYPOTHYROIDISM, UNSPECIFIED TYPE: ICD-10-CM

## 2022-10-26 DIAGNOSIS — Z00.00 ROUTINE GENERAL MEDICAL EXAMINATION AT A HEALTH CARE FACILITY: Primary | ICD-10-CM

## 2022-10-26 NOTE — TELEPHONE ENCOUNTER
Patient Returning Call    Reason for call:  Pt's [facility] Frank is calling. No C2C on file.  Pt is in need of a wellness exam but due to patient's health she can not leave the house. Frank is asking if Dr. Ayoub has any recommendations as to who can come into patient's home and do a wellness exam    Information relayed to patient:      Patient has additional questions:  No    What are your questions/concerns:      Okay to leave a detailed message?: No at   Home number on file [phone] (home)= patient's home phone number

## 2022-10-27 RX ORDER — LEVOTHYROXINE SODIUM 50 MCG
TABLET ORAL
Qty: 30 TABLET | Refills: 0 | Status: SHIPPED | OUTPATIENT
Start: 2022-10-27 | End: 2022-11-08

## 2022-10-27 NOTE — TELEPHONE ENCOUNTER
Medication is being filled for 1 time refill only due to:  Patient needs labs overdue. Patient is scheduled in 1 week with Dr. Ayoub. Natalee Arango RN

## 2022-10-31 NOTE — TELEPHONE ENCOUNTER
Routing to CC to assist.   Referral signed.     Rolly Pa RN  [facility]

## 2022-11-01 ENCOUNTER — PATIENT OUTREACH (OUTPATIENT)
Dept: CARE COORDINATION | Facility: CLINIC | Age: 82
End: 2022-11-01

## 2022-11-01 NOTE — LETTER
[facility] CARE COORDINATION  [address]  [place] [address]      November 1, 2022    Arline Link  [address]  [place] [address]      Dear Arline,        I am a clinic community health worker who works with Gonzales Ayoub MD with the [facility]. I wanted to thank you for spending the time to talk with me.  Below is a description of clinic care coordination and how I can further assist you.       The clinic care coordination team is made up of a registered nurse, , financial resource worker and community health worker who understand the health care system. The goal of clinic care coordination is to help you manage your health and improve access to the health care system. Our team works alongside your provider to assist you in determining your health and social needs. We can help you obtain health care and community resources, providing you with necessary information and education. We can work with you through any barriers and develop a care plan that helps coordinate and strengthen the communication between you and your care team.    Please feel free to contact me with any questions or concerns regarding care coordination and what we can offer.      We are focused on providing you with the highest-quality healthcare experience possible.    Sincerely,     Nury Giles, [place]  Clinic Care Coordination  [facility]: [place], [place], [place], and [facility]  Phone: [phone]

## 2022-11-01 NOTE — PROGRESS NOTES
Clinic Care Coordination Contact  Community Health Worker Initial Outreach    CHW Initial Information Gathering:  Referral Source: PCP    Reason for Referral:  Patient/Caregiver Support   Resources for Transportation   Resources for Support     CHW introduced self, intent of call, and offered the CC program.    Patient stated she has a lot of support from her family including transportation.  Patient stated she is not interested in any resources at this time.    Patient accepts CC: No, Not interested. Patient will be sent Care Coordination introduction letter for future reference.     Plan: Care Coordinator will send care coordination introduction letter with care coordinator contact information and explanation of care coordination services via mail.     Care Coordinator will do no further outreaches at this time.    Nury Giles, [place]  Clinic Care Coordination  [facility]: [facility] [place], [place], [place], and [facility]  Phone: [phone]

## 2022-11-08 ENCOUNTER — VIRTUAL VISIT (OUTPATIENT)
Dept: FAMILY MEDICINE | Facility: CLINIC | Age: 82
End: 2022-11-08
Payer: MEDICARE

## 2022-11-08 DIAGNOSIS — E78.00 HYPERCHOLESTEREMIA: ICD-10-CM

## 2022-11-08 DIAGNOSIS — E55.9 VITAMIN D DEFICIENCY: ICD-10-CM

## 2022-11-08 DIAGNOSIS — R74.01 ELEVATED ALT MEASUREMENT: ICD-10-CM

## 2022-11-08 DIAGNOSIS — E03.9 HYPOTHYROIDISM, UNSPECIFIED TYPE: ICD-10-CM

## 2022-11-08 DIAGNOSIS — M16.11 PRIMARY OSTEOARTHRITIS OF RIGHT HIP: ICD-10-CM

## 2022-11-08 DIAGNOSIS — I10 BENIGN ESSENTIAL HYPERTENSION: Primary | ICD-10-CM

## 2022-11-08 PROCEDURE — 99442 PR PHYSICIAN TELEPHONE EVALUATION 11-20 MIN: CPT | Mod: 95 | Performed by: INTERNAL MEDICINE

## 2022-11-08 RX ORDER — LEVOTHYROXINE SODIUM 50 MCG
TABLET ORAL
Qty: 90 TABLET | Refills: 3 | Status: SHIPPED | OUTPATIENT
Start: 2022-11-08 | End: 2022-11-22

## 2022-11-08 RX ORDER — LOSARTAN POTASSIUM 50 MG/1
50 TABLET ORAL DAILY
Qty: 90 TABLET | Refills: 3 | Status: SHIPPED | OUTPATIENT
Start: 2022-11-08 | End: 2024-01-22

## 2022-11-08 NOTE — PROGRESS NOTES
Arline is a 82 year old who is being evaluated via a billable telephone visit.      What phone number would you like to be contacted at? [phone]  How would you like to obtain your AVS? Mail a copy        Subjective   Arline is a 82 year old, presenting for the following health issues:    This is a phone visit with this pleasant 82 y.o. patient.    She uses a walker due to her hip issue.  She feels great.  She does not drive but has help with getting grocery and laundry and cleaning.  She denies chest pain or shortness of breath, no falls.  No gi c/o.  Is not able to get out due to her hip pain.  But she has no complaints at this time and feels quite well.    Past Medical History:   Diagnosis Date     Benign essential hypertension 2016    started med by endocrine 11/16     Elevated ALT measurement 2015    fu nl 12/16     Hypercholesteremia      Hypothyroid 2012    Dr. Schmitt     Mucinosis of skin     Dr. Navas     Vitamin D deficiency      Past Surgical History:   Procedure Laterality Date     APPENDECTOMY  14     TONSILLECTOMY & ADENOIDECTOMY  5     Social History     Socioeconomic History     Marital status:      Spouse name: Not on file     Number of children: 4     Years of education: Not on file     Highest education level: Not on file   Occupational History     Not on file   Tobacco Use     Smoking status: Never     Smokeless tobacco: Never   Substance and Sexual Activity     Alcohol use: No     Alcohol/week: 0.0 standard drinks     Drug use: No     Sexual activity: Not Currently     Partners: Male   Other Topics Concern     Not on file   Social History Narrative     Not on file     Social Determinants of Health     Financial Resource Strain: Not on file   Food Insecurity: Not on file   Transportation Needs: Not on file   Physical Activity: Not on file   Stress: Not on file   Social Connections: Not on file   Intimate Partner Violence: Not on file   Housing Stability: Not on file     Current  Outpatient Medications   Medication Sig Dispense Refill     acetaminophen (TYLENOL) 325 MG tablet Take 325-650 mg by mouth every 6 hours as needed for mild pain       losartan (COZAAR) 50 MG tablet Take 1 tablet (50 mg) by mouth daily 90 tablet 3     multivitamin w/minerals (THERA-VIT-M) tablet Take 1 tablet by mouth daily       SYNTHROID 50 MCG tablet TAKE 1 TABLET(50 MCG) BY MOUTH DAILY Strength: 50 mcg 90 tablet 3     Vitamin D, Cholecalciferol, 1000 units TABS Take 2,000 Units by mouth daily       Allergies   Allergen Reactions     Sulfa Drugs Unknown     Tetracycline Rash     FAMILY HISTORY NOTED AND REVIEWED    REVIEW OF SYSTEMS: above      ASSESSMENT:  1. Hypertension, ?control  2. Elevated cholesterol  3. oa of hip, does not want surgery  4. Low vit d  5. Hypothyroidism  6. Elevated alt    PLAN:  Home care eval and follow up blood pressure and labs  Call if changes  Does not want vaccines.    Gonzales Ayoub M.D.  11 minutes on the day of the encounter doing chart review, history and exam, documentation and further activities as noted above.

## 2022-11-09 ENCOUNTER — TELEPHONE (OUTPATIENT)
Dept: FAMILY MEDICINE | Facility: CLINIC | Age: 82
End: 2022-11-09

## 2022-11-09 NOTE — TELEPHONE ENCOUNTER
Patient called in extremely upset that her grand daughter called and is worried that her home care was cancelled informed nothing was canceled  Patient wanting to know when homecare will be out to see her informed that homecare is not part of [facility] so she will need to call them to see when they have her scheduled    Number provided  for accent care    Ruben Díaz RN

## 2022-11-09 NOTE — TELEPHONE ENCOUNTER
Pt's granddaughter Frank (no C2C on file) called    Trying to schedule a VV for patient to get home care set up    Advised her we cannot disclose any information or schedule patient without her authorization     Was unable to disclose to her that pt just had a VV and home care referral was placed already     Frank's number: [phone]    Advised her we'd need to get verbal authorization from patient to speak with granddaughter before we can share info or schedule pt     Called patient - declined to give authorization to speak with Frank. Advised then triage won't call granddaughter back     Closing encounter     Crissy HAWKINS, Triage RN  [facility] Internal Medicine [facility]

## 2022-11-10 ENCOUNTER — TELEPHONE (OUTPATIENT)
Dept: FAMILY MEDICINE | Facility: CLINIC | Age: 82
End: 2022-11-10

## 2022-11-10 ENCOUNTER — MEDICAL CORRESPONDENCE (OUTPATIENT)
Dept: HEALTH INFORMATION MANAGEMENT | Facility: CLINIC | Age: 82
End: 2022-11-10

## 2022-11-10 NOTE — TELEPHONE ENCOUNTER
The Home Care/Assisted Living/Nursing Facility is calling regarding an established patient.  Has the patient seen Home Care in the past or is currently residing in Assisted Living or Nursing Facility? No.     Hilda PHAN calling from [facility] requesting the following orders that are NOT within the Home Care, Assisted Living or Nursing Home Eval and Treatment standing order and must be ordered by a Licensed Practitioner.    Preferred Call Back Number: [phone], OK to leave detailed message.    Skilled nursing 2 X 3 weeks, 1 X 6 weeks,   Pt and OT to eval and treat  Requesting order for lab orders for 11/14: CBC with Platelets, lipids, TSH, T4, Vitamin D     Routing to Licensed Practitioner (Provider) to review request and provide approval or recommendation.    Writer has verified Requestor will send fax to have orders signed.  Natalee Arango RN

## 2022-11-11 ENCOUNTER — PATIENT OUTREACH (OUTPATIENT)
Dept: CARE COORDINATION | Facility: CLINIC | Age: 82
End: 2022-11-11

## 2022-11-11 NOTE — PROGRESS NOTES
Clinic Care Coordination Contact  Care Team Conversations    11-11, CHW:    CHW Nury received a VM from Pt with concerns surrounding HHC home visit. Upon chart review, it can be seen that Pt Care Team was able to direct Pt on calling HHC directly to arrange appointment. Writer reached out to ensure Pt was able to get connected.     Pt shared that she was able to arrange everything with [facility] and feels very well taken care of.     Writer briefly reintroduced Care Coordination and encouraged Pt to reach out with any questions or if home safety becomes an issue. Writer gave Nury CHW P. Number over the phone for Pt to write down.     CC will do no further outreaches at this time.     ANTWON Mcmillan. Public Health  Community Health Worker  [facility]  Clinic Care Coordination  [phone]

## 2022-11-18 ENCOUNTER — TELEPHONE (OUTPATIENT)
Dept: FAMILY MEDICINE | Facility: CLINIC | Age: 82
End: 2022-11-18

## 2022-11-18 NOTE — TELEPHONE ENCOUNTER
CASTILLO Gaming from [facility] called requesting approval for to draw labs on Tuesday 11/22. Triage gave verbal approval to draw labs Tuesday routing message to provider for review. No further action needed unless provider wants labs drawn before.

## 2022-11-21 DIAGNOSIS — E03.9 HYPOTHYROIDISM, UNSPECIFIED TYPE: ICD-10-CM

## 2022-11-22 ENCOUNTER — LAB REQUISITION (OUTPATIENT)
Dept: LAB | Facility: CLINIC | Age: 82
End: 2022-11-22
Payer: MEDICARE

## 2022-11-22 DIAGNOSIS — E55.9 VITAMIN D DEFICIENCY, UNSPECIFIED: ICD-10-CM

## 2022-11-22 DIAGNOSIS — E03.9 HYPOTHYROIDISM, UNSPECIFIED: ICD-10-CM

## 2022-11-22 DIAGNOSIS — E78.00 PURE HYPERCHOLESTEROLEMIA, UNSPECIFIED: ICD-10-CM

## 2022-11-22 LAB
ALBUMIN SERPL-MCNC: 3.9 G/DL (ref 3.4–5)
ALP SERPL-CCNC: 119 U/L (ref 40–150)
ALT SERPL W P-5'-P-CCNC: 40 U/L (ref 0–50)
ANION GAP SERPL CALCULATED.3IONS-SCNC: 9 MMOL/L (ref 3–14)
AST SERPL W P-5'-P-CCNC: 34 U/L (ref 0–45)
BILIRUB SERPL-MCNC: 1.1 MG/DL (ref 0.2–1.3)
BUN SERPL-MCNC: 14 MG/DL (ref 7–30)
CALCIUM SERPL-MCNC: 9.9 MG/DL (ref 8.5–10.1)
CHLORIDE BLD-SCNC: 104 MMOL/L (ref 94–109)
CHOLEST SERPL-MCNC: 215 MG/DL
CO2 SERPL-SCNC: 24 MMOL/L (ref 20–32)
CREAT SERPL-MCNC: 1.04 MG/DL (ref 0.52–1.04)
DEPRECATED CALCIDIOL+CALCIFEROL SERPL-MC: 25 UG/L (ref 20–75)
ERYTHROCYTE [DISTWIDTH] IN BLOOD BY AUTOMATED COUNT: 13.2 % (ref 10–15)
FASTING STATUS PATIENT QL REPORTED: ABNORMAL
GFR SERPL CREATININE-BSD FRML MDRD: 53 ML/MIN/1.73M2
GLUCOSE BLD-MCNC: 104 MG/DL (ref 70–99)
HCT VFR BLD AUTO: 46.3 % (ref 35–47)
HDLC SERPL-MCNC: 65 MG/DL
HGB BLD-MCNC: 15.3 G/DL (ref 11.7–15.7)
HOLD SPECIMEN: NORMAL
LDLC SERPL CALC-MCNC: 123 MG/DL
MCH RBC QN AUTO: 30.8 PG (ref 26.5–33)
MCHC RBC AUTO-ENTMCNC: 33 G/DL (ref 31.5–36.5)
MCV RBC AUTO: 93 FL (ref 78–100)
NONHDLC SERPL-MCNC: 150 MG/DL
PLATELET # BLD AUTO: 259 10E3/UL (ref 150–450)
POTASSIUM BLD-SCNC: 4.5 MMOL/L (ref 3.4–5.3)
PROT SERPL-MCNC: 7.9 G/DL (ref 6.8–8.8)
RBC # BLD AUTO: 4.97 10E6/UL (ref 3.8–5.2)
SODIUM SERPL-SCNC: 137 MMOL/L (ref 133–144)
TRIGL SERPL-MCNC: 137 MG/DL
WBC # BLD AUTO: 5.3 10E3/UL (ref 4–11)

## 2022-11-22 PROCEDURE — 82306 VITAMIN D 25 HYDROXY: CPT | Mod: ORL | Performed by: INTERNAL MEDICINE

## 2022-11-22 PROCEDURE — 80053 COMPREHEN METABOLIC PANEL: CPT | Mod: ORL | Performed by: INTERNAL MEDICINE

## 2022-11-22 PROCEDURE — 85027 COMPLETE CBC AUTOMATED: CPT | Mod: ORL | Performed by: INTERNAL MEDICINE

## 2022-11-22 PROCEDURE — 80061 LIPID PANEL: CPT | Mod: ORL | Performed by: INTERNAL MEDICINE

## 2022-11-22 RX ORDER — LEVOTHYROXINE SODIUM 50 MCG
TABLET ORAL
Qty: 90 TABLET | Refills: 3 | Status: SHIPPED | OUTPATIENT
Start: 2022-11-08 | End: 2024-01-22

## 2022-11-22 NOTE — TELEPHONE ENCOUNTER
Per Rx sent by provider previously, Prescription approved per [facility] Refill Protocol.        Crissy HAWKINS, Triage RN  [facility] Internal Medicine [facility]

## 2022-11-23 ENCOUNTER — MEDICAL CORRESPONDENCE (OUTPATIENT)
Dept: HEALTH INFORMATION MANAGEMENT | Facility: CLINIC | Age: 82
End: 2022-11-23

## 2022-11-27 DIAGNOSIS — Z53.9 DIAGNOSIS NOT YET DEFINED: Primary | ICD-10-CM

## 2022-11-27 PROCEDURE — G0180 MD CERTIFICATION HHA PATIENT: HCPCS | Performed by: INTERNAL MEDICINE

## 2022-11-30 ENCOUNTER — TELEPHONE (OUTPATIENT)
Dept: FAMILY MEDICINE | Facility: CLINIC | Age: 82
End: 2022-11-30

## 2022-11-30 NOTE — TELEPHONE ENCOUNTER
Homecare Orders Requested:     POWER Charles at Phone Number [phone] from [facility] called to request orders for the following Services:    Physical Therapy for:Continued PT once e/o week x 4 wks     Verbal okay given    Homecare agency to fax orders over for review and signature of PCP.    Coco Maciel RN

## 2022-12-06 ENCOUNTER — MEDICAL CORRESPONDENCE (OUTPATIENT)
Dept: HEALTH INFORMATION MANAGEMENT | Facility: CLINIC | Age: 82
End: 2022-12-06

## 2022-12-09 ENCOUNTER — VIRTUAL VISIT (OUTPATIENT)
Dept: FAMILY MEDICINE | Facility: CLINIC | Age: 82
End: 2022-12-09
Payer: MEDICARE

## 2022-12-09 DIAGNOSIS — I10 BENIGN ESSENTIAL HYPERTENSION: Primary | ICD-10-CM

## 2022-12-09 DIAGNOSIS — M16.11 PRIMARY OSTEOARTHRITIS OF RIGHT HIP: ICD-10-CM

## 2022-12-09 DIAGNOSIS — E78.00 HYPERCHOLESTEREMIA: ICD-10-CM

## 2022-12-09 PROCEDURE — 99213 OFFICE O/P EST LOW 20 MIN: CPT | Mod: 95 | Performed by: INTERNAL MEDICINE

## 2022-12-09 NOTE — PATIENT INSTRUCTIONS

## 2022-12-09 NOTE — PROGRESS NOTES
Arline is a 82 year old who is being evaluated via a billable video visit.      How would you like to obtain your AVS? Mail a copy  If the video visit is dropped, the invitation should be resent by: Text to cell phone: [phone]  Will anyone else be joining your video visit? No              Subjective   Arline is a 82 year old, presenting for the following health issues:  No chief complaint on file.    This is a video visit with the patient and her granddaughter.  Overall he is doing quite well but does have significant hip issues which precludes her from getting out so she is homebound.  She has having home care come out and doing therapy which is wonderful.  She is really feeling quite well.  She had recent labs as noted and reviewed but unfortunately a TSH was not done.    Vitals:  No vitals were obtained today due to virtual visit.    Physical Exam   GENERAL: Healthy, alert and no distress  EYES: Eyes grossly normal to inspection.  No discharge or erythema, or obvious scleral/conjunctival abnormalities.  RESP: No audible wheeze, cough, or visible cyanosis.  No visible retractions or increased work of breathing.    SKIN: Visible skin clear. No significant rash, abnormal pigmentation or lesions.  NEURO: Cranial nerves grossly intact.  Mentation and speech appropriate for age.  PSYCH: Mentation appears normal, affect normal/bright, judgement and insight intact, normal speech and appearance well-groomed.    ASSESSMENT:  1. Hypertension, on meds  2. oa of hip  3. Elevated cholesterol    PLAN:  Home care  Home physical therapy  Follow up in person and can do repeat labs then    Gonzales Ayoub M.D.              Video-Visit Details    Video Start Time: 2:00 PM    Type of service:  Video Visit    Video End Time:2:11 PM    Originating Location (pt. Location): Home        Distant Location (provider location):  On-site    Platform used for Video Visit: Daniel

## 2022-12-14 ENCOUNTER — MEDICAL CORRESPONDENCE (OUTPATIENT)
Dept: HEALTH INFORMATION MANAGEMENT | Facility: CLINIC | Age: 82
End: 2022-12-14

## 2022-12-22 ENCOUNTER — MEDICAL CORRESPONDENCE (OUTPATIENT)
Dept: HEALTH INFORMATION MANAGEMENT | Facility: CLINIC | Age: 82
End: 2022-12-22

## 2022-12-29 ENCOUNTER — MEDICAL CORRESPONDENCE (OUTPATIENT)
Dept: HEALTH INFORMATION MANAGEMENT | Facility: CLINIC | Age: 82
End: 2022-12-29

## 2023-01-08 ENCOUNTER — MEDICAL CORRESPONDENCE (OUTPATIENT)
Dept: HEALTH INFORMATION MANAGEMENT | Facility: CLINIC | Age: 83
End: 2023-01-08

## 2023-11-01 ENCOUNTER — TELEPHONE (OUTPATIENT)
Dept: FAMILY MEDICINE | Facility: CLINIC | Age: 83
End: 2023-11-01
Payer: MEDICARE

## 2023-11-01 NOTE — TELEPHONE ENCOUNTER
"Daughter Ashleigh calling with concerns for her mothers health. She states that her mom is in \"great denial of her own health issues\". Daughter states that her moms legs are blue/purple and they have been for years but seem to be getting worse. Daughter states that pt was supposed to have hip surgery 4 years ago but denied this surgery as she felt \"she was going to die\".     Pt currently using walker to get around but has had frequent falls where EMS has had to come to the home and help the pt up. Pt has refused being taken to the hospital each time. Daughter states that the pt spends about 20 hours a day in bed.    Another concern the daughter has is she feels like the patient has dementia that seems to be getting worse. She sates the patient sees a man running through her back yard and hears this man walking on her roof. Ashleigh tries to reassure her mother but does not seem to help at all.     Pt daughter states that \"she will not take medications except the occasional tylenol\"    Daughter states that the patient also has a very poor diet and only eats \"M&M's and chocolate chip muffins\" She also states that the patient has lost most of her teeth and will not go to a dentist. Daughter thinks the patient may have an abscess around her teeth.    Pt daughter Ashleigh requesting help or advice and would the patient to be placed in an assisted living or have home care. Pt does not want to come into the clinic. Ashleigh states that her mom requested a doctor come to her home.    No CTC with daughter. Ashleigh states that her mom asked her to call and would like to be looped in on a 3 way call if possible with a plan.    Daughter also states \"when calling my mom don't mention what I said about her legs or her dementia or she will get upset\" \"I don't want to blow this chance to get my mom help\"     Joanne Roman RN        "

## 2023-11-02 NOTE — TELEPHONE ENCOUNTER
"Patient Contact    Attempt # 1    Was call answered?  Yes.  Pt \"feels great and looks great. I'm not going to waste my time\".     Pt reporting last in home eval 12/2022. \"She hasn't changed since I saw him last\".     Attempted to call pt's daughter, Ashleigh per request. Pt verbalized approval to discuss scheduling with Ashleigh. Pt update her daughter with appointment time.     Pt agrees to virtual visit, refusing in clinic appointment at this time.       "

## 2023-11-17 ENCOUNTER — VIRTUAL VISIT (OUTPATIENT)
Dept: FAMILY MEDICINE | Facility: CLINIC | Age: 83
End: 2023-11-17
Payer: MEDICARE

## 2023-11-17 DIAGNOSIS — M16.11 PRIMARY OSTEOARTHRITIS OF RIGHT HIP: Primary | ICD-10-CM

## 2023-11-17 PROCEDURE — 99207 PR NON-BILLABLE SERV PER CHARTING: CPT | Mod: 95 | Performed by: INTERNAL MEDICINE

## 2023-11-17 NOTE — PROGRESS NOTES
Arline is a 83 year old who is being evaluated via a billable video visit.      How would you like to obtain your AVS? Mail a copy  If the video visit is dropped, the invitation should be resent by: Text to cell phone: [phone]  Will anyone else be joining your video visit? No      This is a phone visit, she is at home and I am onsite.  She was unable to do the video today.  Ashleigh is on the phone, her and Ashleigh were arguing much of the time.  The patient denies things.  I have not seen her for 2 years and explained I need to see her.  She does note hip issues, but denies pain, using a walker, does not want surgery.  See note of 11/1/23.    The patient denies blue extremities.  She tells me she will make follow up office visit.        Gonzales Ayoub M.D.  7 minutes on the day of the encounter doing chart review, history and exam, documentation and further activities as noted above.

## 2023-12-27 ENCOUNTER — TELEPHONE (OUTPATIENT)
Dept: FAMILY MEDICINE | Facility: CLINIC | Age: 83
End: 2023-12-27
Payer: MEDICARE

## 2023-12-27 NOTE — TELEPHONE ENCOUNTER
Pt's daughter wants someone to go to pt's home and trim pt's nails.  Daughter thought pt had this service on the past. Triage unable to share pt's information with daughter. No C2C on file. Triage advised  dtr if pt is home bound, she needs updated F2F visit otherwise, discuss appropriate orders at wellness visit.     Daughter agreed to call clinic if pt needs to schedule F2F visit prior to wellness visit.

## 2024-01-22 ENCOUNTER — OFFICE VISIT (OUTPATIENT)
Dept: FAMILY MEDICINE | Facility: CLINIC | Age: 84
End: 2024-01-22
Payer: MEDICARE

## 2024-01-22 ENCOUNTER — TELEPHONE (OUTPATIENT)
Dept: FAMILY MEDICINE | Facility: CLINIC | Age: 84
End: 2024-01-22

## 2024-01-22 VITALS
OXYGEN SATURATION: 93 % | TEMPERATURE: 97 F | RESPIRATION RATE: 18 BRPM | BODY MASS INDEX: 26.58 KG/M2 | SYSTOLIC BLOOD PRESSURE: 131 MMHG | HEIGHT: 63 IN | DIASTOLIC BLOOD PRESSURE: 82 MMHG | WEIGHT: 150 LBS | HEART RATE: 119 BPM

## 2024-01-22 DIAGNOSIS — E78.00 HYPERCHOLESTEREMIA: ICD-10-CM

## 2024-01-22 DIAGNOSIS — I10 BENIGN ESSENTIAL HYPERTENSION: ICD-10-CM

## 2024-01-22 DIAGNOSIS — R79.89 ELEVATED SERUM CREATININE: ICD-10-CM

## 2024-01-22 DIAGNOSIS — L98.9 SKIN LESION: ICD-10-CM

## 2024-01-22 DIAGNOSIS — Z74.09 MOBILITY IMPAIRED: ICD-10-CM

## 2024-01-22 DIAGNOSIS — E83.52 HYPERCALCEMIA: ICD-10-CM

## 2024-01-22 DIAGNOSIS — R74.01 ELEVATED ALT MEASUREMENT: Primary | ICD-10-CM

## 2024-01-22 DIAGNOSIS — R29.898 RIGHT LEG WEAKNESS: ICD-10-CM

## 2024-01-22 DIAGNOSIS — E03.9 HYPOTHYROIDISM, UNSPECIFIED TYPE: ICD-10-CM

## 2024-01-22 DIAGNOSIS — E55.9 VITAMIN D DEFICIENCY: ICD-10-CM

## 2024-01-22 LAB
ALBUMIN SERPL BCG-MCNC: 4.6 G/DL (ref 3.5–5.2)
ALP SERPL-CCNC: 94 U/L (ref 40–150)
ALT SERPL W P-5'-P-CCNC: 23 U/L (ref 0–50)
ANION GAP SERPL CALCULATED.3IONS-SCNC: 16 MMOL/L (ref 7–15)
AST SERPL W P-5'-P-CCNC: 32 U/L (ref 0–45)
BILIRUB SERPL-MCNC: 0.8 MG/DL
BUN SERPL-MCNC: 14.8 MG/DL (ref 8–23)
CALCIUM SERPL-MCNC: 10.7 MG/DL (ref 8.8–10.2)
CHLORIDE SERPL-SCNC: 105 MMOL/L (ref 98–107)
CREAT SERPL-MCNC: 1.34 MG/DL (ref 0.51–0.95)
DEPRECATED HCO3 PLAS-SCNC: 22 MMOL/L (ref 22–29)
EGFRCR SERPLBLD CKD-EPI 2021: 39 ML/MIN/1.73M2
ERYTHROCYTE [DISTWIDTH] IN BLOOD BY AUTOMATED COUNT: 12.7 % (ref 10–15)
GLUCOSE SERPL-MCNC: 116 MG/DL (ref 70–99)
HCT VFR BLD AUTO: 49.1 % (ref 35–47)
HGB BLD-MCNC: 15.7 G/DL (ref 11.7–15.7)
MCH RBC QN AUTO: 30.5 PG (ref 26.5–33)
MCHC RBC AUTO-ENTMCNC: 32 G/DL (ref 31.5–36.5)
MCV RBC AUTO: 95 FL (ref 78–100)
PLATELET # BLD AUTO: 269 10E3/UL (ref 150–450)
POTASSIUM SERPL-SCNC: 4.5 MMOL/L (ref 3.4–5.3)
PROT SERPL-MCNC: 7.7 G/DL (ref 6.4–8.3)
RBC # BLD AUTO: 5.15 10E6/UL (ref 3.8–5.2)
SODIUM SERPL-SCNC: 143 MMOL/L (ref 135–145)
TSH SERPL DL<=0.005 MIU/L-ACNC: 3.53 UIU/ML (ref 0.3–4.2)
VIT D+METAB SERPL-MCNC: 23 NG/ML (ref 20–50)
WBC # BLD AUTO: 7.4 10E3/UL (ref 4–11)

## 2024-01-22 PROCEDURE — 85027 COMPLETE CBC AUTOMATED: CPT | Performed by: INTERNAL MEDICINE

## 2024-01-22 PROCEDURE — 80053 COMPREHEN METABOLIC PANEL: CPT | Performed by: INTERNAL MEDICINE

## 2024-01-22 PROCEDURE — 36415 COLL VENOUS BLD VENIPUNCTURE: CPT | Performed by: INTERNAL MEDICINE

## 2024-01-22 PROCEDURE — 82306 VITAMIN D 25 HYDROXY: CPT | Performed by: INTERNAL MEDICINE

## 2024-01-22 PROCEDURE — 99214 OFFICE O/P EST MOD 30 MIN: CPT | Performed by: INTERNAL MEDICINE

## 2024-01-22 PROCEDURE — 84443 ASSAY THYROID STIM HORMONE: CPT | Performed by: INTERNAL MEDICINE

## 2024-01-22 RX ORDER — LEVOTHYROXINE SODIUM 50 MCG
TABLET ORAL
Qty: 90 TABLET | Refills: 3 | Status: SHIPPED | OUTPATIENT
Start: 2024-01-22

## 2024-01-22 RX ORDER — LOSARTAN POTASSIUM 50 MG/1
50 TABLET ORAL DAILY
Qty: 90 TABLET | Refills: 3 | Status: SHIPPED | OUTPATIENT
Start: 2024-01-22

## 2024-01-22 ASSESSMENT — PAIN SCALES - GENERAL: PAINLEVEL: NO PAIN (0)

## 2024-01-22 NOTE — TELEPHONE ENCOUNTER
Daughter reports that the patient is stubborn and in huge denial. No C2C on file for the daughter.    Daughter states that the patient has a temper and she is afraid to bring up this questions at the patient's appointment today at 12:40 with Dr. Ayoub.       1) Patient's legs - bluish and cold.     2)  Infection and toe nails? Big toe nail looked like pus underneath it.     3)  back itching and looks like bed sore    4)  teeth are all falling out. Patient has poor diet of Vince Dylan sausage, cupcakes and M&M's.     5) At times patient hallucinates and daughter has recordings of patient doing this.     Daughter is hoping these concerns can be addressed at appointment today. Patient has not been to the clinic in 2 years.     Natalee Arango RN

## 2024-01-22 NOTE — LETTER
January 23, 2024      Arline Link  [address] [facility] [address]        Dear ,    We are writing to inform you of your test results.    It was very nice to see you.  I wanted to get back to you with your test results.     Your TSH test indicates a normal thyroid level so I think we should continue the same dose.  Your complete blood count and vitamin D level are also fine.     Your chemistry panel shows a normal sodium and potassium level.  The creatinine which is a kidney test is just slightly higher at this time.  You do not have kidney issues but I will want to keep an eye on this.  Your calcium level is also slightly high which can go up and down for a variety of reasons.  Again I am not worried but I want to keep an eye on this.  Your sugar is just slightly high but not a problem and your liver tests are normal.     To be safe I would like to do a repeat blood test in 1 month.  You do not need to fast or see me for this but please be sure to get it done.  You can call my office to schedule it.     Please let me know if you have questions.     Gonzales Ayoub M.D.     Resulted Orders   CBC with platelets   Result Value Ref Range    WBC Count 7.4 4.0 - 11.0 10e3/uL    RBC Count 5.15 3.80 - 5.20 10e6/uL    Hemoglobin 15.7 11.7 - 15.7 g/dL    Hematocrit 49.1 (H) 35.0 - 47.0 %    MCV 95 78 - 100 fL    MCH 30.5 26.5 - 33.0 pg    MCHC 32.0 31.5 - 36.5 g/dL    RDW 12.7 10.0 - 15.0 %    Platelet Count 269 150 - 450 10e3/uL   Comprehensive metabolic panel   Result Value Ref Range    Sodium 143 135 - 145 mmol/L      Comment:      Reference intervals for this test were updated on 09/26/2023 to more accurately reflect our healthy population. There may be differences in the flagging of prior results with similar values performed with this method. Interpretation of those prior results can be made in the context of the updated reference intervals.     Potassium 4.5 3.4 - 5.3 mmol/L    Carbon Dioxide (CO2) 22  22 - 29 mmol/L    Anion Gap 16 (H) 7 - 15 mmol/L    Urea Nitrogen 14.8 8.0 - 23.0 mg/dL    Creatinine 1.34 (H) 0.51 - 0.95 mg/dL    GFR Estimate 39 (L) >60 mL/min/1.73m2    Calcium 10.7 (H) 8.8 - 10.2 mg/dL    Chloride 105 98 - 107 mmol/L    Glucose 116 (H) 70 - 99 mg/dL    Alkaline Phosphatase 94 40 - 150 U/L      Comment:      Reference intervals for this test were updated on 11/14/2023 to more accurately reflect our healthy population. There may be differences in the flagging of prior results with similar values performed with this method. Interpretation of those prior results can be made in the context of the updated reference intervals.    AST 32 0 - 45 U/L      Comment:      Reference intervals for this test were updated on 6/12/2023 to more accurately reflect our healthy population. There may be differences in the flagging of prior results with similar values performed with this method. Interpretation of those prior results can be made in the context of the updated reference intervals.    ALT 23 0 - 50 U/L      Comment:      Reference intervals for this test were updated on 6/12/2023 to more accurately reflect our healthy population. There may be differences in the flagging of prior results with similar values performed with this method. Interpretation of those prior results can be made in the context of the updated reference intervals.      Protein Total 7.7 6.4 - 8.3 g/dL    Albumin 4.6 3.5 - 5.2 g/dL    Bilirubin Total 0.8 <=1.2 mg/dL   TSH with free T4 reflex   Result Value Ref Range    TSH 3.53 0.30 - 4.20 uIU/mL   Vitamin D Deficiency   Result Value Ref Range    Vitamin D, Total (25-Hydroxy) 23 20 - 50 ng/mL      Comment:      optimum levels    Narrative    Season, race, dietary intake, and treatment affect the concentration of 25-hydroxy-Vitamin D. Values may decrease during winter months and increase during summer months.    Vitamin D determination is routinely performed by an immunoassay specific for  25 hydroxyvitamin D3.  If an individual is on vitamin D2(ergocalciferol) supplementation, please specify 25 OH vitamin D2 and D3 level determination by LCMSMS test VITD23.         If you have any questions or concerns, please call the clinic at the number listed above.       Sincerely,      Gonzales Ayoub MD/kw

## 2024-01-22 NOTE — PROGRESS NOTES
This is an 83-year-old who is here with her daughter Ashleigh for follow-up.    It tends to be a difficult evaluation as the patient can be quite sarcastic and does not seem to like answering questions very well.  She also has very fixed thoughts.  When I speak with her she really seems to minimize things.  She presents in a wheelchair because she does not have her walker.  When we talk about home care other people coming to her home she seems quite paranoid about this.    Multiple concerns today including loss of her teeth for some time.  The patient feels that is convinced is her thyroid medication.  She has not been to a dentist for some time.    She also has decreased mobility due to right leg issues of 10 years duration.  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  She does not really have significant pain there but she does have weakness and it can give out on her.  She therefore uses a walker all the time.  In speaking with Ashleigh she had a fall in July and then 1 a couple months later but not since then.  It is unclear the cause of the fall but it does not sound like it was syncope.    The patient's legs and toes are blue all the time.    Her diet does not sound very good.    The daughter also notes that at times the patient can hallucinate.    The patient herself really denies most things.  A complete review of systems is negative except for the gait difficulty and the teeth loss according to the patient.  She would not allow us to weigh her today but feels it has been stable.    The patient is single and has kids and lives alone independently.  She apparently was able to get around using her walker at home.    Past Medical History:   Diagnosis Date    Benign essential hypertension 2016    started med by endocrine 11/16    Elevated ALT measurement 2015    fu nl 12/16    Hypercholesteremia     Hypothyroid 2012    Dr. Schmitt    Mucinosis of skin     Dr. Navas    Vitamin D deficiency   "    Past Surgical History:   Procedure Laterality Date    APPENDECTOMY  14    TONSILLECTOMY & ADENOIDECTOMY  5     Social History     Socioeconomic History    Marital status:      Spouse name: Not on file    Number of children: 4    Years of education: Not on file    Highest education level: Not on file   Occupational History    Not on file   Tobacco Use    Smoking status: Never    Smokeless tobacco: Never   Substance and Sexual Activity    Alcohol use: No     Alcohol/week: 0.0 standard drinks of alcohol    Drug use: No    Sexual activity: Not Currently     Partners: Male   Other Topics Concern    Not on file   Social History Narrative    Not on file     Social Determinants of Health     Financial Resource Strain: Not on file   Food Insecurity: Not on file   Transportation Needs: Not on file   Physical Activity: Not on file   Stress: Not on file   Social Connections: Not on file   Interpersonal Safety: Not on file   Housing Stability: Not on file     Current Outpatient Medications   Medication Sig Dispense Refill    acetaminophen (TYLENOL) 325 MG tablet Take 325-650 mg by mouth every 6 hours as needed for mild pain      losartan (COZAAR) 50 MG tablet Take 1 tablet (50 mg) by mouth daily 90 tablet 3    multivitamin w/minerals (THERA-VIT-M) tablet Take 1 tablet by mouth daily      SYNTHROID 50 MCG tablet TAKE 1 TABLET(50 MCG) BY MOUTH DAILY 90 tablet 3    Vitamin D, Cholecalciferol, 1000 units TABS Take 2,000 Units by mouth daily       Allergies   Allergen Reactions    Sulfa Antibiotics Unknown    Tetracycline Rash     FAMILY HISTORY NOTED AND REVIEWED    REVIEW OF SYSTEMS: above    PHYSICAL EXAM    /82 (BP Location: Right arm, Patient Position: Sitting, Cuff Size: Adult Regular)   Pulse 119   Temp 97  F (36.1  C) (Oral)   Resp 18   Ht 1.588 m (5' 2.5\")   Wt 68 kg (150 lb)   SpO2 93%   BMI 27.00 kg/m      Patient appears non toxic, can be somewhat hard to direct at times  The patient is alert and " comfortsty arguing with me and her daughter often but often it is jokingly done.  She has significant amount of loss of teeth with dark teeth and poor dentition.  She is alert and oriented and with prompting does know the date and it took her a bit but knows the president and knew the name Rolo Williamson and that he used to be the president.  She knew my name and her daughter's name.  I examined the patient undressed with her daughter in the room.  Initially she is in a wheelchair but then with the help of my assistant we were able to move her to the table.  I could not look at her buttocks as she was unable to stand.  Skin exam otherwise revealed 1 reddish scaly lesion on the left upper back.  She does have poor care of her lower legs and toes with yellow toes and enlarged nails and some scaling with coolness and decreased color of her legs bilaterally.  It is hard to feel her distal pulses.  There is no open sores.  No supraclavicular, cervical or axillary lymphadenopathy  Eyes within normal limits  TMs and canals with some wax otherwise clear  Lungs clear normal flow  Cardiovascular regular rhythm no murmur rub or gallop no JVP or edema  Abdomen soft, nontender, no masses guarding or rebound, no hepatosplenomegaly.  Neurologically she is alert speech is fluent and she knows with some prompting the date, president and place.  Her right leg does appear weak to flex her hip but she is very guarded and does not let me do much.  She moves all her other extremities well.    Labs sent    ASSESSMENT:  Hypertension, controlled  Hypothyroid, follow up tsh  Elevated alt, follow up labs  Elevated cholesterol, no labs needed  Vit d defic, follow up labs  Impaired mobility and gait, due to leg weakness and decond  Skin lesion to derm  Leg dryness, poor skin hygiene  Health care maintenance  Leg weakness, suspect hip djd, ?back, has been 10 years so doubt eval will help  Elevated heart rate, she does seem very nervous and I suspect  that is related to that rather than any heart rhythm issues  Poor dentition, needs eval, she is convinced is from her thyroid but I told her I really do not think some    PLAN:  The patient refuses immunizations  She should see the dentist  Follow-up dermatology  Home care referral  Home physical therapy  Labs today  Skilled therapies to monitor and check blood pressure.     I had a lengthy discussion with the patient as well as her daughter Ashleigh who is in the room today.  Clearly she could benefit by more help at home and I explained to the patient that I want to try to keep her independent by getting her more help at home.  She is paranoid and does not seem to understand this.  She is currently a very high fall risk and if she does fall and have a fracture she will likely end up in a nursing home and I was quite heike and discussed this with the patient.  She needs to do a better job in terms of her overall care.  She needs to see the dentist.  She needs to see dermatology and I strongly recommended that she have somebody come in to her home daily to help her especially with her legs and skin care there.  She understands all this.    Gonzales Ayoub M.D.

## 2024-01-23 NOTE — RESULT ENCOUNTER NOTE
It was very nice to see you.  I wanted to get back to you with your test results.    Your TSH test indicates a normal thyroid level so I think we should continue the same dose.  Your complete blood count and vitamin D level are also fine.    Your chemistry panel shows a normal sodium and potassium level.  The creatinine which is a kidney test is just slightly higher at this time.  You do not have kidney issues but I will want to keep an eye on this.  Your calcium level is also slightly high which can go up and down for a variety of reasons.  Again I am not worried but I want to keep an eye on this.  Your sugar is just slightly high but not a problem and your liver tests are normal.    To be safe I would like to do a repeat blood test in 1 month.  You do not need to fast or see me for this but please be sure to get it done.  You can call my office to schedule it.    Please let me know if you have questions.    Gonzales Ayoub M.D.

## 2024-01-30 ENCOUNTER — TELEPHONE (OUTPATIENT)
Dept: FAMILY MEDICINE | Facility: CLINIC | Age: 84
End: 2024-01-30
Payer: MEDICARE

## 2024-01-30 NOTE — TELEPHONE ENCOUNTER
"Patient's daughter Ashleigh calling (on C2C) to check status of home care referral. RN relayed to Ashleigh Home Care referral was submitted by PCP and status is listed as \"pending\". Ashleigh verbalized understanding and stated she will check in with patient again today to verify if home care has attempted to contact patient.   "

## 2024-01-31 NOTE — TELEPHONE ENCOUNTER
Daughter and patient calling about Home Health Care. Gave the family the Home Care number to set up appointment.       Referral was completed by PCP on 1/22/2024.       Shaylee Benavides RN  [facility]

## 2024-02-01 ENCOUNTER — TELEPHONE (OUTPATIENT)
Dept: FAMILY MEDICINE | Facility: CLINIC | Age: 84
End: 2024-02-01
Payer: MEDICARE

## 2024-02-01 NOTE — TELEPHONE ENCOUNTER
Daughter Ashleigh calling via [employer]. States pt received letter about repeat blood work but unsure what it means. Writer read through below information with daughter and scheduled future lab appt. Daughter verbalized understanding and on board with plan of care.    Appointments in Next Year      Feb 20, 2024  9:45 AM  LAB with [facility]  [facility] ([facility] ) [phone]          MAHESH MCGOWAN RN on 2/1/2024 at 7:51 AM        Result Notes     Swapna May MA  1/23/2024 12:18 PM CST Back to Top      Letter sent to patient including test results.     ROSALIE Alejo MD  1/23/2024 12:11 PM CST       It was very nice to see you.  I wanted to get back to you with your test results.     Your TSH test indicates a normal thyroid level so I think we should continue the same dose.  Your complete blood count and vitamin D level are also fine.     Your chemistry panel shows a normal sodium and potassium level.  The creatinine which is a kidney test is just slightly higher at this time.  You do not have kidney issues but I will want to keep an eye on this.  Your calcium level is also slightly high which can go up and down for a variety of reasons.  Again I am not worried but I want to keep an eye on this.  Your sugar is just slightly high but not a problem and your liver tests are normal.     To be safe I would like to do a repeat blood test in 1 month.  You do not need to fast or see me for this but please be sure to get it done.  You can call my office to schedule it.     Please let me know if you have questions.     Gonzales Ayoub M.D.

## 2024-02-01 NOTE — TELEPHONE ENCOUNTER
Home Care is calling regarding an established patient with [facility].       Requesting orders from: Gonzales Ayoub  Provider is following patient: Yes  Is this a 60-day recertification request?  No    Orders Requested    Skilled Nursing  Request for delay in care, service is not able to be provided within same scheduled day.   02/05/24 due to staffing issues    Physical Therapy  Request for delay in care, service is not able to be provided within same scheduled day.   02/05/24 due to staffing shortages     Abiola Jack RN

## 2024-02-05 ENCOUNTER — TELEPHONE (OUTPATIENT)
Dept: FAMILY MEDICINE | Facility: CLINIC | Age: 84
End: 2024-02-05
Payer: MEDICARE

## 2024-02-05 NOTE — TELEPHONE ENCOUNTER
Okay for home care orders and if they can do an EKG that be great.    Thank you    Gonzales Ayoub M.D.

## 2024-02-05 NOTE — TELEPHONE ENCOUNTER
Home Care is calling regarding an established patient with [facility].       Requesting orders from: Gonzales Ayoub  Provider is following patient: Yes  Is this a 60-day recertification request?  No    Orders Requested    Skilled Nursing  Request for initial certification (first set of orders) RN reports that patient's heart rate was in the 120's today when she was visiting. Family reported that the patient's heart rate was in the 150's at her last office visit and then came down to 120. RN is asking what Dr. Ayoub would like for RN evaluation and follow up to patient's elevated heart rate.     Frequency: 1x/wk for 3 wks  then 1 every 3 weeks for 6 wks with  3 PRN visits    Physical Therapy  Request for initial evaluation and treatment (one time)     Occupational Therapy  Request for initial evaluation and treatment (one time)     Verbal orders given.  Home Care will send orders for provider to sign.  Confirmed ok to leave a detailed message with call back.  Contact information confirmed and updated as needed.    Natalee Arango RN

## 2024-02-05 NOTE — TELEPHONE ENCOUNTER
Called and left message for Hilda with the provider's response. Advised Hilda to call the clinic back with any additional questions and/or concerns.     Mehnaz Beaulieu RN

## 2024-02-07 NOTE — TELEPHONE ENCOUNTER
Hilda RN with [facility] called the clinic back. They do not have the capability to do an EKG.     Routing to PCP to review and advise.     Please call Hilda back with PCPs recommendations.

## 2024-02-07 NOTE — TELEPHONE ENCOUNTER
No ans RN Hilda [phone]  with Home Care.   Left Message to call clinic so we can ask her if pt could do an in-clinic EKG with an MA.   Will want to schedule on a day when Dr. Ayoub in clinic, so not on Wed.      To bad the [facility] program is no longer running.     Josefina WILL MA

## 2024-02-08 ENCOUNTER — TELEPHONE (OUTPATIENT)
Dept: FAMILY MEDICINE | Facility: CLINIC | Age: 84
End: 2024-02-08

## 2024-02-08 NOTE — TELEPHONE ENCOUNTER
Pt's daughter wants to know if she can get something in writing from PCP stating pt needs to have an EKG, consults with dermatology and oral surgeon. T  Triage informed [facility] from 01/22/2024 would have information requested. She can also request 01/22/2024 OV note but pt needs to sign a record release form.     Daughter asked if  PCP can call pt to reinforce need for EKG, follow up with mouth surgeon and dermatology. Triage called pt to let her know she has appt for EKG today but pt states she is not coming. She feels fine and PCP did not tell her anything. Pt states she feels fine. Pt states she doesn't trust her daughter since she is an alcoholic.     Routing message to PCP with daughters request. Please advice.

## 2024-02-08 NOTE — LETTER
2/8/2024        RE: Arline Link  [address]  [facility] [address]        Mrs. Link,    I understand that your heart rate is elevated and I would like you to get an EKG so I can evaluate this further.  Additionally, as we discussed that it is important for you to see a dermatology for the skin lesion and also an oral surgeon for your teeth.  Please do all of this soon.      Sincerely,        Gonzales Ayoub MD

## 2024-02-09 ENCOUNTER — TELEPHONE (OUTPATIENT)
Dept: FAMILY MEDICINE | Facility: CLINIC | Age: 84
End: 2024-02-09
Payer: MEDICARE

## 2024-02-09 NOTE — TELEPHONE ENCOUNTER
"FYI TO PCP    Hilda PHAN calling from . RN states pulse in 120's at visit today. Pt not having any other symptoms and no other concerns. At recent visit PCP recommended EKG if tachycardia continued but but pt has refused EKG or to go into clinic to be seen. RN states pt is intermittently confused and believes people are \"out to get her\". RN not sure if pt will continue to let home care continue but will continue to monitor.    Please write back if any follow up needed.    MAHESH MCGOWAN RN on 2/9/2024 at 4:13 PM    "

## 2024-02-09 NOTE — TELEPHONE ENCOUNTER
Pt's daughter wants PCP to call pt and reinforce pt needs to have EKG, dermatology appt and order surgeon.

## 2024-02-09 NOTE — TELEPHONE ENCOUNTER
Please let the patient know I would like her to have all of these.    Thank you    Gonzales Ayoub M.D.

## 2024-02-12 ENCOUNTER — TELEPHONE (OUTPATIENT)
Dept: FAMILY MEDICINE | Facility: CLINIC | Age: 84
End: 2024-02-12
Payer: MEDICARE

## 2024-02-12 NOTE — TELEPHONE ENCOUNTER
Home Care is calling regarding an established patient with [facility].        2/5/2024     1:15 PM   Home Care Information   Date of Home Care episode start 2/5/2024   Current following provider Dr. Ayoub   Date provider agreed to follow 1/30/2024    Name/Phone Number Marlena SYLVESTER, [phone]   Home Care agency [facility]     Requesting orders from: Gonzales Ayoub  Provider is following patient: Yes  Is this a 60-day recertification request?  No    Orders Requested    Occupational Therapy  Request for initial certification (first set of orders)   Frequency:  Every other week for 4wks    Social Work  Request for initial evaluation and treatment (one time)        Verbal orders given for Social Work.  Information was gathered for OT.  Provider review needed.  RN will contact Home Care with information after provider review.  Confirmed ok to leave a detailed message with call back.  Contact information confirmed and updated as needed.    Joanne Roman RN

## 2024-02-14 ENCOUNTER — TELEPHONE (OUTPATIENT)
Dept: FAMILY MEDICINE | Facility: CLINIC | Age: 84
End: 2024-02-14
Payer: MEDICARE

## 2024-02-14 ENCOUNTER — PATIENT OUTREACH (OUTPATIENT)
Dept: CARE COORDINATION | Facility: CLINIC | Age: 84
End: 2024-02-14
Payer: MEDICARE

## 2024-02-14 ASSESSMENT — ACTIVITIES OF DAILY LIVING (ADL): DEPENDENT_IADLS:: INDEPENDENT

## 2024-02-14 NOTE — TELEPHONE ENCOUNTER
"FYI-  Pt was called. Pt states she does not need a EKG. The HC nurse said everything was fine. \"That is just getting ridiculous\". Pt informed PCP has advised she do EKG and has ordered test. Pt denies symptoms  and hung up.   "

## 2024-02-14 NOTE — PROGRESS NOTES
Clinic Care Coordination Contact  Clinic Care Coordination Contact  OUTREACH    Referral Information:  Referral Source: PCP    [facility] homecare nurse notified PCP that there were concerns that pt needed EKG and was refusing and daughter has concerns that pt is not cooperating with homecare staff and is doing poorly.  DAVID was asked to reach out to daughter to instruct her on how to file a VA report.  DAVID called and spoke to daughter Ashleigh [phone].  C2C on file.  Ashleigh states that she is concerned that homecare nurse didn't call 911 as pt seemed very ill, but daughter is hoping that perhaps if she calls the [facility] phone number that DAVID gave her that this will help the documentation that is required to help assist in getting pt the additional support that she needs.  Daughter states she is very concerned with the state of pt poor hygiene and would gladly pay for any homecare, JONN, or any services needed in order to help pt live safety.  Daughter says she feels badly because she feels that pt is reacting the way she is out of fear.    Daughter will call in report to ALVIN goode and will reach back out to DAVID as needed to navigate possibly adding additional services at pt home.           Chief Complaint   Patient presents with    Clinic Care Coordination - Initial        Universal Utilization:      Utilization      No Show Count (past year)  0             ED Visits  0             Hospital Admissions  0                    Current as of: 2/13/2024  2:19 PM                Clinical Concerns:  Current Medical Concerns:  Psychosocial     Current Behavioral Concerns: Refusal of necessary evaluations    Education Provided to patient: CCC, MAARC       Health Maintenance Reviewed: Due/Overdue   Health Maintenance Due   Topic Date Due    DEXA  Never done    DTAP/TDAP/TD IMMUNIZATION (1 - Tdap) Never done    ZOSTER IMMUNIZATION (1 of 2) Never done    RSV VACCINE (Pregnancy & 60+) (1 - 1-dose 60+ series) Never done    Pneumococcal  Vaccine: 65+ Years (1 of 1 - PCV) Never done    MEDICARE ANNUAL WELLNESS VISIT  04/15/2022    INFLUENZA VACCINE (1) Never done    COVID-19 Vaccine (2 - 2023-24 season) 09/01/2023    LIPID  11/22/2023       Clinical Pathway: None    Medication Management:  Medication review status:        Functional Status:  Dependent ADLs:: Independent  Dependent IADLs:: Independent  Fallen 2 or more times in the past year?: Yes  Any fall with injury in the past year?: Yes    Living Situation:  Current living arrangement:: I live alone  Type of residence:: Private home - stairs    Lifestyle & Psychosocial Needs:    Social Determinants of Health     Food Insecurity: Not on file   Depression: Not at risk (1/22/2024)    PHQ-2     PHQ-2 Score: 0   Housing Stability: Not on file   Tobacco Use: Low Risk  (1/22/2024)    Patient History     Smoking Tobacco Use: Never     Smokeless Tobacco Use: Never     Passive Exposure: Not on file   Financial Resource Strain: Not on file   Alcohol Use: Not on file   Transportation Needs: Not on file   Physical Activity: Not on file   Interpersonal Safety: Not on file   Stress: Not on file   Social Connections: Not on file                    Resources and Interventions:  Current Resources:      Community Resources: None        Employment Status: retired         Advance Care Plan/Directive  Advanced Care Plans/Directives on file:: No    Referrals Placed: Community Resources         Care Plan:      Patient/Caregiver understanding: yes       Future Appointments                In 5 days Col[place] Hill DPM [facility]    In 6 days CS LAB [facility],             Plan: SW informed daughter and son in law of how to file a VA report.      Lydia Chairez  [facility]  Clinic Care Coordinator  [facility]  [facility]  [phone]  [email or web address]

## 2024-02-14 NOTE — TELEPHONE ENCOUNTER
Let me know if symptoms, chest pain, shortness of breath, fever, gi, genitourinary.  EKG if she will allow    Gonzales Ayoub M.D.

## 2024-02-14 NOTE — TELEPHONE ENCOUNTER
[facility] homecare RN calling in with [facility] for PCP that at this weeks visit pt pulse was elevated again at 112. Home care requesting any recommendations from provider. She states pt was advised to get an EKG but pt refused.     Home care will try and draw labs at next weeks visit.    Joanne Roman RN

## 2024-02-15 ENCOUNTER — TELEPHONE (OUTPATIENT)
Dept: FAMILY MEDICINE | Facility: CLINIC | Age: 84
End: 2024-02-15
Payer: MEDICARE

## 2024-02-15 DIAGNOSIS — R00.0 TACHYCARDIA: Primary | ICD-10-CM

## 2024-02-15 PROCEDURE — 99207 EKG 12-LEAD COMPLETE W/READ - CLINICS: CPT | Performed by: INTERNAL MEDICINE

## 2024-02-15 NOTE — TELEPHONE ENCOUNTER
BRENNON Tripathi with [facility] called the clinic to request a neurology referral for this pt.  She states this is due to cognitive decline and hallucinations for the past few weeks.     Routing to PCP to advise, referral pended for review..   Would you like triage to call the pt and assess? Or can referral be placed?    Thank you,  Lori Arora RN

## 2024-02-15 NOTE — TELEPHONE ENCOUNTER
"TO PCP:     Patient's daughter Ashleigh called:     Patient is \"not listening and refusing all care; she doesn't believe family or nurses about keeping visits\"     Pt tells daughter: \"Dr Ayoub is my best friend and I don't believe them unless he tells me. He did not recommend or order those visits. He doesn't want me to get anything done. I'm so healthy and look great, and he told me I'll see you next year\"     Follows everything PCP recommended but doesn't believe that PCP recommended her upcoming appointments      Daughter doesn't want to give up on her staying in her home - \"she will have a stroke if someone takes her out of her home\"     Daughter requesting PCP call patient and say \"Arline here are the things I want you to do/schedule. 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     Also states she [patient] is so mean to [facility] RN but RN is very patient and kind with Arline     Daughter concerned about pt's 130 resting HR - \"on verge of stroke but won't do anything\"     She is requesting PCP call patient directly at the number on file for patient and list out the things she needs done. She won't listen to family or any nurses     Please advise    Crissy HAWKINS, Triage RN  [facility]     "

## 2024-02-15 NOTE — TELEPHONE ENCOUNTER
Please let daughter know I called patient and she agrees to do all of these things.    Gonzales Ayoub M.D.

## 2024-02-15 NOTE — TELEPHONE ENCOUNTER
Called and spoke with pt. Pt declined a referral to neurology and stated she is fine mentally.     Routing to PCP as an FYI.

## 2024-02-16 NOTE — TELEPHONE ENCOUNTER
Pts daughter Ashleigh (c2c) called the clinic.     Daughter is asking to schedule EKG that PCP wants done. No order seen in chart.     Please place EKG order and call daughter back to schedule.

## 2024-02-19 ENCOUNTER — TELEPHONE (OUTPATIENT)
Dept: FAMILY MEDICINE | Facility: CLINIC | Age: 84
End: 2024-02-19

## 2024-02-19 DIAGNOSIS — R41.3 MEMORY LOSS: ICD-10-CM

## 2024-02-19 DIAGNOSIS — R00.0 TACHYCARDIA: Primary | ICD-10-CM

## 2024-02-19 PROCEDURE — 99207 EKG 12-LEAD COMPLETE W/READ - CLINICS: CPT | Performed by: INTERNAL MEDICINE

## 2024-02-19 NOTE — TELEPHONE ENCOUNTER
Daughter calling with EKG request for Arline. Need new order before scheduling. Please place the order. Patient will likely becoming in next week. Please call the daughter to schedule EKG.     SYLVESTER Culver  [facility]

## 2024-02-26 ENCOUNTER — TELEPHONE (OUTPATIENT)
Dept: FAMILY MEDICINE | Facility: CLINIC | Age: 84
End: 2024-02-26

## 2024-02-26 NOTE — TELEPHONE ENCOUNTER
I approve of requested home care orders.    Gonzales Ayoub MD    I doubt the patient would agree to a neuro consult but if she does help me know.    Thank you    Gonzales Ayoub M.D.

## 2024-02-26 NOTE — TELEPHONE ENCOUNTER
Kyleigh WILLETT calling to request verbal for PT eval and treat, verbal approval provided, see documentation below.    Orders Requested    Physical Therapy  Request for initial evaluation and treatment (one time) , writer provided verbal approval.  Verbal orders given.  Home Care will send orders for provider to sign.  Confirmed ok to leave a detailed message with call back.  Contact information confirmed and updated as needed.    Kyleigh WILLETT states pt is scoring mild to moderate cognitive decline, Kyleigh WILLETT believes neuro consult would be beneficial. Writer called to patient's daughter Ashleigh (C2C), who agreed with neuro consult.    PCP, please advise if neuro consult can be provided, order pended if appropriate.    Ashleigh also requested to schedule EKG and labs, writer scheduled:  3/1/2024 11:00 AM SISSY WIGGINS/LPN [facility] CS   3/1/2024 11:45 AM SISSY LAB [facility] CS     Mary Muro RN  -[facility]

## 2024-02-26 NOTE — TELEPHONE ENCOUNTER
Reason for call:  Other   Patient called regarding (reason for call): call back  Additional comments: patient has question for provider    Phone number to reach patient:  [phone]    Best Time:  any    Can we leave a detailed message on this number?  Not Applicable    Travel screening: Not Applicable

## 2024-02-27 NOTE — TELEPHONE ENCOUNTER
Patient's Daughter Ashleigh (C2C) Contact    Attempt # 1    Was call answered?  Yes. Writer relayed PCP's message below, Ashleigh expressed verbal understanding.    Ashleigh states that she has spoken with the patient and believes the patient would agree to a neuro consult as long as she was first evaluated by a neurologist, not just put in an MRI machine.    PCP, please advise if neuro referral can be provided, or if patient should discuss with PCP at next OV/VV appt.    Mary Muro RN  [facility]

## 2024-02-28 NOTE — TELEPHONE ENCOUNTER
See 2/19/24 thread, triage spoke with Ashleigh, daughter of patient yesterday     Crissy HAWKINS, Triage RN  [facility]

## 2024-02-29 ENCOUNTER — TELEPHONE (OUTPATIENT)
Dept: FAMILY MEDICINE | Facility: CLINIC | Age: 84
End: 2024-02-29
Payer: MEDICARE

## 2024-02-29 NOTE — TELEPHONE ENCOUNTER
Spoke to daughter Ashleigh. Daughter is asking if we can call the patient to talk about her possible UTI.     Please call patient Home [phone] .    Spoke to the patient and she refuses to come into the clinic today for possible UTI.  They have noticed that she is confused.     Patient states she has  no UTI symptoms.       Shaylee Benavides RN  -[facility]

## 2024-03-04 ENCOUNTER — VIRTUAL VISIT (OUTPATIENT)
Dept: FAMILY MEDICINE | Facility: CLINIC | Age: 84
End: 2024-03-04
Payer: MEDICARE

## 2024-03-04 DIAGNOSIS — R35.0 URINARY FREQUENCY: ICD-10-CM

## 2024-03-04 DIAGNOSIS — R79.89 ELEVATED SERUM CREATININE: ICD-10-CM

## 2024-03-04 DIAGNOSIS — E83.52 HYPERCALCEMIA: ICD-10-CM

## 2024-03-04 DIAGNOSIS — R00.0 TACHYCARDIA: ICD-10-CM

## 2024-03-04 DIAGNOSIS — E55.9 VITAMIN D DEFICIENCY: Primary | ICD-10-CM

## 2024-03-04 PROCEDURE — 93000 ELECTROCARDIOGRAM COMPLETE: CPT | Performed by: INTERNAL MEDICINE

## 2024-03-04 PROCEDURE — 99442 PR PHYSICIAN TELEPHONE EVALUATION 11-20 MIN: CPT | Mod: 93 | Performed by: INTERNAL MEDICINE

## 2024-03-04 NOTE — LETTER
March 5, 2024      Arline Link  [address] [place] [address]        Dear ,    We are writing to inform you of your test results.    I am happy to report that your EKG looks fine.       If you have any questions or concerns, please call the clinic at the number listed above.       Sincerely,      Gonzales Ayoub MD

## 2024-03-04 NOTE — PROGRESS NOTES
Arline is a 83 year old who is being evaluated via a billable telephone visit.      What phone number would you like to be contacted at? [phone]  How would you like to obtain your AVS? Balhart  Originating Location (pt. Location): Home    Distant Location (provider location):  On-site    [phone], daughters cell    I called and spoke with patient and grand-daughter.     Follow up last office visit.      Urine is fine, some frequency but not new or changed. No dysuria, no blood, no fever.      For the skin lesion she went to derm and is using a skin cream.  He looked at the skin lesion and did not feel it was cancer.    She has not seen the dentist yet but will.    She has not done the EKG yet.  She has not had a follow-up labs for elevated creatinine and elevated calcium.    The patient herself feels great.  She has no complaints.  I did speak with her and the granddaughter and explained the need to do further testing and the patient assures me that she will come in for it.    ASSESSMENT:  Elevated creatinine, not sure if significant, repeat it  Elevated calcium, repeat it  Urinary frequency, check urine studies    PLAN:  Follow up in the clinic to check urine, blood and EKG    Later, 3/5/24, EKG - sinus tachy, min t wave changes, likely insignificant.    Gonzales Ayoub M.D.      Gonzales Ayoub M.D.  18 minutes on the day of the encounter doing chart review, history and exam, documentation and further activities as noted above.

## 2024-03-05 ENCOUNTER — LAB (OUTPATIENT)
Dept: LAB | Facility: CLINIC | Age: 84
End: 2024-03-05
Payer: MEDICARE

## 2024-03-05 ENCOUNTER — ALLIED HEALTH/NURSE VISIT (OUTPATIENT)
Dept: FAMILY MEDICINE | Facility: CLINIC | Age: 84
End: 2024-03-05
Payer: MEDICARE

## 2024-03-05 DIAGNOSIS — R00.0 TACHYCARDIA: Primary | ICD-10-CM

## 2024-03-05 DIAGNOSIS — E83.52 HYPERCALCEMIA: ICD-10-CM

## 2024-03-05 DIAGNOSIS — E55.9 VITAMIN D DEFICIENCY: ICD-10-CM

## 2024-03-05 DIAGNOSIS — R35.0 URINARY FREQUENCY: ICD-10-CM

## 2024-03-05 DIAGNOSIS — R79.89 ELEVATED SERUM CREATININE: ICD-10-CM

## 2024-03-05 DIAGNOSIS — I10 BENIGN ESSENTIAL HYPERTENSION: ICD-10-CM

## 2024-03-05 LAB
CA-I BLD-MCNC: 4.8 MG/DL (ref 4.4–5.2)
HOLD SPECIMEN: NORMAL

## 2024-03-05 PROCEDURE — 99207 PR NO CHARGE NURSE ONLY: CPT

## 2024-03-05 PROCEDURE — 83970 ASSAY OF PARATHORMONE: CPT

## 2024-03-05 PROCEDURE — 36415 COLL VENOUS BLD VENIPUNCTURE: CPT

## 2024-03-05 PROCEDURE — 82330 ASSAY OF CALCIUM: CPT

## 2024-03-05 PROCEDURE — 80048 BASIC METABOLIC PNL TOTAL CA: CPT

## 2024-03-06 ENCOUNTER — TELEPHONE (OUTPATIENT)
Dept: FAMILY MEDICINE | Facility: CLINIC | Age: 84
End: 2024-03-06
Payer: MEDICARE

## 2024-03-06 LAB
ANION GAP SERPL CALCULATED.3IONS-SCNC: 15 MMOL/L (ref 7–15)
BUN SERPL-MCNC: 13.9 MG/DL (ref 8–23)
CALCIUM SERPL-MCNC: 10.3 MG/DL (ref 8.8–10.2)
CHLORIDE SERPL-SCNC: 102 MMOL/L (ref 98–107)
CREAT SERPL-MCNC: 1.26 MG/DL (ref 0.51–0.95)
DEPRECATED HCO3 PLAS-SCNC: 21 MMOL/L (ref 22–29)
EGFRCR SERPLBLD CKD-EPI 2021: 42 ML/MIN/1.73M2
GLUCOSE SERPL-MCNC: 102 MG/DL (ref 70–99)
POTASSIUM SERPL-SCNC: 4.4 MMOL/L (ref 3.4–5.3)
SODIUM SERPL-SCNC: 138 MMOL/L (ref 135–145)

## 2024-03-06 NOTE — TELEPHONE ENCOUNTER
"Romana Pinzon, Clinic Supervisor with [facility] called the clinic regarding this pt.     Pt needed a face-to-face visit, and had a VV on 3/4. However, since this visit was a telephone visit and not a video visit, it cannot be used as a face-to-face.     Romana Pinzon is requesting that PCP addend 1/22/24 OV note.  Under \"PLAN\", it would need to state, \"Skilled therapies to monitor and check blood pressure.\"  If this is not done, [facility] will need to discontinue home cares.     Home care also requests a verbal order from PCP to place PT, OT, and Skilled Nursing on hold until tomorrow 3/7.    Routing to PCP HP (Romana Pinzon is hoping to hear back today). Can 1/22/24 OV note be addended?    Triage to call Romana Pinzon back with update/verbal orders.   Can we leave a detailed message on this number? YES  Phone number Romanasidney Pinzon can be reached at: Other phone number: [phone]    Lori Arora, RN  [facility] Triage  "

## 2024-03-06 NOTE — TELEPHONE ENCOUNTER
Yes and note done    Gonzales Ayoub M.D.     Dorsal Nasal Flap Text: The defect edges were debeveled with a #15 scalpel blade.  Given the location of the defect and the proximity to free margins a dorsal nasal flap was deemed most appropriate.  Using a sterile surgical marker, an appropriate dorsal nasal flap was drawn around the defect.    The area thus outlined was incised deep to adipose tissue with a #15 scalpel blade.  The skin margins were undermined to an appropriate distance in all directions utilizing iris scissors.

## 2024-03-07 LAB — PTH-INTACT SERPL-MCNC: 44 PG/ML (ref 15–65)

## 2024-03-10 NOTE — RESULT ENCOUNTER NOTE
I am happy to report that the labs are better.  The kidney test or creatinine is improved and your calcium and parathyroid levels are fine.  I would ask that you see me in 6 months in the office.    If you have any questions please let me know.    Gonzales Ayoub M.D.

## 2024-03-14 ENCOUNTER — MYC MEDICAL ADVICE (OUTPATIENT)
Dept: FAMILY MEDICINE | Facility: CLINIC | Age: 84
End: 2024-03-14
Payer: MEDICARE

## 2024-03-15 ENCOUNTER — TELEPHONE (OUTPATIENT)
Dept: FAMILY MEDICINE | Facility: CLINIC | Age: 84
End: 2024-03-15

## 2024-03-15 NOTE — TELEPHONE ENCOUNTER
Pts gwen called the clinic to follow up on this.     Writer explained to pt granddaughter that the pt has to sign the c2c form and check which boxes of information she wants us to share and with whom.     Pts grand daughter asked that the form be mailed to pt again.     Routing to team to review and advise.

## 2024-03-15 NOTE — TELEPHONE ENCOUNTER
Patient contacted clinic specifying:    Consent to communicate OK for grandkids: Lamar Sheikh, Linda Sheikh, and Danilo Sheikh.    Does NOT want daughter, Ashleigh, to have access to medical records    Would like Ashleigh removed from Emergency Contacts and Grandchildren added

## 2024-03-15 NOTE — TELEPHONE ENCOUNTER
Spoke with pt's daughter Ashleigh (C2C) to let her know that Consent to communicate needs to be sign by patient. She is going to talk to Lamar to let her know.

## 2024-03-18 ENCOUNTER — DOCUMENTATION ONLY (OUTPATIENT)
Dept: OTHER | Facility: CLINIC | Age: 84
End: 2024-03-18
Payer: MEDICARE

## 2024-03-22 DIAGNOSIS — Z71.89 OTHER SPECIFIED COUNSELING: Primary | Chronic | ICD-10-CM

## 2024-03-24 ENCOUNTER — HEALTH MAINTENANCE LETTER (OUTPATIENT)
Age: 84
End: 2024-03-24

## 2024-03-26 ENCOUNTER — PATIENT OUTREACH (OUTPATIENT)
Dept: CARE COORDINATION | Facility: CLINIC | Age: 84
End: 2024-03-26
Payer: MEDICARE

## 2024-03-26 ASSESSMENT — ACTIVITIES OF DAILY LIVING (ADL): DEPENDENT_IADLS:: INDEPENDENT

## 2024-03-26 NOTE — PROGRESS NOTES
Clinic Care Coordination Contact  Clinic Care Coordination Contact  OUTREACH    Referral Information:  Referral Source: PCP  SW received referral from PCP to check with pt on whether she may need any support in writing her HCD.  SW called and spoke to pt at length.  SW explained that Dr. Lieberman and clinic wanted to ensure that pt had the support and information she needed in order to complete the HCD.  Pt shares that she and her granddaughter will be sitting down together this weekend to work on it together.  Pt shares that she has four children and she  at 16 and adored raising children and was heavily involved in raising her grandchildren as well.  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  Pt shares that she loves her home and neighborhood very much and she intends to stay there.  SW asks if the house itself is getting to be a burden and pt says she can hire help when needed and although it is hard at times, she feels she is doing well.  SW shares that pt is welcome to reach out to SW if she has stressors or questions, and SW will work to assist her.  Pt shares that it is very nice to know that, and she will save SW number in her phone.  SW will also mail pt a letter and she can keep that or share the contact information as she chooses.  Pt shares that she feels that at this time, she trusts her three grandchildren Lamar, Linda, and Danilo Sheikh to be her Emergency Contacts and she will be having them assigned as her Earl Care Agents.  SW inquired whether she has discussed her wishes with them in regards to her her health care, and pt states that she has spoken with them at length.  Pt did not wish to discuss the specifics with SW about her wishes.  SW asks if she feels comfortable sharing reasons she does not want her children or their spouses listed as Emergency Contacts or HCA and she says  "\"they want to get rid of me\" but does not want to elaborate.  SW empathizes that these are very difficult and emotional topics, and SW is available if pt ever does wish to discuss them.  SW does mention that pt can amend her HCD later on if she chooses to.  Pt does share that she loves her children and their spouses, and all of her 21 grandchildren and great-grandchildren.  She does state that she feels that the three grandchildren named above have the ability and willingness to represent her wishes.  SW asks if she would like her Emergency Contacts changed today, as SW can do that.  Pt agrees, and SW has changed the Emergency Contacts to reflect her grandchildren as the first three contacts, although SW does not have authority to remove contacts, SW has moved the adult children to the bottom and put in a sticky note for hospital staff to call grandchildren and not adult children.           Chief Complaint   Patient presents with    Clinic Care Coordination - Initial        Universal Utilization:      Utilization      No Show Count (past year)  0             ED Visits  0             Hospital Admissions  0                    Current as of: 3/24/2024 12:00 PM                Clinical Concerns:  Current Medical Concerns:  Psychosocial     Current Behavioral Concerns: N/A    Education Provided to patient: CCC, HCD, Emergency Contacts, Honoring Choices      Health Maintenance Reviewed:    Clinical Pathway: None    Medication Management:  Medication review status:        Functional Status:  Dependent ADLs:: Independent  Dependent IADLs:: Independent    Living Situation:  Current living arrangement:: I live alone  Type of residence:: Private home - stairs    Lifestyle & Psychosocial Needs:    Social Determinants of Health     Food Insecurity: Not on file   Depression: Not at risk (1/22/2024)    PHQ-2     PHQ-2 Score: 0   Housing Stability: Not on file   Tobacco Use: Low Risk  (3/4/2024)    Patient History     Smoking " Tobacco Use: Never     Smokeless Tobacco Use: Never     Passive Exposure: Not on file   Financial Resource Strain: Not on file   Alcohol Use: Not on file   Transportation Needs: Not on file   Physical Activity: Not on file   Interpersonal Safety: Not on file   Stress: Not on file   Social Connections: Not on file        Transportation means:: Family          Resources and Interventions:  Current Resources:      Community Resources: None        Employment Status: retired         Advance Care Plan/Directive  Advanced Care Plans/Directives on file:: No  Discussed with patient/caregiver:: Other (Comment) (We talked about HCD, and she is working with her granddaughter this weekend on the document.  She feels that her grandchildren have her best interests at heart and are very clear on her wishes.)    Referrals Placed: Community Resources         Care Plan:      Patient/Caregiver understanding: yes       Future Appointments                In 2 months Pasha Edwards MD [facility]            Plan: Pt will return HCD after she works on it with her granddaughter.  She is aware that she can call  for assistance if needed.         Lydia Chairez,  [facility]  Clinic Care Coordinator  [facility]  [phone]  [email or web address]

## 2024-03-26 NOTE — LETTER
[facility] CARE COORDINATION  [address]  [place] [address]    March 26, 2024    Arline Link  [address]  [place] [address]      Dear Arline,    I am a clinic care coordinator who works with Gonzales Ayoub MD with the [facility]. I wanted to thank you for spending the time to talk with me.  Below is a description of clinic care coordination and how I can further assist you.       The clinic care coordination team is made up of a registered nurse, , financial resource worker and community health worker who understand the health care system. The goal of clinic care coordination is to help you manage your health and improve access to the health care system. Our team works alongside your provider to assist you in determining your health and social needs. We can help you obtain health care and community resources, providing you with necessary information and education. We can work with you through any barriers and develop a care plan that helps coordinate and strengthen the communication between you and your care team.  Our services are voluntary and are offered without charge to you personally.    Please feel free to contact me with any questions or concerns regarding care coordination and what we can offer.      We are focused on providing you with the highest-quality healthcare experience possible.    Sincerely,     Lydia Chairez, [facility]  Clinic Care Coordinator  [facility]  [phone]

## 2024-03-26 NOTE — Clinical Note
Arline seems very capable of making decisions.  I sense that there are some emotional issues perhaps, but her grandchildren are adults and she says they are more than willing to assist and I see that grandson is already signedup as a proxy for her Mychart and she is happy for their help.  I hope that she is going to reach out to me if I can be of assistance to her, I tried to make a connection.  Please let me know if I can assist further.

## 2024-04-03 ENCOUNTER — TELEPHONE (OUTPATIENT)
Dept: FAMILY MEDICINE | Facility: CLINIC | Age: 84
End: 2024-04-03
Payer: MEDICARE

## 2024-04-03 NOTE — TELEPHONE ENCOUNTER
Home Care is calling regarding an established patient with [facility].        2/5/2024     1:15 PM   Home Care Information   Date of Home Care episode start 2/5/2024   Current following provider Dr. Ayoub   Date provider agreed to follow 1/30/2024    Name/Phone Number Marlena PHAN, [phone]   Home Care agency [facility]     Requesting orders from: Gonzales Ayoub  Provider is following patient: Yes  Is this a 60-day recertification request?  No    Orders Requested      FYI: Patient is declining Home Care      Information was gathered and will be sent to provider for review.  RN will contact Home Care with information after provider review.  Confirmed ok to leave a detailed message with call back.  Contact information confirmed and updated as needed.    Shaylee Benavides RN

## 2024-08-12 ENCOUNTER — APPOINTMENT (OUTPATIENT)
Dept: GENERAL RADIOLOGY | Facility: CLINIC | Age: 84
End: 2024-08-12
Attending: EMERGENCY MEDICINE
Payer: MEDICARE

## 2024-08-12 ENCOUNTER — HOSPITAL ENCOUNTER (EMERGENCY)
Facility: CLINIC | Age: 84
Discharge: HOME | End: 2024-08-12
Attending: EMERGENCY MEDICINE | Admitting: EMERGENCY MEDICINE
Payer: MEDICARE

## 2024-08-12 VITALS
TEMPERATURE: 99.2 F | RESPIRATION RATE: 18 BRPM | HEART RATE: 96 BPM | SYSTOLIC BLOOD PRESSURE: 166 MMHG | DIASTOLIC BLOOD PRESSURE: 114 MMHG | OXYGEN SATURATION: 96 %

## 2024-08-12 DIAGNOSIS — R26.2 DIFFICULTY WALKING: ICD-10-CM

## 2024-08-12 DIAGNOSIS — M19.071 ARTHRITIS OF RIGHT ANKLE: ICD-10-CM

## 2024-08-12 DIAGNOSIS — G89.29 CHRONIC RIGHT HIP PAIN: ICD-10-CM

## 2024-08-12 DIAGNOSIS — M25.551 CHRONIC RIGHT HIP PAIN: ICD-10-CM

## 2024-08-12 LAB
ALBUMIN UR-MCNC: 20 MG/DL
ANION GAP SERPL CALCULATED.3IONS-SCNC: 13 MMOL/L (ref 7–15)
APPEARANCE UR: CLEAR
BILIRUB UR QL STRIP: NEGATIVE
BUN SERPL-MCNC: 12.3 MG/DL (ref 8–23)
CALCIUM SERPL-MCNC: 10.2 MG/DL (ref 8.8–10.4)
CHLORIDE SERPL-SCNC: 102 MMOL/L (ref 98–107)
COLOR UR AUTO: YELLOW
CREAT SERPL-MCNC: 1.35 MG/DL (ref 0.51–0.95)
EGFRCR SERPLBLD CKD-EPI 2021: 39 ML/MIN/1.73M2
ERYTHROCYTE [DISTWIDTH] IN BLOOD BY AUTOMATED COUNT: 13.2 % (ref 10–15)
GLUCOSE SERPL-MCNC: 100 MG/DL (ref 70–99)
GLUCOSE UR STRIP-MCNC: NEGATIVE MG/DL
HCO3 SERPL-SCNC: 22 MMOL/L (ref 22–29)
HCT VFR BLD AUTO: 44.6 % (ref 35–47)
HGB BLD-MCNC: 15.3 G/DL (ref 11.7–15.7)
HGB UR QL STRIP: NEGATIVE
KETONES UR STRIP-MCNC: NEGATIVE MG/DL
LEUKOCYTE ESTERASE UR QL STRIP: NEGATIVE
MCH RBC QN AUTO: 31.8 PG (ref 26.5–33)
MCHC RBC AUTO-ENTMCNC: 34.3 G/DL (ref 31.5–36.5)
MCV RBC AUTO: 93 FL (ref 78–100)
MUCOUS THREADS #/AREA URNS LPF: PRESENT /LPF
NITRATE UR QL: NEGATIVE
PH UR STRIP: 5.5 [PH] (ref 5–7)
PLATELET # BLD AUTO: 245 10E3/UL (ref 150–450)
POTASSIUM SERPL-SCNC: 4.7 MMOL/L (ref 3.4–5.3)
RBC # BLD AUTO: 4.81 10E6/UL (ref 3.8–5.2)
RBC URINE: 1 /HPF
SODIUM SERPL-SCNC: 137 MMOL/L (ref 135–145)
SP GR UR STRIP: 1.02 (ref 1–1.03)
SQUAMOUS EPITHELIAL: 1 /HPF
UROBILINOGEN UR STRIP-MCNC: NORMAL MG/DL
WBC # BLD AUTO: 6.9 10E3/UL (ref 4–11)
WBC URINE: 1 /HPF

## 2024-08-12 PROCEDURE — 85027 COMPLETE CBC AUTOMATED: CPT | Performed by: EMERGENCY MEDICINE

## 2024-08-12 PROCEDURE — 73502 X-RAY EXAM HIP UNI 2-3 VIEWS: CPT

## 2024-08-12 PROCEDURE — 99284 EMERGENCY DEPT VISIT MOD MDM: CPT

## 2024-08-12 PROCEDURE — 36415 COLL VENOUS BLD VENIPUNCTURE: CPT | Performed by: EMERGENCY MEDICINE

## 2024-08-12 PROCEDURE — 81003 URINALYSIS AUTO W/O SCOPE: CPT | Performed by: EMERGENCY MEDICINE

## 2024-08-12 PROCEDURE — 82374 ASSAY BLOOD CARBON DIOXIDE: CPT | Performed by: EMERGENCY MEDICINE

## 2024-08-12 PROCEDURE — 73610 X-RAY EXAM OF ANKLE: CPT | Mod: RT

## 2024-08-12 ASSESSMENT — COLUMBIA-SUICIDE SEVERITY RATING SCALE - C-SSRS
6. HAVE YOU EVER DONE ANYTHING, STARTED TO DO ANYTHING, OR PREPARED TO DO ANYTHING TO END YOUR LIFE?: NO
1. IN THE PAST MONTH, HAVE YOU WISHED YOU WERE DEAD OR WISHED YOU COULD GO TO SLEEP AND NOT WAKE UP?: NO
2. HAVE YOU ACTUALLY HAD ANY THOUGHTS OF KILLING YOURSELF IN THE PAST MONTH?: NO

## 2024-08-12 ASSESSMENT — ACTIVITIES OF DAILY LIVING (ADL)
ADLS_ACUITY_SCORE: 35

## 2024-08-12 NOTE — DISCHARGE INSTRUCTIONS
As discussed, you have significant chronic orthopedic issues in your hip and ankle.  However, you have made it exceedingly clear you do not desire surgery or other intervention.  Your family is very worried about your ability to care for yourself at home.  However, you feel that you are caring for yourself appropriately and you otherwise have a reassuring exam.  We discussed admitting you to the hospital for orthopedic consultation, physical therapy, and rehab.  However, you have refused these interventions, demanding to be discharged home.  If you change your mind at any point about seeking advanced care for these chronic issues, do not hesitate to return to the ER.  Otherwise, I have put information for our orthopedic team above with whom you can follow-up at any time.    Discharge Instructions  Chronic Pain Management  You were seen today for an issue regarding chronic or recurrent pain. You may have a condition that gives you pain every day, or a condition that causes pain that keeps coming back, or several conditions involving pain.   We will evaluate you for your symptoms to ensure that there is no medical emergency. This evaluation usually takes the form of a good medical history (interview) and physical examination. Rarely, imaging (x-rays or CAT scans) and lab work (blood tests) may be necessary in addition to the history and examination. This approach is similar to our approach to other chronic/recurrent diseases where we evaluate for emergencies but leave much of the management of the problem to the regular provider/clinic.  We will offer suggestions to manage your pain but we do not generally utilize opiate pain medications for recurrent or chronic pain. There is an ongoing public health crisis with respect to opiates and we are aware of the risks to our patients from opiate pain medications. Opiates are strong pain relievers but they carry significant risks including sedation, confusion, constipation,  falls, as well as dependence, addiction, and overdose-related deaths. These medications represent a significant risk to your health and are therefore reserved for the management of select conditions associated with acute, severe pain. For many conditions, the risks of opiate treatment simply outweigh the benefits. Additionally, for patients with chronic/recurrent pain or who frequently use opiates, management of pain needs to be performed by a single physician/provider who can follow their care consistently. As a result, we generally do not provide refills of opiates or treat chronic pain with injected opiates in the Emergency Department. It is with your best interests in mind that we adhere to these widely accepted best practices.    As with other chronic diseases like diabetes and asthma, management of chronic pain is best performed by regular visits to the same provider. In the case of chronic pain, this may be your primary physician/provider, your neurologist or other specialist, or with a chronic pain clinic/provider.  If you have concerns about our policy, please discuss them with your primary care provider or pain specialist, who can contact us if necessary for further information or clarification.  If you were given a prescription for medicine here today, be sure to read all of the information (including the package insert) that comes with your prescription.  This will include important information about the medicine, its side effects, and any warnings that you need to know about.  The pharmacist who fills the prescription can provide more information and answer questions you may have about the medicine.  If you have questions or concerns that the pharmacist cannot address, please call or return to the Emergency Department.   Remember that you can always come back to the Emergency Department if you develop any new symptoms or if there is anything that worries you.

## 2024-08-12 NOTE — ED NOTES
Bed: ED18  Expected date:   Expected time:   Means of arrival:   Comments:  Tasha 1 84 F fail to thrive weakness on hold eta 0808

## 2024-08-12 NOTE — ED NOTES
Pt's dtr Ashleigh aware that pt discharging with EMS to residence. Family/friend will be waiting for EMS at residence.

## 2024-08-12 NOTE — ED NOTES
"Patient did stand with walker and take a few steps forward; distance~3 feet. Patient agitated and yelling about the walker not having wheels. Patient repeatedly yelling \"I'm going to fall!\" Patient very anxious/agitated and appeared out of breath  "

## 2024-08-12 NOTE — ED PROVIDER NOTES
Emergency Department Note      History of Present Illness     Chief Complaint   Hip Pain and Social Work Services    HPI   Arline Link is a 84 year old female with a history of hypercholesterolemia presenting to the ED for evaluation of hip pain. The patient reports that she has chronic right hip stiffness and pain, but does not want to pursue any surgery due to her age. She lives alone, but when her neighbor came to check on her his morning, the patient was unable to walk and they called EMS. Police placed her on a hold because she was refusing to come to the hospital. Upon exam, the patient states that her pain is not acutely worse today, and she is able to ambulate at home with a walker. She adds that she has been eating and drinking okay and feels safe living alone. The patient denies abdominal pain, lower leg pain, or any recent falls.    Independent Historian   None    Review of External Notes   I reviewed the primary care communications with home health care and her primary team through the Spring and Summer regarding her chronic medical problems.     Past Medical History     Medical History and Problem List   Benign essential hypertension  Elevated ALT measurement  Hypercholesterolemia  Hypothyroid  Mucinosis of skin  Vitamin D deficiency     Medications   Tylenol  Cozaar  Synthroid   Vitamin D     Surgical History   Appendectomy  Tonsillectomy and adenoidectomy     Physical Exam     Patient Vitals for the past 24 hrs:   BP Temp Temp src Pulse Resp SpO2   08/12/24 1535 (!) 148/95 -- -- -- 18 95 %   08/12/24 1300 (!) 148/78 -- -- 98 18 98 %   08/12/24 0819 (!) 143/78 99.2  F (37.3  C) Temporal 98 16 95 %     Physical Exam  Eye:  Pupils are equal, round, and reactive.  Extraocular movements intact.    ENT:  No rhinorrhea.  Moist mucus membranes.  Normal tongue and tonsil.    Cardiac:  Regular rate and rhythm.  No murmurs, gallops, or rubs.    Pulmonary:  Clear to auscultation bilaterally.  No wheezes,  rales, or rhonchi.    Abdomen:  Positive bowel sounds.  Abdomen is soft and non-distended, without focal tenderness.    Musculoskeletal:  Normal movement of all extremities without evidence for deficit. Right ankle appears chronically fused, held in inversion and plantar flexion. Positive discomfort with range of motion of the right hip.     Skin:  Warm and dry without rashes. There is evidence of stasis dermatitis to the lower extremities, left greater than right, chronic for patient.     Neurologic:  Non-focal exam without asymmetric weakness or numbness.     Psychiatric:  Normal affect with appropriate interaction with examiner.     Diagnostics     Lab Results   Labs Ordered and Resulted from Time of ED Arrival to Time of ED Departure   BASIC METABOLIC PANEL - Abnormal       Result Value    Sodium 137      Potassium 4.7      Chloride 102      Carbon Dioxide (CO2) 22      Anion Gap 13      Urea Nitrogen 12.3      Creatinine 1.35 (*)     GFR Estimate 39 (*)     Calcium 10.2      Glucose 100 (*)    ROUTINE UA WITH MICROSCOPIC REFLEX TO CULTURE - Abnormal    Color Urine Yellow      Appearance Urine Clear      Glucose Urine Negative      Bilirubin Urine Negative      Ketones Urine Negative      Specific Gravity Urine 1.023      Blood Urine Negative      pH Urine 5.5      Protein Albumin Urine 20 (*)     Urobilinogen Urine Normal      Nitrite Urine Negative      Leukocyte Esterase Urine Negative      Mucus Urine Present (*)     RBC Urine 1      WBC Urine 1      Squamous Epithelials Urine 1     CBC WITH PLATELETS - Normal    WBC Count 6.9      RBC Count 4.81      Hemoglobin 15.3      Hematocrit 44.6      MCV 93      MCH 31.8      MCHC 34.3      RDW 13.2      Platelet Count 245         Imaging   Ankle XR, G/E 3 views, right   Final Result   IMPRESSION:   No displaced fracture. Severe diffuse osteopenia which limits   evaluation of nondisplaced fractures and subtle osseous lesions.   Plantar calcaneal heel spur. Moderate  degenerative changes in the   tibiotalar and mid foot joints.      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 Severe resulting subcortical sclerosis and cystic   change. No displaced fracture. Severe diffuse osseous   demineralization. Mild left hip joint space narrowing.      Ruddy Lombardo MD            SYSTEM ID:  [id number]        Independent Interpretation   None    ED Course      Medications Administered   Medications - No data to display    Discussion of Management   None    ED Course   ED Course as of 08/12/24 1600   Mon Aug 12, 2024   0822 I obtained history and examined the patient as noted above.     0846 I rechecked the patient and discussed doing a road test.    0923 I rechecked the patient and spoke with her family.      Additional Documentation  None    Medical Decision Making / Diagnosis     CMS Diagnoses: None    MIPS       None    MDM   Arline Link is a 84 year old female presenting to us because of failure to thrive and weakness.  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  She limps with use of a walker and does live independently, though her neighbor notes that she gets checked on daily and does not get around well, spending most of her time in bed.  However, she has been able to get to and from the bathroom and kitchen on her own.    The neighbor was called to the patient's house this morning when she could not get off the toilet.  The friend was able to get her to her feet, though the patient was then unable to walk far enough to get out of the bathroom and back into bed.  With this inability to ambulate, EMS was eventually called.  They felt that the patient was unsafe at home and therefore placed her on a hold and  "transported her here to the emergency department against her will.    On my initial assessment, the patient is rather cantankerous.  She frequently calls myself and her daughter names with signs of paranoia.  \"You just want to take all of my money.\"  However, the patient is able to tell me where she is and the date.  She understands that she does not get around well but \"I get around just fine and have been doing so for a long time.\"  She is also very clear \"you will never take me out of my house.\"  Her daughter voices concerns that her mother is going to fall and is not caring for herself well in her current scenario.  I was able to speak with her neighbor who describes bringing her food on a daily basis and needing to help her with her activities around the house.  The patient had 1 other spell over the weekend where she got stuck on the toilet and required help to get up.    Initially, the patient was resistant to any workup.  In the end, she did agree to undergo basic labs and imaging.  Fortunately, her laboratory investigation is completely unremarkable.  Her ankle shows osteopenia and arthritis but no fracture.  Her right hip shows end-stage arthritis, unknown condition.  She has lower extremity edema that is bilateral, slightly worse on the left than the right.  With her immobility, I recommended an ultrasound of her legs to rule out DVT.  She is adamant she will not undergo any further tests and refuses this despite my strongest recommendations.    At this point, I spoke with the patient and her family/neighbor at length of how to proceed.  I explained that I do not feel that this is a good idea to be discharged home.  I explained that the patient is having increasing weakness and inability to care for self.  In the end, the patient was adamant that she would not be admitted to the hospital.  While she does show signs of paranoia and some mild dementia, I do not believe that she is lacking the capacity to " understand the situation and to make decisions for herself.  Her daughter concurs with this.  In the end, while it is not in the best interest of the patient, she will be discharged home at her request.  I could not have been more clear with her There should be no hesitation to return to the ER if she changes her mind about admission or if she comes to the conclusion that she is unsafe in her current situation.  Review of her chart shows that she has refused home care in the past but I have advised her to continue to work through her primary doctor on other assistance at home.    Disposition   The patient was discharged.     Diagnosis     ICD-10-CM    1. Chronic right hip pain  M25.551     G89.29       2. Arthritis of right ankle  M19.071       3. Difficulty walking  R26.2          Scribe Disclosure:  I, Tana Abel, am serving as a scribe at 8:21 AM on 8/12/2024 to document services personally performed by Trierweiler, Chad A, MD based on my observations and the provider's statements to me.        Trierweiler, Chad A, MD  08/12/24 6573

## 2024-08-12 NOTE — ED TRIAGE NOTES
EMS report: lives alone, needs hip surgery but is afraid to get it so hasn't and just deals with the pain. A neighbor comes and helps once a day in the morning but today patient couldn't really walk. No family to care for patient. PD placed on hold as patient did not want to come in. Labile emotions.      Triage Assessment (Adult)       Row Name 08/12/24 0864          Triage Assessment    Airway WDL WDL        Respiratory WDL    Respiratory WDL WDL        Skin Circulation/Temperature WDL    Skin Circulation/Temperature WDL WDL        Cardiac WDL    Cardiac WDL WDL        Peripheral/Neurovascular WDL    Peripheral Neurovascular WDL WDL        Cognitive/Neuro/Behavioral WDL    Cognitive/Neuro/Behavioral WDL WDL

## 2024-08-14 ENCOUNTER — PATIENT OUTREACH (OUTPATIENT)
Dept: FAMILY MEDICINE | Facility: CLINIC | Age: 84
End: 2024-08-14
Payer: MEDICARE

## 2024-08-15 NOTE — TELEPHONE ENCOUNTER
ED / Discharge Outreach Protocol  Since this was attempt #2 but unsuccessful at reaching pt, closing encounter.    Patient Contact    Attempt # 2    Was call answered?  No.  Left message on voicemail with information to call me back.

## 2024-10-09 ENCOUNTER — HOSPITAL ENCOUNTER (EMERGENCY)
Facility: CLINIC | Age: 84
Discharge: HOME | End: 2024-10-09
Attending: EMERGENCY MEDICINE | Admitting: EMERGENCY MEDICINE
Payer: MEDICARE

## 2024-10-09 ENCOUNTER — TELEPHONE (OUTPATIENT)
Dept: INTERNAL MEDICINE | Facility: CLINIC | Age: 84
End: 2024-10-09

## 2024-10-09 VITALS
SYSTOLIC BLOOD PRESSURE: 164 MMHG | DIASTOLIC BLOOD PRESSURE: 119 MMHG | HEART RATE: 63 BPM | OXYGEN SATURATION: 97 % | RESPIRATION RATE: 19 BRPM | TEMPERATURE: 97.7 F

## 2024-10-09 DIAGNOSIS — M25.551 RIGHT HIP PAIN: ICD-10-CM

## 2024-10-09 DIAGNOSIS — E55.9 VITAMIN D DEFICIENCY: Primary | ICD-10-CM

## 2024-10-09 DIAGNOSIS — R41.89 COGNITIVE DECLINE: ICD-10-CM

## 2024-10-09 DIAGNOSIS — R53.1 WEAKNESS: ICD-10-CM

## 2024-10-09 LAB
ALBUMIN SERPL BCG-MCNC: 4 G/DL (ref 3.5–5.2)
ALBUMIN UR-MCNC: 10 MG/DL
ALP SERPL-CCNC: 105 U/L (ref 40–150)
ALT SERPL W P-5'-P-CCNC: 21 U/L (ref 0–50)
ANION GAP SERPL CALCULATED.3IONS-SCNC: 14 MMOL/L (ref 7–15)
APPEARANCE UR: CLEAR
AST SERPL W P-5'-P-CCNC: 23 U/L (ref 0–45)
BASOPHILS # BLD AUTO: 0 10E3/UL (ref 0–0.2)
BASOPHILS NFR BLD AUTO: 1 %
BILIRUB SERPL-MCNC: 0.9 MG/DL
BILIRUB UR QL STRIP: NEGATIVE
BUN SERPL-MCNC: 15.6 MG/DL (ref 8–23)
CALCIUM SERPL-MCNC: 10.2 MG/DL (ref 8.8–10.4)
CHLORIDE SERPL-SCNC: 101 MMOL/L (ref 98–107)
COLOR UR AUTO: YELLOW
CREAT SERPL-MCNC: 1.29 MG/DL (ref 0.51–0.95)
EGFRCR SERPLBLD CKD-EPI 2021: 41 ML/MIN/1.73M2
EOSINOPHIL # BLD AUTO: 0 10E3/UL (ref 0–0.7)
EOSINOPHIL NFR BLD AUTO: 0 %
ERYTHROCYTE [DISTWIDTH] IN BLOOD BY AUTOMATED COUNT: 13.2 % (ref 10–15)
GLUCOSE SERPL-MCNC: 110 MG/DL (ref 70–99)
GLUCOSE UR STRIP-MCNC: NEGATIVE MG/DL
HCO3 SERPL-SCNC: 21 MMOL/L (ref 22–29)
HCT VFR BLD AUTO: 43.8 % (ref 35–47)
HGB BLD-MCNC: 14.8 G/DL (ref 11.7–15.7)
HGB UR QL STRIP: NEGATIVE
IMM GRANULOCYTES # BLD: 0 10E3/UL
IMM GRANULOCYTES NFR BLD: 1 %
KETONES UR STRIP-MCNC: NEGATIVE MG/DL
LEUKOCYTE ESTERASE UR QL STRIP: NEGATIVE
LYMPHOCYTES # BLD AUTO: 1.2 10E3/UL (ref 0.8–5.3)
LYMPHOCYTES NFR BLD AUTO: 17 %
MCH RBC QN AUTO: 31 PG (ref 26.5–33)
MCHC RBC AUTO-ENTMCNC: 33.8 G/DL (ref 31.5–36.5)
MCV RBC AUTO: 92 FL (ref 78–100)
MONOCYTES # BLD AUTO: 0.3 10E3/UL (ref 0–1.3)
MONOCYTES NFR BLD AUTO: 5 %
MUCOUS THREADS #/AREA URNS LPF: PRESENT /LPF
NEUTROPHILS # BLD AUTO: 5.1 10E3/UL (ref 1.6–8.3)
NEUTROPHILS NFR BLD AUTO: 76 %
NITRATE UR QL: NEGATIVE
NRBC # BLD AUTO: 0 10E3/UL
NRBC BLD AUTO-RTO: 0 /100
PH UR STRIP: 5.5 [PH] (ref 5–7)
PLATELET # BLD AUTO: 257 10E3/UL (ref 150–450)
POTASSIUM SERPL-SCNC: 5 MMOL/L (ref 3.4–5.3)
PROT SERPL-MCNC: 6.9 G/DL (ref 6.4–8.3)
RBC # BLD AUTO: 4.78 10E6/UL (ref 3.8–5.2)
RBC URINE: <1 /HPF
SODIUM SERPL-SCNC: 136 MMOL/L (ref 135–145)
SP GR UR STRIP: 1.02 (ref 1–1.03)
SQUAMOUS EPITHELIAL: <1 /HPF
UROBILINOGEN UR STRIP-MCNC: 2 MG/DL
WBC # BLD AUTO: 6.7 10E3/UL (ref 4–11)
WBC URINE: 1 /HPF

## 2024-10-09 PROCEDURE — 99283 EMERGENCY DEPT VISIT LOW MDM: CPT

## 2024-10-09 PROCEDURE — 85025 COMPLETE CBC W/AUTO DIFF WBC: CPT

## 2024-10-09 PROCEDURE — 82040 ASSAY OF SERUM ALBUMIN: CPT

## 2024-10-09 PROCEDURE — 81001 URINALYSIS AUTO W/SCOPE: CPT

## 2024-10-09 PROCEDURE — 36415 COLL VENOUS BLD VENIPUNCTURE: CPT

## 2024-10-09 ASSESSMENT — ACTIVITIES OF DAILY LIVING (ADL)
ADLS_ACUITY_SCORE: 35

## 2024-10-09 NOTE — ED TRIAGE NOTES
Pt BIB EMS, from home independently, with failure to thrive and fall. Pt has an unidentified issue with right leg, but pt declines to elaborate. Pt is not able to ambulate around the house. Pt was very resistant to come to the hospital.     PD arrived to scene and feces was found all around the house. Family assisted pt off the floor from the fall that occurred at an unknown time.     Pt's family is at bedside.

## 2024-10-09 NOTE — ED PROVIDER NOTES
Emergency Department Attending Supervision Note  10/9/2024  11:59 AM    I evaluated this patient in conjunction with an Advanced Practice Clinician:  Lamar Keith PA-C    Briefly, the patient presented with failure to thrive. EMS reports being called after the patient was unable to get off the toilet independently. Arline denies this occurring. She endorses having diarrhea, which she denies history of. Her daughter notes getting called a couple weeks ago with difficulty getting off the toilet. When he daughter went to her house at that time, she found diarrhea throughout the house. Upon examination, she denies hip pain, and she is ambulatory with a walker.  She presented to the ED for similar symptoms in August 2024. Of note, the patient reports difficulty hearing out  the left ear after she thinks a fly few into it.     Examination:   General: Patient is pleasant.  She is laughing at times.  There is no heike paranoid behavior other than the patient mentioning that we should not believe everything her daughter tells us.  The patient is already hinting at the desire to want to go back to her normal living environment.  She is very clear that she does not want any surgery or intervention on her right hip, if she notes that she might die on the table.  Head:  The scalp, face, and head appear normal  Eyes:  The pupils are normal    Conjunctivae and sclera appear normal  ENT:    The nose is normal    Ears/pinnae are normal  Lungs: Clear  CV:  Normal  GI:  The patient has a vertical incision in the right low abdomen from appendectomy and probable dermoid cyst resection.  Neck:  Normal range of motion  MS:  Normal.  The patient is not complaining of any pain in her right hip, but she has known bone-on-bone arthritis.  Skin:  No rash or lesions noted.  Neuro:  Speech is normal and fluent  Psych: Awake. Alert  Normal affect  No active hallucinations at this time.      External review of chart: The patient was seen on  8/12/24 in the ED by Dr. Trierweiler. She came in for chronic right hip pain. It was noted that she lives alone in a two bedroom house. She has screening labs including BMP which was normal except creatine 1.35, negative UA and  a normal CBC. She had a right ankle XR with no fracture and a right hip XR, demonstrating end stage Degenerative disease of the  right hip but no fracture.  She has slightly paranoid behavior with the attending physician and her daughter. There was some concern that she was unable to take care of herself. In the end, the patient was adamant that she wouldn't be admitted. Although some mild dementia was suspected, the doctor felt she had the capacity to understand the situation and make decision.     Medical Decision Making:    I have examined this patient's mental capacity, the ability to make an informed decision.    Arline Link has the ability to:    Understand relevant decisions,  Appreciate significance and consequences,  Weigh options and demonstrate reasoning, and   Express a choice    I should note that the patient refused to cooperate with the Mini-Mental status examination, we do believe that the patient likely has significant baseline cognitive impairment and could even have some mild paranoid features at times, mainly about the fact that she will be asked to leave her house.  The patient did ultimately see the  and was amenable to some home health services/interventions.    This patient presents to the emergency department with an episode of diarrhea and hip pain which is chronic.  She had a very similar visit in August when she was here in this emergency department and refused any additional services or interventions.  Her physical exam today is generally normal except there is likely some baseline cognitive decline which we could not formally quantitate.  Outpatient neuropsychological testing would be helpful.  The patient is not interested in placement in a  facility at this time.  Social work did convince the patient to allow some home health care services.  Otherwise, a potential vulnerable adult assessment should be conducted.  The patient does not have any life-threatening etiologies that need to be addressed at this time so she will ultimately be safe for discharge.        My impression/diagnosis is:      Chronic right hip pain and arthritis  Diarrhea  Probable cognitive decline        Dino Salas MD, CPE, FACEP, FAAEM  Attending Emergency Physician  [facility]       Dino Salas MD  10/09/24 [id number]

## 2024-10-09 NOTE — TELEPHONE ENCOUNTER
"Daughter calling to notify PCP that she is currently enroute to [facility] ED. Patient lives alone. Daughter visited today and found feces \"everywhere\". Daughter called 911 and police and paramedics came. RN heard conversation between paramedic and daughter who said he would make clear to ED staff that patient is unsafe to return to home in current condition. Daughter concerned about having safe discharge plan. Advised hospital staff/SW team will assist. No further questions per daughter.     FYI only for PCP    Dominga Burkett RN        "

## 2024-10-09 NOTE — ED PROVIDER NOTES
"  Emergency Department Note      History of Present Illness     Chief Complaint   Fall, failure to thrive, and Leg Pain      HPI   Arline Link is a 84 year old female with a past medical history of hyper cholesteremia and hypertension who presents to the emergency department today for evaluation of failure to thrive.  Per family report, this morning the patient was unable to get up off of the toilet without assistance.  Upon arrival, the daughter was able to get her up and into bed.  They noted feces smeared all over the bathroom.  The family reports that this has happened before in the past.  Has had to have the house professionally cleaned due to the amount of feces present.  In August, they were evaluated here in the ED as below.  Family expresses concern about the patient's ability to care for themselves.  The patient reports that she feels \"great\".  Denies pain.  States that she is eating \"okay\".  Does note some urinary frequency.  Patient denies right hip pain.  States that she does not want to be evaluated here in the ED.    Independent Historian   Daughter and son in law as detailed above.    Review of External Notes   ED note 8/12/2024: Patient evaluated for failure to thrive and right hip pain.  Laboratory evaluation unremarkable except for creatinine of 1.3.  Dr. Trierweiler expressed concerned about the patient being discharged home.  However felt that the patient did have decision-making capacity.  Ultimately the patient declined hospital admission.    Past Medical History     Medical History and Problem List   Past Medical History:   Diagnosis Date    Benign essential hypertension 2016    Elevated ALT measurement 2015    Hypercholesteremia     Hypothyroid 2012    Mucinosis of skin     Vitamin D deficiency        Medications   acetaminophen (TYLENOL) 325 MG tablet  losartan (COZAAR) 50 MG tablet  multivitamin w/minerals (THERA-VIT-M) tablet  SYNTHROID 50 MCG tablet  Vitamin D, Cholecalciferol, 1000 " units TABS        Surgical History   Past Surgical History:   Procedure Laterality Date    APPENDECTOMY  14    TONSILLECTOMY & ADENOIDECTOMY  5       Physical Exam     Patient Vitals for the past 24 hrs:   BP Temp Temp src Pulse Resp SpO2   10/09/24 1330 -- -- -- 86 19 --   10/09/24 1300 -- -- -- 84 19 --   10/09/24 1200 113/77 -- -- 95 17 --   10/09/24 1159 -- -- -- 100 -- --   10/09/24 1130 125/77 97.7  F (36.5  C) Oral 98 18 94 %     Physical Exam  General: Awake, alert, non-toxic.  Head:  Scalp is atraumatic.   Eyes:  Conjunctiva normal, PERRL  ENT:  The external nose and ears are normal.     Oropharynx clear, uvula midline. Cerumen impaction of left ear.   Neck:  Normal range of motion without rigidity.  CV:  Regular rate and rhythm    No pathologic murmur, rubs, or gallops.  Resp:  Breath sounds are clear bilaterally    Non-labored, no retractions or accessory muscle use  Abdomen: Abdomen is soft, no distension, no tenderness, no masses. Old scar on the RLQ abdomen.   MS:  No pain to palpation of the right hip. No lower extremity edema/swelling. No midline cervical, thoracic, or lumbar tenderness.  Extremities without joint swelling or redness.  Skin:  Warm and dry, No rash or lesions noted.  Neuro:  Alert and oriented.  GCS 15. Moves all extremities normal.  No facial asymmetry.   Psych: Awake. Flat affect. Occasionally paranoid. Laughing intermittently.     Diagnostics     Lab Results   Labs Ordered and Resulted from Time of ED Arrival to Time of ED Departure   COMPREHENSIVE METABOLIC PANEL - Abnormal       Result Value    Sodium 136      Potassium 5.0      Carbon Dioxide (CO2) 21 (*)     Anion Gap 14      Urea Nitrogen 15.6      Creatinine 1.29 (*)     GFR Estimate 41 (*)     Calcium 10.2      Chloride 101      Glucose 110 (*)     Alkaline Phosphatase 105      AST 23      ALT 21      Protein Total 6.9      Albumin 4.0      Bilirubin Total 0.9     ROUTINE UA WITH MICROSCOPIC REFLEX TO CULTURE - Abnormal     "Color Urine Yellow      Appearance Urine Clear      Glucose Urine Negative      Bilirubin Urine Negative      Ketones Urine Negative      Specific Gravity Urine 1.021      Blood Urine Negative      pH Urine 5.5      Protein Albumin Urine 10 (*)     Urobilinogen Urine 2.0      Nitrite Urine Negative      Leukocyte Esterase Urine Negative      Mucus Urine Present (*)     RBC Urine <1      WBC Urine 1      Squamous Epithelials Urine <1     CBC WITH PLATELETS AND DIFFERENTIAL    WBC Count 6.7      RBC Count 4.78      Hemoglobin 14.8      Hematocrit 43.8      MCV 92      MCH 31.0      MCHC 33.8      RDW 13.2      Platelet Count 257      % Neutrophils 76      % Lymphocytes 17      % Monocytes 5      % Eosinophils 0      % Basophils 1      % Immature Granulocytes 1      NRBCs per 100 WBC 0      Absolute Neutrophils 5.1      Absolute Lymphocytes 1.2      Absolute Monocytes 0.3      Absolute Eosinophils 0.0      Absolute Basophils 0.0      Absolute Immature Granulocytes 0.0      Absolute NRBCs 0.0         Imaging   No orders to display     Independent Interpretation   None    ED Course      Medications Administered   Medications - No data to display    Procedures   Procedures     Discussion of Management   Social Work, who came and evaluated the patient in the ED. Discussed home care options, patient ultimately agreeable to home health care including PT/OT.  The  was able to discuss this with the family who was agreeable to the plan.    ED Course   ED Course as of 10/09/24 1459   Wed Oct 09, 2024   1302 I attempted to administer a Mini-Mental status exam to the patient.  She became agitated and  yelled at me, told me that I was \"holding her against her will\".  And that when her primary care physician heard about this, \"I would get sued\".       Additional Documentation  None    Medical Decision Making / Diagnosis     CMS Diagnoses: None    MIPS       None    MDM   Arline Link is a 84 year old female who " presented to the emergency department today for evaluation of diarrhea and failure to thrive.  See HPI for further details.  On exam the patient is occasionally suspicious and paranoid of her daughter.  Daughter expresses concern about the patient's ability to live at home, noting diarrhea on the floor of the house, on 1 occasion she required a cleaning service to clean the place.  Patient denies pain or any additional medical concerns.  Screening lab work as above without evidence of electrolyte abnormality.  Her creatinine has actually improved from her last visit here in August.  Urinalysis without any evidence of infection.  Patient's abdomen is soft and nontender, given this I did not feel advanced imaging was necessary to further evaluate the diarrhea.  Patient did have evidence of cerumen impaction of her left ear, initial plan was for irrigation, however patient declined.  Discussed the case with social work, who was able to come and speak to the patient about home health services.  Patient seemed agreeable to this.  I did attempt to perform a Mini-Mental status exam on the patient's given suspicion of cognitive impairment.  When attempting to do so the patient became very agitated and refused participation in the exam. I do believe she has decision making capacity, she is able to verbalize and understand decisions. I did recommend that she come into the hospital.  I offered the patient admission for placement however she declined.  Stated that she is welcome to come to the ED for any concern at any time.  Family was updated regarding the plan.  Plan for the patient to be discharged home via EMS with family at her house upon arrival. Home care is expected within the next 24 hours. All questions answered.     Disposition   The patient was discharged.     Diagnosis     ICD-10-CM    1. Vitamin D deficiency  E55.9 Primary Care - Care Coordination Referral      2. Diarrhea  R19.7       3. Right hip pain  M25.551             JONATHAN Carpio Alexandra, PA-C  10/09/24 1523

## 2024-10-09 NOTE — ED NOTES
Bed: ED24  Expected date:   Expected time:   Means of arrival:   Comments:  Tasha 1 84 F fail to thrive leg pain eta 1123

## 2024-10-09 NOTE — DISCHARGE INSTRUCTIONS
You were seen today in the ED and your lab work looks good. A referral for home care was made to help you out at home. They will call you. Please return to the ED for any reason.    complete abx course  PO/IVF  recommend repeat renal US today

## 2024-10-09 NOTE — ED NOTES
EDT offered to irrigate left ear. Pt refused care. EDT spoke with Dr. Salas, and confirmed discontinuation of ear irrigation. Pt reported ability to hear from left ear.

## 2024-10-09 NOTE — PROGRESS NOTES
"SW Note:     Writer was called by LISE smith to assess patient for possible home care needs. Writer met with patient at bedside to discuss home care. Patient was very reluctant to accept help and was worried that her family \"was going to lock her up\" Writer explained that the patient is in need of more assistance in order to stay in her home. Patient understood this and was agreeable to a referral being sent to home care. Writer sent out the referral for home care and spoke with Nisha and they will have to out source as they cannot start service for a couple days.     Writer called patients daughter Ashleigh to discuss the home care referral. Ashleigh stated that she is open to whatever services she can get for her mom but is reluctant as she is afraid her mom will not follow through. Ashleigh stated that she is unable to get her mom in the house as she has stairs and she will need an ambulance ride. Writer called [facility] transport and arranged a stretcher transport for patient. PCS was filled out and sent.     Writer spoke with the patient and expressed the importance of participating in home care in order to stay in her home. Patient was very anxious with being in the hospital but expressed gratitude to writer for providing listening and support.     Writer sent home care orders to [facility]. They have a start of care date of 24-48 hours.       EDITH JIMENEZ, OZ  [facility]  INPATIENT CARE COORDINATION   "

## 2024-10-10 ENCOUNTER — PATIENT OUTREACH (OUTPATIENT)
Dept: CARE COORDINATION | Facility: CLINIC | Age: 84
End: 2024-10-10
Payer: MEDICARE

## 2024-10-10 ENCOUNTER — TELEPHONE (OUTPATIENT)
Dept: FAMILY MEDICINE | Facility: CLINIC | Age: 84
End: 2024-10-10
Payer: MEDICARE

## 2024-10-10 NOTE — LETTER
[facility] CARE COORDINATION  [address]  [place] [address]    October 10, 2024    Arline Link  [address]  [place] [address]      Dear Arline,    I am a clinic care coordinator who works with Gonzales Ayoub MD with the [facility]. I wanted to introduce myself and provide you with my contact information for you to be able to call me with any questions or concerns. Below is a description of clinic care coordination and how I can further assist you.       The clinic care coordination team is made up of a registered nurse, , financial resource worker and community health worker who understand the health care system. The goal of clinic care coordination is to help you manage your health and improve access to the health care system. Our team works alongside your provider to assist you in determining your health and social needs. We can help you obtain health care and community resources, providing you with necessary information and education. We can work with you through any barriers and develop a care plan that helps coordinate and strengthen the communication between you and your care team.  Our services are voluntary and are offered without charge to you personally.    Please feel free to contact me with any questions or concerns regarding care coordination and what we can offer.      We are focused on providing you with the highest-quality healthcare experience possible.    Sincerely,     Lydia Chairez, [facility]  Clinic Care Coordinator  [facility]  [phone]

## 2024-10-10 NOTE — TELEPHONE ENCOUNTER
Home Care is calling regarding an established patient with  [facility].        2/5/2024     1:15 PM   Home Care Information   Date of Home Care episode start 2/5/2024   Current following provider Dr. Ayoub   Date provider agreed to follow 1/30/2024    Name/Phone Number Marlena SYLVESTER, [phone]   Home Care agency [facility]     Requesting orders from: Gonzales Ayoub  Provider is following patient: Yes  Is this a 60-day recertification request?  No    Orders Requested    Skilled Nursing  Request for delay in care, service is not able to be provided within same scheduled day.   Patient was supposed to be seen by home care tomorrow. Care is delayed until [place] 10/13 due to staffing. PT, OT will see patient Monday or Tuesday.     Physical Therapy  Request for delay in care, service is not able to be provided within same scheduled day.   Delayed until Monday or Tuesday.     Occupational Therapy  Request for delay in care, service is not able to be provided within same scheduled day.   Delayed until Monday or Tuesday.     HHA (Home Health Aide)  Request for delay in care, service is not able to be provided within same scheduled day.   Following RN evaluation.       Confirmed ok to leave a detailed message with call back.  Contact information confirmed and updated as needed.    Natalee Arango RN

## 2024-10-10 NOTE — PROGRESS NOTES
Clinic Care Coordination Contact  [facility]/Voicemail    Clinical Data: Care Coordinator Outreach    Outreach Documentation Number of Outreach Attempt   10/10/2024   3:30 PM 1       Left message on patient's voicemail with call back information and requested return call.    Plan: Care Coordinator will send care coordination introduction letter with care coordinator contact information and explanation of care coordination services via TMS. Care Coordinator will try to reach patient again in 1-2 business days.    Lydia Chairez,  [facility]  Clinic Care Coordinator  [facility]  [phone]  [email or web address]

## 2024-10-11 NOTE — PROGRESS NOTES
Clinic Care Coordination Contact  [facility]/Voicemail    Clinical Data: Care Coordinator Outreach    Outreach Documentation Number of Outreach Attempt   10/10/2024   3:30 PM 1   10/11/2024   3:54 PM 2       Left message on patient's voicemail with call back information and requested return call.    Plan: Care Coordinator sent care coordination introduction letter on 10/10/24 via [employer]. Care Coordinator will do no further outreaches at this time.    Lydia Chairez,  [facility]  Clinic Care Coordinator  [facility]  [phone]  [email or web address]

## 2024-11-11 DIAGNOSIS — I10 BENIGN ESSENTIAL HYPERTENSION: ICD-10-CM

## 2024-11-12 RX ORDER — LOSARTAN POTASSIUM 50 MG/1
50 TABLET ORAL DAILY
Qty: 90 TABLET | Refills: 0 | Status: SHIPPED | OUTPATIENT
Start: 2024-11-12

## 2024-12-22 ENCOUNTER — APPOINTMENT (OUTPATIENT)
Dept: GENERAL RADIOLOGY | Facility: CLINIC | Age: 84
End: 2024-12-22
Attending: EMERGENCY MEDICINE
Payer: MEDICARE

## 2024-12-22 ENCOUNTER — HOSPITAL ENCOUNTER (EMERGENCY)
Facility: CLINIC | Age: 84
Discharge: HOME | End: 2024-12-22
Attending: EMERGENCY MEDICINE
Payer: MEDICARE

## 2024-12-22 VITALS
HEART RATE: 101 BPM | OXYGEN SATURATION: 97 % | BODY MASS INDEX: 26.57 KG/M2 | HEIGHT: 63 IN | RESPIRATION RATE: 20 BRPM | TEMPERATURE: 98.1 F | SYSTOLIC BLOOD PRESSURE: 143 MMHG | DIASTOLIC BLOOD PRESSURE: 110 MMHG

## 2024-12-22 DIAGNOSIS — M79.604 RIGHT LEG PAIN: ICD-10-CM

## 2024-12-22 DIAGNOSIS — R41.89 COGNITIVE IMPAIRMENT: ICD-10-CM
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
## 2024-12-22 PROCEDURE — 73560 X-RAY EXAM OF KNEE 1 OR 2: CPT | Mod: RT

## 2024-12-22 PROCEDURE — 73610 X-RAY EXAM OF ANKLE: CPT | Mod: RT

## 2024-12-22 PROCEDURE — 73590 X-RAY EXAM OF LOWER LEG: CPT | Mod: RT

## 2024-12-22 PROCEDURE — 73502 X-RAY EXAM HIP UNI 2-3 VIEWS: CPT

## 2024-12-22 PROCEDURE — 99283 EMERGENCY DEPT VISIT LOW MDM: CPT

## 2024-12-22 ASSESSMENT — ACTIVITIES OF DAILY LIVING (ADL)
ADLS_ACUITY_SCORE: 41

## 2024-12-22 ASSESSMENT — COLUMBIA-SUICIDE SEVERITY RATING SCALE - C-SSRS
6. HAVE YOU EVER DONE ANYTHING, STARTED TO DO ANYTHING, OR PREPARED TO DO ANYTHING TO END YOUR LIFE?: NO
2. HAVE YOU ACTUALLY HAD ANY THOUGHTS OF KILLING YOURSELF IN THE PAST MONTH?: NO
1. IN THE PAST MONTH, HAVE YOU WISHED YOU WERE DEAD OR WISHED YOU COULD GO TO SLEEP AND NOT WAKE UP?: NO

## 2024-12-22 NOTE — ED PROVIDER NOTES
"  Emergency Department Note      History of Present Illness     Chief Complaint   Fall      HPI   Arline Link is a 84 year old female with a history of hyperlipidemia and hypertension who presents to the ED via EMS from home for evaluation of right ankle pain due to a fall. The patient states she was leaning over a wooden chest and when moving over to her walker, tripped and fell. She now has right ankle pain as well as posterior thigh pain, both worse with movement. States she was unable to ambulate after the fall. She uses a walker and lives independently at home at baseline. Denies head injury, loss of consciousness, or abdominal pain.     Independent Historian   None    Review of External Notes   N/A    Past Medical History     Medical History and Problem List   Hypothyroidism  HLD  HTN    Medications   Losartan  Synthroid     Surgical History   Appendectomy  Tonsillectomy  Adenoidectomy     Physical Exam     Patient Vitals for the past 24 hrs:   BP Temp Temp src Pulse Resp SpO2 Height   12/22/24 1300 (!) 143/110 -- -- 101 -- -- --   12/22/24 0900 (!) 161/104 -- -- 71 -- 97 % --   12/22/24 0852 (!) 162/110 98.1  F (36.7  C) Oral 102 20 97 % 1.6 m (5' 3\")     Physical Exam  General: Alert and cooperative with exam. Patient in mild distress.  Baseline mentation.  Head:  Scalp is NC/AT  Eyes:  No scleral icterus, PERRL, EOMI  ENT:  The external nose and ears are normal. The oropharynx is normal and without erythema; mucus membranes are moist. Uvula midline, no evidence of deep space infection.  Neck:  Normal range of motion without rigidity.  CV:  Regular rate and rhythm  Resp:  Breath sounds are clear bilaterally    Non-labored, no retractions or accessory muscle use  GI:  Abdomen is soft, no distension, no tenderness. No peritoneal signs  MS:  No lower extremity edema.   RLE:   CMS intact though limited ROM of knee, hip, and ankle. Pes cavus deformity of the foot.   LLE:  Normal.  Skin:  Warm and dry, No rash " or lesions noted.  Neuro: Oriented x 3. No gross motor deficits.    Diagnostics     Lab Results   Labs Ordered and Resulted from Time of ED Arrival to Time of ED Departure - No data to display    Imaging   XR Pelvis w Hip Right 1 View   Final Result   IMPRESSION: Diffuse demineralization without displaced fracture. 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 Lower lumbar degenerative disc disease.      Ankle XR, G/E 3 views, right   Final Result   IMPRESSION: Demineralization without displaced fracture. No right knee joint effusion. Moderate to severe medial compartment predominant right knee osteoarthritis. Plantar calcaneal spur.      XR Tibia and Fibula Right 2 Views   Final Result   IMPRESSION: Demineralization without displaced fracture. No right knee joint effusion. Moderate to severe medial compartment predominant right knee osteoarthritis. Plantar calcaneal spur.      XR Knee Right 1/2 Views   Final Result   IMPRESSION: Demineralization without displaced fracture. No right knee joint effusion. Moderate to severe medial compartment predominant right knee osteoarthritis. Plantar calcaneal spur.        Independent Interpretation   X-ray right pelvis, ankle, knee, tib/fib shows no evidence of fracture or dislocation    ED Course      Medications Administered   Medications - No data to display    Procedures   Procedures     Discussion of Management   None    ED Course   ED Course as of 12/22/24 1908   Sun Dec 22, 2024   0910 I obtained history and performed a physical exam as noted above.    1104 I spoke with the patient's daughter regarding the patient's presentation and plan of care.       Additional Documentation  None    Medical Decision Making / Diagnosis     CMS Diagnoses: None    MIPS       None    [facility]   Arline Link is a 84 year old female who presents with fall and associated right leg/right ankle pain.  Patient's medical history and records reviewed.  On evaluation there is no  evidence of significant trauma on exam.  Imaging of the right leg was obtained as noted above and without evidence of fracture or dislocation.  Patient was able to stand and ambulate with use of walker at baseline and there is low clinical suspicion for occult fracture.  Head to toe trauma exam was otherwise unremarkable.  Patient is at her baseline mentation.  No indication for admission at this time.  In discussing patient's presentation with her daughter, there is concern for ongoing cognitive impairment/developing dementia.  I did recommend close follow-up with her PCP.  Supportive care and return precautions were discussed.  Patient was discharged back to her home via EMS given baseline mobility constraints and need for assist of 2.    Disposition   The patient was discharged.     Diagnosis     ICD-10-CM    1. Fall, initial encounter  W19.XXXA       2. Right leg pain  M79.604       3. Cognitive impairment  R41.89            Discharge Medications   Discharge Medication List as of 12/22/2024 12:19 PM            Scribe Disclosure:  Alysia BARILLAS, am serving as a scribe at 9:06 AM on 12/22/2024 to document services personally performed by Awais Henderson DO based on my observations and the provider's statements to me.        Awais Henderson DO  12/22/24 1913

## 2024-12-22 NOTE — ED TRIAGE NOTES
Lives at home alone. Slid out of bed. Has rt ankle pain. Family has concern for dementia.     Triage Assessment (Adult)       Row Name 12/22/24 0854          Triage Assessment    Airway WDL WDL        Respiratory WDL    Respiratory WDL WDL        Cardiac WDL    Cardiac WDL WDL        Peripheral/Neurovascular WDL    Peripheral Neurovascular WDL X        Cognitive/Neuro/Behavioral WDL    Cognitive/Neuro/Behavioral WDL X     Mood/Behavior anxious

## 2024-12-22 NOTE — ED NOTES
Spoke with daughter Ashleigh regarding timing for transportation to bring pt home. Her  will be at the house.

## 2024-12-22 NOTE — ED NOTES
Bed: ED28  Expected date:   Expected time:   Means of arrival:   Comments:  Tasha [age] F Fall/Dementia/Ankle Pain

## 2024-12-23 ENCOUNTER — PATIENT OUTREACH (OUTPATIENT)
Dept: CARE COORDINATION | Facility: CLINIC | Age: 84
End: 2024-12-23
Payer: MEDICARE

## 2024-12-23 NOTE — PROGRESS NOTES
Clinic Care Coordination Contact  Community Health Worker Initial Outreach    CHW Initial Information Gathering:  Referral Source: ED Follow-Up  Current living arrangement:: Not Assessed  CHW Additional Questions  If ED/Hospital discharge, follow-up appointment scheduled as recommended?: No  Patient agreeable to assistance with scheduling?: No  Patient declined (specify): Ashleigh expressed that she will do so at a later time. Further indicating that she needs to follow up with Patient first.  Medication changes made following ED/Hospital discharge?: No  MyChart active?: Yes  Patient sent Social Drivers of Health questionnaire?: No    Patient accepts CC: No, Patient's Daughter (Ashleigh) expressed no additional support is needed at this time. Patient will be sent Care Coordination introduction letter for future reference.     Saundra Grimes  Community Health Worker    Connected Care Resources   [facility]     *Connected Care Resources Team does NOT   follow patient ongoing. Referrals are identified   based on internal discharge report and the outreach is to ensure   Patient has understanding of their discharge instructions.

## 2024-12-23 NOTE — LETTER
Arline Link  [address] [address]    Dear Arline Link,    I am a team member within the Connected Care Resource Center with [facility]. I recently contacted you to ensure you are doing well following a visit within our health system. I also wanted to take this chance to introduce Clinic Care Coordination should you have any interest in this program in the future.    Below is a description of Clinic Care Coordination and how this team can further assist you:       The Clinic Care Coordination team is made up of a Registered Nurse, , Financial Resource Worker, and a Community Health Worker who understand and can help navigate the health care system. The goal of clinic care coordination is to help you manage your health, improve access to care, and achieve optimal health outcomes. They work alongside your provider to assist you in determining your health and social needs, obtain health care and community resources, and provide you with necessary information and education. Clinic Care Coordination can work with you through any barriers and develop a care plan that helps coordinate and strengthen the relationship between you and your care team.    If you wish to connect with the Clinic Care Coordination Team, please let your [facility] Primary Care Provider or Clinic Care Team know and they can place a referral. The Clinic Care Coordination team will then reach out by phone to further support you.    We are focused on providing you with the highest-quality healthcare experience possible.    Sincerely,   Saundra HOWARD  Community Health Worker    Connected Care Resources   [facility] [address] ([phone]).     show

## 2025-01-12 ENCOUNTER — APPOINTMENT (OUTPATIENT)
Dept: GENERAL RADIOLOGY | Facility: CLINIC | Age: 85
End: 2025-01-12
Attending: EMERGENCY MEDICINE
Payer: MEDICARE

## 2025-01-12 ENCOUNTER — APPOINTMENT (OUTPATIENT)
Dept: PHYSICAL THERAPY | Facility: CLINIC | Age: 85
End: 2025-01-12
Attending: EMERGENCY MEDICINE
Payer: MEDICARE

## 2025-01-12 ENCOUNTER — HOSPITAL ENCOUNTER (INPATIENT)
Facility: CLINIC | Age: 85
End: 2025-01-12
Attending: EMERGENCY MEDICINE | Admitting: INTERNAL MEDICINE
Payer: MEDICARE

## 2025-01-12 ENCOUNTER — APPOINTMENT (OUTPATIENT)
Dept: CT IMAGING | Facility: CLINIC | Age: 85
End: 2025-01-12
Attending: EMERGENCY MEDICINE
Payer: MEDICARE

## 2025-01-12 DIAGNOSIS — Z86.59 HISTORY OF DEMENTIA: ICD-10-CM

## 2025-01-12 DIAGNOSIS — R29.6 FALLS FREQUENTLY: ICD-10-CM

## 2025-01-12 DIAGNOSIS — R53.1 WEAKNESS GENERALIZED: ICD-10-CM

## 2025-01-12 DIAGNOSIS — M25.461 EFFUSION OF RIGHT KNEE: ICD-10-CM

## 2025-01-12 DIAGNOSIS — S82.141A CLOSED FRACTURE OF RIGHT TIBIAL PLATEAU, INITIAL ENCOUNTER: Primary | ICD-10-CM

## 2025-01-12 LAB
ANION GAP SERPL CALCULATED.3IONS-SCNC: 12 MMOL/L (ref 7–15)
BASOPHILS # BLD AUTO: 0.1 10E3/UL (ref 0–0.2)
BASOPHILS NFR BLD AUTO: 1 %
BUN SERPL-MCNC: 14.6 MG/DL (ref 8–23)
CALCIUM SERPL-MCNC: 10.1 MG/DL (ref 8.8–10.4)
CHLORIDE SERPL-SCNC: 102 MMOL/L (ref 98–107)
CREAT SERPL-MCNC: 1.21 MG/DL (ref 0.51–0.95)
EGFRCR SERPLBLD CKD-EPI 2021: 44 ML/MIN/1.73M2
EOSINOPHIL # BLD AUTO: 0 10E3/UL (ref 0–0.7)
EOSINOPHIL NFR BLD AUTO: 1 %
ERYTHROCYTE [DISTWIDTH] IN BLOOD BY AUTOMATED COUNT: 13.7 % (ref 10–15)
GLUCOSE SERPL-MCNC: 117 MG/DL (ref 70–99)
HCO3 SERPL-SCNC: 22 MMOL/L (ref 22–29)
HCT VFR BLD AUTO: 44.8 % (ref 35–47)
HGB BLD-MCNC: 15.1 G/DL (ref 11.7–15.7)
IMM GRANULOCYTES # BLD: 0.1 10E3/UL
IMM GRANULOCYTES NFR BLD: 1 %
LYMPHOCYTES # BLD AUTO: 1.3 10E3/UL (ref 0.8–5.3)
LYMPHOCYTES NFR BLD AUTO: 18 %
MCH RBC QN AUTO: 31.1 PG (ref 26.5–33)
MCHC RBC AUTO-ENTMCNC: 33.7 G/DL (ref 31.5–36.5)
MCV RBC AUTO: 92 FL (ref 78–100)
MONOCYTES # BLD AUTO: 0.4 10E3/UL (ref 0–1.3)
MONOCYTES NFR BLD AUTO: 5 %
NEUTROPHILS # BLD AUTO: 5.5 10E3/UL (ref 1.6–8.3)
NEUTROPHILS NFR BLD AUTO: 75 %
NRBC # BLD AUTO: 0 10E3/UL
NRBC BLD AUTO-RTO: 0 /100
PLATELET # BLD AUTO: 271 10E3/UL (ref 150–450)
POTASSIUM SERPL-SCNC: 4.6 MMOL/L (ref 3.4–5.3)
RBC # BLD AUTO: 4.86 10E6/UL (ref 3.8–5.2)
SODIUM SERPL-SCNC: 136 MMOL/L (ref 135–145)
TSH SERPL DL<=0.005 MIU/L-ACNC: 2.72 UIU/ML (ref 0.3–4.2)
WBC # BLD AUTO: 7.4 10E3/UL (ref 4–11)

## 2025-01-12 PROCEDURE — 80048 BASIC METABOLIC PNL TOTAL CA: CPT | Performed by: EMERGENCY MEDICINE

## 2025-01-12 PROCEDURE — 97530 THERAPEUTIC ACTIVITIES: CPT | Mod: GP

## 2025-01-12 PROCEDURE — 36415 COLL VENOUS BLD VENIPUNCTURE: CPT | Performed by: EMERGENCY MEDICINE

## 2025-01-12 PROCEDURE — 99285 EMERGENCY DEPT VISIT HI MDM: CPT | Mod: 25

## 2025-01-12 PROCEDURE — 250N000013 HC RX MED GY IP 250 OP 250 PS 637: Performed by: EMERGENCY MEDICINE

## 2025-01-12 PROCEDURE — 85004 AUTOMATED DIFF WBC COUNT: CPT | Performed by: EMERGENCY MEDICINE

## 2025-01-12 PROCEDURE — 73562 X-RAY EXAM OF KNEE 3: CPT | Mod: RT

## 2025-01-12 PROCEDURE — 250N000011 HC RX IP 250 OP 636: Performed by: EMERGENCY MEDICINE

## 2025-01-12 PROCEDURE — 120N000001 HC R&B MED SURG/OB

## 2025-01-12 PROCEDURE — 250N000013 HC RX MED GY IP 250 OP 250 PS 637: Performed by: INTERNAL MEDICINE

## 2025-01-12 PROCEDURE — 96376 TX/PRO/DX INJ SAME DRUG ADON: CPT

## 2025-01-12 PROCEDURE — 85018 HEMOGLOBIN: CPT | Performed by: EMERGENCY MEDICINE

## 2025-01-12 PROCEDURE — 99223 1ST HOSP IP/OBS HIGH 75: CPT | Mod: AI | Performed by: INTERNAL MEDICINE

## 2025-01-12 PROCEDURE — 73700 CT LOWER EXTREMITY W/O DYE: CPT | Mod: RT

## 2025-01-12 PROCEDURE — 82310 ASSAY OF CALCIUM: CPT | Performed by: EMERGENCY MEDICINE

## 2025-01-12 PROCEDURE — 84443 ASSAY THYROID STIM HORMONE: CPT | Performed by: INTERNAL MEDICINE

## 2025-01-12 PROCEDURE — 97161 PT EVAL LOW COMPLEX 20 MIN: CPT | Mod: GP

## 2025-01-12 PROCEDURE — 96374 THER/PROPH/DIAG INJ IV PUSH: CPT

## 2025-01-12 PROCEDURE — 73590 X-RAY EXAM OF LOWER LEG: CPT | Mod: RT

## 2025-01-12 RX ORDER — NALOXONE HYDROCHLORIDE 0.4 MG/ML
0.4 INJECTION, SOLUTION INTRAMUSCULAR; INTRAVENOUS; SUBCUTANEOUS
Status: DISCONTINUED | OUTPATIENT
Start: 2025-01-12 | End: 2025-02-03 | Stop reason: HOSPADM

## 2025-01-12 RX ORDER — AMOXICILLIN 250 MG
2 CAPSULE ORAL 2 TIMES DAILY PRN
Status: DISCONTINUED | OUTPATIENT
Start: 2025-01-12 | End: 2025-02-03 | Stop reason: HOSPADM

## 2025-01-12 RX ORDER — AMOXICILLIN 250 MG
1 CAPSULE ORAL 2 TIMES DAILY PRN
Status: DISCONTINUED | OUTPATIENT
Start: 2025-01-12 | End: 2025-02-03 | Stop reason: HOSPADM

## 2025-01-12 RX ORDER — NALOXONE HYDROCHLORIDE 0.4 MG/ML
0.2 INJECTION, SOLUTION INTRAMUSCULAR; INTRAVENOUS; SUBCUTANEOUS
Status: DISCONTINUED | OUTPATIENT
Start: 2025-01-12 | End: 2025-02-03 | Stop reason: HOSPADM

## 2025-01-12 RX ORDER — CALCIUM CARBONATE 500 MG/1
1000 TABLET, CHEWABLE ORAL 4 TIMES DAILY PRN
Status: DISCONTINUED | OUTPATIENT
Start: 2025-01-12 | End: 2025-02-03 | Stop reason: HOSPADM

## 2025-01-12 RX ORDER — ACETAMINOPHEN 325 MG/1
650 TABLET ORAL EVERY 6 HOURS
Status: DISCONTINUED | OUTPATIENT
Start: 2025-01-12 | End: 2025-01-27

## 2025-01-12 RX ORDER — ACETAMINOPHEN 500 MG
1000 TABLET ORAL ONCE
Status: COMPLETED | OUTPATIENT
Start: 2025-01-12 | End: 2025-01-12

## 2025-01-12 RX ORDER — LEVOTHYROXINE SODIUM 50 UG/1
50 TABLET ORAL DAILY
Status: DISCONTINUED | OUTPATIENT
Start: 2025-01-13 | End: 2025-02-03 | Stop reason: HOSPADM

## 2025-01-12 RX ORDER — OXYCODONE HYDROCHLORIDE 5 MG/1
5 TABLET ORAL ONCE
Status: COMPLETED | OUTPATIENT
Start: 2025-01-12 | End: 2025-01-12

## 2025-01-12 RX ORDER — HALOPERIDOL 5 MG/ML
2 INJECTION INTRAMUSCULAR ONCE
Status: COMPLETED | OUTPATIENT
Start: 2025-01-12 | End: 2025-01-12

## 2025-01-12 RX ORDER — HALOPERIDOL 2 MG/1
2 TABLET ORAL EVERY 6 HOURS PRN
Status: DISCONTINUED | OUTPATIENT
Start: 2025-01-12 | End: 2025-01-13

## 2025-01-12 RX ORDER — OXYCODONE HYDROCHLORIDE 5 MG/1
5 TABLET ORAL EVERY 6 HOURS PRN
Status: DISCONTINUED | OUTPATIENT
Start: 2025-01-12 | End: 2025-02-03 | Stop reason: HOSPADM

## 2025-01-12 RX ORDER — LIDOCAINE 40 MG/G
CREAM TOPICAL
Status: DISCONTINUED | OUTPATIENT
Start: 2025-01-12 | End: 2025-02-03 | Stop reason: HOSPADM

## 2025-01-12 RX ADMIN — HALOPERIDOL LACTATE 2 MG: 5 INJECTION, SOLUTION INTRAMUSCULAR at 14:36

## 2025-01-12 RX ADMIN — HALOPERIDOL LACTATE 2 MG: 5 INJECTION, SOLUTION INTRAMUSCULAR at 12:40

## 2025-01-12 RX ADMIN — ACETAMINOPHEN 1000 MG: 500 TABLET, FILM COATED ORAL at 08:59

## 2025-01-12 RX ADMIN — ACETAMINOPHEN 650 MG: 325 TABLET, FILM COATED ORAL at 21:27

## 2025-01-12 RX ADMIN — OXYCODONE HYDROCHLORIDE 5 MG: 5 TABLET ORAL at 14:36

## 2025-01-12 ASSESSMENT — ACTIVITIES OF DAILY LIVING (ADL)
ADLS_ACUITY_SCORE: 41
ADLS_ACUITY_SCORE: 23
ADLS_ACUITY_SCORE: 41
ADLS_ACUITY_SCORE: 41
ADLS_ACUITY_SCORE: 25
ADLS_ACUITY_SCORE: 41
ADLS_ACUITY_SCORE: 41
DEPENDENT_IADLS:: CLEANING;COOKING;LAUNDRY;MEAL PREPARATION;SHOPPING;MEDICATION MANAGEMENT;MONEY MANAGEMENT;TRANSPORTATION;INCONTINENCE
ADLS_ACUITY_SCORE: 41

## 2025-01-12 NOTE — ED NOTES
Patient puts on call light every 5 minutes, requesting to be adjusted in bed, multiple attempts done by multiple staffs but unable to help patient to find comfortable spots. Refused pain medications when offered.     normal...

## 2025-01-12 NOTE — PROGRESS NOTES
"   01/12/25 1215   Appointment Info   Signing Clinician's Name / Credentials (PT) Zachary De Jesus, PT, DPT       Present no   Living Environment   People in Home alone   Current Living Arrangements house   Home Accessibility stairs to enter home   Number of Stairs, Main Entrance 3   Stair Railings, Main Entrance railings safe and in good condition   Transportation Anticipated family or friend will provide   Living Environment Comments Pt lives alone in a house with 3 AMRIT. Pt stays on main level. Family provides transportation.   Self-Care   Usual Activity Tolerance fair   Current Activity Tolerance poor   Regular Exercise No   Equipment Currently Used at Home grab bar, toilet;grab bar, tub/shower;walker, rolling   Fall history within last six months yes   Number of times patient has fallen within last six months 1   Activity/Exercise/Self-Care Comment Patient reporting 1 recent fall, but appears to be a very unreliable historian. Patient reporting mod I at baseline with use of FWW. Does not leave the house often. Per  note, patient has visiting angels 3x/wk and is in bed 23 hours per day.   General Information   Onset of Illness/Injury or Date of Surgery 01/12/25   Referring Physician Dino Salas MD   Patient/Family Therapy Goals Statement (PT) reduce R hip pain   Pertinent History of Current Problem (include personal factors and/or comorbidities that impact the POC) Per chart review, \"Arline Link is a 84 year old female presents to the emergency department after a slight fall earlier today.  The patient lives independently in a house.\"   Existing Precautions/Restrictions fall   Cognition   Affect/Mental Status (Cognition) agitated;confused   Orientation Status (Cognition) oriented x 3   Follows Commands (Cognition) WFL   Cognitive Status Comments Pt poor historian with heavy fluctuation in mentation and cooperation. Very frustrated with care provided in ED. Asking frequently about " location of her walker.   Integumentary/Edema   Integumentary/Edema other (describe)   Integumentary/Edema Comments mild edema noted in BLE   Posture    Posture Forward head position;Protracted shoulders   Range of Motion (ROM)   Range of Motion ROM deficits secondary to pain   ROM Comment RLE AROM limited 2/2 pain in posterior thigh   Strength (Manual Muscle Testing)   Strength (Manual Muscle Testing) Deficits observed during functional mobility   Strength Comments moderate functional strength deficits   Bed Mobility   Comment, (Bed Mobility) mod A x2 supine>sit   Transfers   Comment, (Transfers) mod A sit>stand w/ FWW   Gait/Stairs (Locomotion)   Comment, (Gait/Stairs) pt unable to ambulate 2/2 pain and weakness   Balance   Balance Comments Impaired dynamic balance   Sensory Examination   Sensory Perception patient reports no sensory changes   Clinical Impression   Criteria for Skilled Therapeutic Intervention Yes, treatment indicated   PT Diagnosis (PT) Impaired functional mobility   Influenced by the following impairments impaired strength, balance, activity tolerance, pain   Functional limitations due to impairments limited IND with mobility   Clinical Presentation (PT Evaluation Complexity) evolving   Clinical Presentation Rationale clinical judgement, pending medical workup   Clinical Decision Making (Complexity) low complexity   Planned Therapy Interventions (PT) balance training;bed mobility training;gait training;home exercise program;neuromuscular re-education;patient/family education;ROM (range of motion);stair training;strengthening;transfer training;progressive activity/exercise;risk factor education;home program guidelines   Risk & Benefits of therapy have been explained evaluation/treatment results reviewed;care plan/treatment goals reviewed;risks/benefits reviewed;current/potential barriers reviewed;participants voiced agreement with care plan;participants included;patient   PT Total Evaluation Time    PT Eval, Low Complexity Minutes (97410) 5   Physical Therapy Goals   PT Frequency 3x/week   PT Predicted Duration/Target Date for Goal Attainment 01/26/25   PT Goals Bed Mobility;Transfers;Gait;Stairs   PT: Bed Mobility Independent;Supine to/from sit   PT: Transfers Modified independent;Sit to/from stand;Bed to/from chair;Assistive device   PT: Gait Modified independent;Assistive device;50 feet   PT: Stairs Supervision/stand-by assist;3 stairs;Rail on both sides   Interventions   Interventions Quick Adds Therapeutic Activity   Therapeutic Activity   Therapeutic Activities: dynamic activities to improve functional performance Minutes (84353) 16   Symptoms Noted During/After Treatment Fatigue;Increased pain   Treatment Detail/Skilled Intervention Greeted pt upon arrival to room. Pt agreeable to working with PT. Supine>sit w/ mod A x2. Cueing and encouragement throughout for patient to complete as much of the transition under their own power as they can. Pt reporting high levels of fear with mobility. Sit<>stand x2 w/ FWW mod A. Cueing for safe and efficient sit<>stand procedure including scooting to the front edge of the chair, to lean forward with nose over toes, and hand and foot placement. Noting limited standing tolerance with high levels of R hip/posterior thigh pain. Sit>supine w/ mod A x2. Cueing and encouragement throughout for patient to complete as much of the transition under their own power as they can. RN and SW updated.   PT Discharge Planning   PT Plan out of bed mobility as able, sit<>stands w/ FWW, initiate gait as able w/ FWW   PT Discharge Recommendation (DC Rec) Transitional Care Facility   PT Rationale for DC Rec Pt is below baseline. Pt currently requiring assist of 1-2 with all functional mobility. Pt presents with deficits in activity tolerance, balance, and strength with high pain levels in RLE, limiting mobility. Due to these deficits, pt is a high falls risk and unsafe to return home at  this time, as pt lives alone. Pt would benefit from continued skilled PT services via TCU to address deficits and improve IND with safety and functional mobility in order to return to PLOF. Based on chart review, patient appears to be failing to thrive at home and may ultimately be most appropriate for LTC placement of some kind following rehab stay to improve mobility levels.   PT Brief overview of current status A x2 w/ FWW: Goals of therapy will be to address safe mobility and make recs for d/c to next level of care. Pt and RN will continue to follow all falls risk precautions as documented by RN staff while hospitalized   PT Total Distance Amb During Session (feet) 0   Physical Therapy Time and Intention   Timed Code Treatment Minutes 16   Total Session Time (sum of timed and untimed services) 21

## 2025-01-12 NOTE — H&P
[facility]    History and Physical - Hospitalist Service       Date of Admission:  1/12/2025    Assessment & Plan      Arline Link is a 84 year old female history of hypertension, vitamin D deficiency, hypothyroidism, cognitive impairment, recurrent falls, who presents to the emergency room with complaints of right leg pain with a history of recurrent falls in her home on 1/12/2025    Acute nondisplaced tibial plateau fracture  History of multiple falls prior to admission   Seen with complaints of right leg pain.   Xray with cortical irregularity posterior aspect tibial plateaus on lateral view indeterminant.  More likely acute.  No joint effusion  CT right knee showing acute nondisplaced tibial plateau fracture most pronounced laterally where there is mild impaction along the posterior lateral tibial plateau  Discussion with the family and we will for start with scheduled Tylenol and then use as needed oxycodone to avoid increasing risk of delirium with opioid  Patient does appear to have a component of anxiety in the ER as well  Orthopedic consult placed: N.p.o. after midnight    Recurrent Falls   Patient has a history of recurrent falls prior to admission.  Family has a camera in her home and reportedly she has multiple falls reported.  Patient has been seen recurrent in the ER for falls and weakness.  PT/OT/case management  Patient will require TCU placement and need for alternative living situation  Patient signed out with diagnosis of a right tibial plateau fracture but unable to ambulate given the level of her pain so we will reassess for any other inadvertent injuries with therapy: She is very agitated in the ER and would not be able to cooperate with an assessment for    Neurocognitive impairment with behavioral disturbances.   Suspected advanced underlying dementia  Patient has had progressive neurocognitive impairment  Patient is quite abrasive in the ED  This has been noted  in the past on her visits.  Reported by ER provider as well.  Patient gets quite angry and demanding  As noted in prior clinic visits she was noted to be quite sarcastic and not answering questions very well.  Fixed thoughts.  Minimizing things.  Has Haldol as needed currently  This was discussed at length with the family in the ER including her daughter and granddaughter.  Suspect advanced underlying dementia  Will order OT evaluation but suspect patient may not cooperate  1/12 patient will get OT ordered but anticipate she will require placement longer-term and unable to return home safely  Currently Haldol 2 mg every 6 as needed for agitation  Anticipate she will be high risk for increasing escalating behaviors in the hospital >> May require psych consult as well    Hypertension  Patient reportedly on Cozaar 50 mg prior to admission but it is really unclear whether she is taking it  Will for start with observing her blood pressure and determine if indicated    Hypothyroidism  On Synthroid 50 mics prior to admission  Again uncertain if she is actually taking the medication.  Check TSH and order Synthroid and adjust as indicated    Discharge planning:   First await input from orthopedics further surgery is indicated.  PT OT and case management.  TCU at minimal with likely alternative longer-term placement.  Family near the [facility] and would like to have her go to a care facility in that area if possible     ADDENDUM: discussion with daughter and granddaughter in the ER and daughter is very aware of her falls and ability to be at home. Feels cognitive status have really declined         Diet: Regular Diet Adult    DVT Prophylaxis: Pneumatic Compression Devices ADDENDUM: DVT prevention per orthopedics. Await consult ? If need to have surgery or non operative and then DVT recommendations.   Souza Catheter: Not present  Lines: None     Cardiac Monitoring: None  Code Status:  Currently full code recommended further  "discussion with the family    Clinically Significant Risk Factors Present on Admission                   # Hypertension: Noted on problem list           # Overweight: Estimated body mass index is 26.57 kg/m  as calculated from the following:    Height as of this encounter: 1.6 m (5' 3\").    Weight as of this encounter: 68 kg (150 lb).       # Financial/Environmental Concerns: none         Disposition Plan     Medically Ready for Discharge: Anticipated in 2-4 Days           JESUS MATHEW MD  Hospitalist Service  [facility]  Securely message with [employer] (more info)  Text page via [facility] Paging/Directory     ______________________________________________________________________    Chief Complaint   Right knee pain.     History is obtained from the patient, family and chart review    History of Present Illness   Arline Link is a 84 year old female history of hypertension, vitamin D deficiency, hypothyroidism, cognitive impairment, recurrent falls, who presents to the emergency room with complaints of right leg pain with a history of recurrent falls in her home    Patient presented to the ER with fall reported earlier in the day. Reported to live independent in a house. Per report family has tried to convince patient to live in different care environment after recurrent presentations for falling. She is reported to have dementia. Family has numerous video cameras in the house and reported to see her falling on numerous occasions.   On presentation to the ER she reported to go to the BR using her walker and then slumped to the floor and could not get up. EMS came to get her up.   Complaining of bilateral shin pain, R > Left.   Initially refused to get out of bed to ambulate in the ER.     Xray of tib/fib and right knee   Knee: Cortical irregularity posterior aspect tibial plateaus on lateral view is age indeterminate, more likely not acute. There is no associated joint effusion. Moderate " medial and patellofemoral compartment degenerative arthritis. Diffuse bony    demineralization.        Tibia-fibula: No acute fracture. Diffuse bony demineralization.     CT of her right knee   1.  Acute nondisplaced tibial plateau fracture most pronounced laterally where there is mild impaction along the posterolateral tibial plateau.  2.  Bones are markedly demineralized which limits fracture detection.  3.  Small joint effusion and popliteal cyst with lipohemarthrosis.     Orthopedic consult   Will need TCU and placement       Past Medical History    Past Medical History:   Diagnosis Date    Benign essential hypertension 2016    started med by endocrine 11/16    Elevated ALT measurement 2015    fu nl 12/16    Hypercholesteremia     Hypothyroid 2012    Dr. Schmitt    Mucinosis of skin     Dr. Navas    Vitamin D deficiency        Past Surgical History   Past Surgical History:   Procedure Laterality Date    APPENDECTOMY  14    TONSILLECTOMY & ADENOIDECTOMY  5       Prior to Admission Medications   Prior to Admission Medications   Prescriptions Last Dose Informant Patient Reported? Taking?   SYNTHROID 50 MCG tablet Unknown Daughter No Yes   Sig: TAKE 1 TABLET(50 MCG) BY MOUTH DAILY   acetaminophen (TYLENOL) 325 MG tablet Unknown Daughter Yes Yes   Sig: Take 325-650 mg by mouth every 6 hours as needed for mild pain   losartan (COZAAR) 50 MG tablet Unknown Daughter No Yes   Sig: Take 1 tablet (50 mg) by mouth daily.      Facility-Administered Medications: None        Review of Systems    The 10 point Review of Systems is negative other than noted in the HPI or here.     Social History   I have reviewed this patient's social history and updated it with pertinent information if needed.  Social History     Tobacco Use    Smoking status: Never    Smokeless tobacco: Never   Substance Use Topics    Alcohol use: No     Alcohol/week: 0.0 standard drinks of alcohol    Drug use: No       Allergies   Allergies   Allergen  Reactions    Sulfa Antibiotics Unknown    Tetracycline Rash        Physical Exam   Vital Signs: Temp: 97.5  F (36.4  C) Temp src: Temporal BP: (!) 146/95 Pulse: 105   Resp: 16 SpO2: 98 %      Weight: 150 lbs 0 oz    General Appearance: Patient is angry and agitated.  Respiratory: Lungs are clear to auscultation bilaterally without wheezes or rhonchi  Cardiovascular: Regular rate and rhythm without gallop rub normal S1-S2  GI: Positive bowel sounds soft, nontender.  Skin: Her right leg is in a immobilizer.'s at the level of her knee.  She has very high arches in her feet bilaterally.  Almost inverted ankle    Medical Decision Making       75 MINUTES SPENT BY ME on the date of service doing chart review, history, exam, documentation & further activities per the note.      Data     I have personally reviewed the following data over the past 24 hrs:    7.4  \   15.1   / 271     136 102 14.6 /  117 (H)   4.6 22 1.21 (H) \       Imaging results reviewed over the past 24 hrs:   Recent Results (from the past 24 hours)   XR Tibia and Fibula Right 2 Views    Narrative    EXAM: XR KNEE RIGHT 3 VIEWS, XR TIBIA AND FIBULA RIGHT 2 VIEWS  LOCATION: [facility]  DATE: 1/12/2025    INDICATION: Fall, pain, concern for fracture  COMPARISON: None.      Impression    IMPRESSION:   Knee: Cortical irregularity posterior aspect tibial plateaus on lateral view is age indeterminate, more likely not acute. There is no associated joint effusion. Moderate medial and patellofemoral compartment degenerative arthritis. Diffuse bony   demineralization.     Tibia-fibula: No acute fracture. Diffuse bony demineralization.   XR Knee Right 3 Views    Narrative    EXAM: XR KNEE RIGHT 3 VIEWS, XR TIBIA AND FIBULA RIGHT 2 VIEWS  LOCATION: [facility]  DATE: 1/12/2025    INDICATION: Fall, pain, concern for fracture  COMPARISON: None.      Impression    IMPRESSION:   Knee: Cortical irregularity posterior  aspect tibial plateaus on lateral view is age indeterminate, more likely not acute. There is no associated joint effusion. Moderate medial and patellofemoral compartment degenerative arthritis. Diffuse bony   demineralization.     Tibia-fibula: No acute fracture. Diffuse bony demineralization.   CT Knee Right w/o Contrast    Narrative    EXAM: CT KNEE RIGHT W/O CONTRAST  LOCATION: [facility]  DATE: 1/12/2025    INDICATION: Fall, evaluate for possible acute tibial plateau fracture.  COMPARISON: Same day radiograph.  TECHNIQUE: Noncontrast. Axial, sagittal and coronal thin-section reconstruction. Dose reduction techniques were used.     FINDINGS:     BONES:  -Bones are markedly demineralized which limits fracture detection.     -Acute to subacute mildly impacted fracture along the lateral tibial plateau which shows posterior sloping as seen on sagittal image 26. There is some impaction and curvilinear sclerosis. There is suggestion of the fracture extending across midline where   there is a vague longitudinal lucency through the posteromedial tibial plateau seen on coronal image 34.    -Tricompartment degenerative changes right knee which are moderate in the medial compartment.    SOFT TISSUES:  -Small joint effusion with probable lipohemarthrosis. Mild scattered atherosclerotic vascular calcifications. Small amount of fluid/blood products along the posterior aspect of the knee. Small popliteal cyst with lipohemarthrosis.      Impression    IMPRESSION:  1.  Acute nondisplaced tibial plateau fracture most pronounced laterally where there is mild impaction along the posterolateral tibial plateau.    2.  Bones are markedly demineralized which limits fracture detection.    3.  Small joint effusion and popliteal cyst with lipohemarthrosis.    NOTE: ABNORMAL REPORT    THE DICTATION ABOVE DESCRIBES AN ABNORMALITY FOR WHICH FOLLOW-UP IS NEEDED.

## 2025-01-12 NOTE — ED PROVIDER NOTES
I assumed patient care from Dr. Salas.  At that time, pending disposition and results of CT of right knee.    Care during my shift:  4:09 PM CT scan shows tibial plateau fracture.  Patient is complaining of pain mostly in that location.  I updated social work.  Will plan for admission at this time.  I discussed with Bhumi CASTILLO from [facility].  She recommends placement of knee immobilizer and nonweightbearing status in the right lower extremity.  Agrees with admission.  I signed patient out to hospitalist Dr. Hurd for admission.             Joe Mtz MD  01/12/25 4549

## 2025-01-12 NOTE — ED TRIAGE NOTES
BIBA due to fall. Pt lives alone. She was sitting on the bed tried to move quick, fell on the floor ground (sitting) unable to get up. Daughter called 911. Pt has R leg pain from previous ED visit.

## 2025-01-12 NOTE — ED NOTES
Bed: ED21  Expected date:   Expected time:   Means of arrival:   Comments:  Tasha 1 84F fall from standing

## 2025-01-12 NOTE — ED NOTES
[facility]  ED Nurse Handoff Report    ED Chief complaint: Fall      ED Diagnosis:   Final diagnoses:   Falls frequently   Weakness generalized   History of dementia   Closed fracture of right tibial plateau, initial encounter   Effusion of right knee       Code Status: MD to determine    Allergies:   Allergies   Allergen Reactions    Sulfa Antibiotics Unknown    Tetracycline Rash       Patient Story: fall, knee pain  Focused Assessment:  Patient BIBA from home after a fall with right knee pain, baseline confusion from dementia, been refusing to go to care facility per family reports. Will be admitted for right tibial fracture and knee pain.     Treatments and/or interventions provided: see MAR  Patient's response to treatments and/or interventions: TBD    To be done/followed up on inpatient unit:  continue to monitor, pain control    Does this patient have any cognitive concerns?: Short term memory loss    Activity level - Baseline/Home:  Stand with Assist  Activity Level - Current:   Total Care    Patient's Preferred language: English   Needed?: No    Isolation: None  Infection: Not Applicable  Patient tested for COVID 19 prior to admission: NO  Bariatric?: No    Vital Signs:   Vitals:    01/12/25 1040 01/12/25 1041 01/12/25 1435 01/12/25 1453   BP: (!) 127/93  (!) 146/95    Pulse: 104  105    Resp:       Temp:       TempSrc:       SpO2:  98% 98% 97%   Weight:       Height:           Cardiac Rhythm:Cardiac Rhythm: Sinus tachycardia    Was the PSS-3 completed:   Yes  What interventions are required if any?               Family Comments: NA  OBS brochure/video discussed/provided to patient/family: Yes              Name of person given brochure if not patient: na              Relationship to patient: na    For the majority of the shift this patient's behavior was Green.   Behavioral interventions performed were none.    ED NURSE PHONE NUMBER: *[id number]

## 2025-01-12 NOTE — ED PROVIDER NOTES
Emergency Department Note      History of Present Illness     Chief Complaint  Fall    HPI  Arline Link is a 84 year old female presents to the emergency department after a slight fall earlier today.  The patient lives independently in a house.  I saw the patient back on October ninth of 2024, just a few months ago, after a similar presentation.  The patient was also seen on December 22 just a few weeks ago again for a similar presentation.  I recall meeting with this patient's daughter and son-in-law a number of months ago noting that she was not appearing to be safe in her independent living environment with her frailty and advanced dementia.  They have continued to try to convince the patient that she needs a different care environment but have been unsuccessful so far.  They note that the patient is very difficult, argumentative, and demanding at times.  The patient notes that she was returning from the bathroom using her walker when she slumped to the floor and could not get up.  She called the EMS who came to get her up.  She complained of bilateral shin pain, right greater than left initially.  On examination she noted that the pain was not too bad.  As time went on in the emergency department she started noticing that the pillows and blankets behind her back and right hip was causing her back and hips to be sore.  The patient refused to get out of bed initially to attempt ambulate for a trial without getting some Tylenol for so this was provided.  The patient did not experience any significant head trauma.  I spoke with the family who notes that they have video cameras in the house and they see her falling over slightly on numerous occasions.  They recognize that she should not return for safety purposes to her home at this time.  As a mention to them during the last emergency department encounter with me, we did discuss TCU placement but that ultimately did not happen.  The patient was not a candidate  "for hospitalization back when I saw her in October nor was that the case in December.    Independent Historian  I spoke with the patient's daughter and son-in-law who corroborated the history above and remember working with me back in October.    Review of External Notes  I reviewed the emergency department encounter from October and December.  Past Medical History   Medical History and Problem List  Past Medical History:   Diagnosis Date    Benign essential hypertension 2016    Elevated ALT measurement 2015    Hypercholesteremia     Hypothyroid 2012    Mucinosis of skin     Vitamin D deficiency        Medications  acetaminophen (TYLENOL) 325 MG tablet  losartan (COZAAR) 50 MG tablet  multivitamin w/minerals (THERA-VIT-M) tablet  SYNTHROID 50 MCG tablet  Vitamin D, Cholecalciferol, 1000 units TABS        Surgical History   Past Surgical History:   Procedure Laterality Date    APPENDECTOMY  14    TONSILLECTOMY & ADENOIDECTOMY  5     Physical Exam   Patient Vitals for the past 24 hrs:   BP Temp Temp src Pulse Resp SpO2 Height Weight   01/12/25 1041 -- -- -- -- -- 98 % -- --   01/12/25 1040 (!) 127/93 -- -- 104 -- -- -- --   01/12/25 1019 -- -- -- -- -- 97 % -- --   01/12/25 0736 -- -- -- -- -- 98 % -- --   01/12/25 0735 -- -- -- -- -- 97 % -- --   01/12/25 0734 -- -- -- -- -- 97 % -- --   01/12/25 0733 -- -- -- -- -- 97 % -- --   01/12/25 0731 -- -- -- -- -- 97 % -- --   01/12/25 0702 (!) 169/95 97.5  F (36.4  C) Temporal 103 16 96 % 1.6 m (5' 3\") 68 kg (150 lb)     Physical Exam    General: Resting on the ED bed, the patient has advanced dementia  Head:  The scalp, face, and head appear normal, no signs of trauma  Eyes:  The pupils are equal, round, and reactive to light    There is no nystagmus    Extraocular muscles are intact    Conjunctivae and sclerae are normal  ENT:    The nose is normal    Pinnae are normal    The oropharynx is normal  Neck:  Normal range of motion    There is no rigidity noted  CV:  Regular " rate  Normal underlying rhythm     Normal S1/S2    No pathological murmur detected  Resp:  Lungs are clear    There is no tachypnea    Non-labored    No rales    No wheezing   GI:  Abdomen is soft, there is no rigidity    No distension/tympani    No rebound tenderness     Non-surgical without peritoneal features at this time  MS:  Normal muscular tone    Symmetric motor strength    No asymmetric leg swelling, no calf tenderness    Right leg: The hip is nontender.  The femur is nontender.  The patient notes that she has diffuse mild discomfort to the soft tissue and anterior tibial shaft area.  She notes that her knee chronically hurts.  There is no step-offs noted.  There is normal range of motion of the right leg.  Left leg: There is no pain currently in the left tibia or fibula region.  No left hip pain.  Skin:  There is bruising and chronic stasis dermatitis changes to both shins.  Neuro:  Speech is normal and fluent  There is no aphasia    No focal motor deficits    Cranial nerves are intact    The patient has advanced dementia.  She is agitated at times.  She is demanding of the nursing staff.  She notes that nobody here knows what they are doing.  I recall that she had similar behaviors the last time I saw her.  Psych: Awake.    Intermittent verbal insults.      Diagnostics   Lab Results   Labs Ordered and Resulted from Time of ED Arrival to Time of ED Departure   BASIC METABOLIC PANEL - Abnormal       Result Value    Sodium 136      Potassium 4.6      Chloride 102      Carbon Dioxide (CO2) 22      Anion Gap 12      Urea Nitrogen 14.6      Creatinine 1.21 (*)     GFR Estimate 44 (*)     Calcium 10.1      Glucose 117 (*)    CBC WITH PLATELETS AND DIFFERENTIAL    WBC Count 7.4      RBC Count 4.86      Hemoglobin 15.1      Hematocrit 44.8      MCV 92      MCH 31.1      MCHC 33.7      RDW 13.7      Platelet Count 271      % Neutrophils 75      % Lymphocytes 18      % Monocytes 5      % Eosinophils 1      %  Basophils 1      % Immature Granulocytes 1      NRBCs per 100 WBC 0      Absolute Neutrophils 5.5      Absolute Lymphocytes 1.3      Absolute Monocytes 0.4      Absolute Eosinophils 0.0      Absolute Basophils 0.1      Absolute Immature Granulocytes 0.1      Absolute NRBCs 0.0         Imaging  XR Knee Right 3 Views   Final Result   IMPRESSION:    Knee: Cortical irregularity posterior aspect tibial plateaus on lateral view is age indeterminate, more likely not acute. There is no associated joint effusion. Moderate medial and patellofemoral compartment degenerative arthritis. Diffuse bony    demineralization.       Tibia-fibula: No acute fracture. Diffuse bony demineralization.      XR Tibia and Fibula Right 2 Views   Final Result   IMPRESSION:    Knee: Cortical irregularity posterior aspect tibial plateaus on lateral view is age indeterminate, more likely not acute. There is no associated joint effusion. Moderate medial and patellofemoral compartment degenerative arthritis. Diffuse bony    demineralization.       Tibia-fibula: No acute fracture. Diffuse bony demineralization.          EKG   No results found for this or any previous visit.    Independent Interpretation by Dr. Salas  There is no definitive acute fracture.  There is a slight cortical irregularity to the tibial plateau on the lateral view that is age indeterminant.  It is suggested that it is not acute.  We will follow-up with a CT scan to further evaluate this.  Both of the patient's legs hurt bilaterally the right shin was initially tender, on repeat assessment, the patient's leg was not really bothering her.  ED Course    Medications Administered  Medications   acetaminophen (TYLENOL) tablet 1,000 mg (1,000 mg Oral $Given 1/12/25 8086)       Procedures  Procedures     Discussion of Management  I spoke with the  on duty  I spoke with the patient's family  Spoke with the physical therapist, at 1220 who are here for consultation    Social  Determinants of Health adding to complexity of care  None    ED Course  ED Course as of 01/12/25 1557   Sun Jan 12, 2025   1010 I spoke with the family at length.  They remembered me from a prior visit.  They note that the patient frequently falls in her apartment.  She does have dementia.  They do not believe that she is safe to return to her environment.  She was not really able to function with a walker here in the emergency department.  She does appear similar to when I saw her a number of months back.  This has been a longtime social/disposition issue for the patient's daughter and son-in-law.   came down to see the patient.  We are going to place a physical therapy consult.   1416 The  is working on TCU placement.  The patient still is insisting that she does not want to go anywhere.  The patient does not have decisional capacity given her advanced dementia.  She also is a vulnerable adult and unsafe to return at this juncture to her living situation as she cannot safely ambulate.  She had had recurrent falls.   Physical therapy saw the patient and apparently she did not do well with her assessment.  They are coordinating with the  at this time.  The patient was given some Haldol intravenously to help her rest.    Medical Decision Making / Diagnosis   CMS Diagnoses: None    MIPS     None    MDM  Arline Link is a 84 year old female presents to the emergency department with frequent falls.  She is living independently.  This problem has been going on for a number of months.    This has been a longstanding problem for the family.  They know that she is not safe in her current environment.  She does have severe dementia.  The patient is occasionally slightly verbally abusive, not physically abusive.  This likely stems mainly from her dementia.  The physical therapist and the  have been collaborating on this patient and trying to get her into a TCU.  I will  sign the patient out to my partner Dr. Joe Mtz who will have to help with TCU orders if we are able to place this patient.     Given a slight potential abnormality involving the right knee, a CT scan of the knee will be ordered to evaluate for tibial plateau fracture posteriorly.  Patient is signed out to Dr. Mtz at this time at 1410 hrs    3:58 PM  CT is positive for mild tibial plateau fracture.        Disposition  The patient will be signed out to my partner Dr. Mtz.    ICD-10 Codes:     Falls  Weakness  Dementia  Tibial plateau fracture    Discharge Medications  New Prescriptions    No medications on file         MD Josue Shen Michael P, MD  01/12/25 1353       Dino Salas MD  01/12/25 1410       Dino Salas MD  01/12/25 1821

## 2025-01-12 NOTE — PHARMACY-ADMISSION MEDICATION HISTORY
"Pharmacist Admission Medication History    Admission medication history is complete. The information provided in this note is only as accurate as the sources available at the time of the update.    Information Source(s): Family member and CareEverywhere/SureScripts via phone    Pertinent Information: pharmacy staff other than writer attempted interview with patient, not reliable historian. Writer reviewed fill history and called daughter Ashleigh. Ashleigh states patient takes \"a medication for blood pressure and one for thyroid\" but did not specify names of medications or doses. Fill history is up to date for both losartan and levothyroxine, although daughter made a comment \"who even knows if the is taking them\" with potential memory issues.     Changes made to PTA medication list:  Added: None  Deleted: multivitamin, vitamin D (daughter states patient used to take [employer] brand vitamins, but no longer, and daughter confirms patient only taking two prescribed medications)   Changed: None    Allergies reviewed with patient and updates made in EHR: no    Medication History Completed By: Lydia Negro RP 1/12/2025 3:20 PM    PTA Med List   Medication Sig Last Dose/Taking    acetaminophen (TYLENOL) 325 MG tablet Take 325-650 mg by mouth every 6 hours as needed for mild pain Unknown    losartan (COZAAR) 50 MG tablet Take 1 tablet (50 mg) by mouth daily. Unknown    SYNTHROID 50 MCG tablet TAKE 1 TABLET(50 MCG) BY MOUTH DAILY Unknown       "

## 2025-01-12 NOTE — CONSULTS
Care Management Initial Consult    General Information  Assessment completed with: Patient, Ashleigh Nunn Madhu  Type of CM/SW Visit: Initial Assessment    Primary Care Provider verified and updated as needed: Yes   Readmission within the last 30 days: no previous admission in last 30 days      Reason for Consult: discharge planning  Advance Care Planning: Advance Care Planning Reviewed: no concerns identified          Communication Assessment  Patient's communication style: spoken language (English or Bilingual)             Cognitive  Cognitive/Neuro/Behavioral: .WDL except (took a fall while sitting on the edge of the bed, denies of head strike, no LOC.)                      Living Environment:   People in home: alone     Current living Arrangements: house      Able to return to prior arrangements: no       Family/Social Support:  Care provided by: homecare agency, child(irene), self  Provides care for: no one, unable/limited ability to care for self  Marital Status:   Support system: Children          Description of Support System: Supportive, Involved    Support Assessment: Lacks adequate physical care    Current Resources:   Patient receiving home care services: Yes  Skilled Home Care Services: Home Health Aid     Community Resources: Housekeeping/Chore Agency, Other (see comment) (vis[facility])  Equipment currently used at home: grab bar, toilet, grab bar, tub/shower, walker, rolling  Supplies currently used at home: Incontinence Supplies    Employment/Financial:  Employment Status: retired        Financial Concerns: none   Referral to Financial Worker: No       Does the patient's insurance plan have a 3 day qualifying hospital stay waiver?  No    Lifestyle & Psychosocial Needs:  Social Drivers of Health     Food Insecurity: Not on file   Depression: Not at risk (1/22/2024)    PHQ-2     PHQ-2 Score: 0   Housing Stability: Not on file   Tobacco Use: Low Risk  (3/4/2024)    Patient History     Smoking  Tobacco Use: Never     Smokeless Tobacco Use: Never     Passive Exposure: Not on file   Financial Resource Strain: Not on file   Alcohol Use: Not on file   Transportation Needs: Not on file   Physical Activity: Not on file   Interpersonal Safety: Not on file   Stress: Not on file   Social Connections: Not on file   Health Literacy: Not on file       Functional Status:  Prior to admission patient needed assistance:   Dependent ADLs:: Ambulation-walker, Bathing, Transfers  Dependent IADLs:: Cleaning, Cooking, Laundry, Meal Preparation, Shopping, Medication Management, Money Management, Transportation, Incontinence  Assesssment of Functional Status: Needs placement in a SNF/TCF for rehabilitation, Has complex medical needs, requires placement in a facility    Mental Health Status:          Chemical Dependency Status:                Values/Beliefs:  Spiritual, Cultural Beliefs, Scientology Practices, Values that affect care: no               Discussed  Partnership in Safe Discharge Planning  document with patient/family: No    Additional Information:  Per care management consult, chart was reviewed. Pt is a 84 year old woman who lives alone and was brought into the ED because of a fall.     SW met with pt, daughter Ashleigh, and son in law Leung, at bedside and introduced self and role. Ashleigh and Madhu report patient has fell 4 times this week and is no longer able to ambulate with her walker independently. They state pt is below her baseline because she used to be able to ambulate with her walker. Pt lives in a house by herself. Family states pt is in bed 23 hours a day, and gets up two times to use the bathroom, which is when she falls. Family states pt urinates in pads in her underwear and then throws them in the corner to avoid getting up. Ashleigh said pt has [facility] 3x per week, a  once every 3 weeks, and a person to help with groceries and laundry weekly. Pt did have [facility] last year, but  pt did not like people in her home. Ashleigh states pt just eats crackers and muffins that are next to her bed. Ashleigh and Madhu are concerned pt is no longer safe at home and she is declining in her ability to be at home. Ashleigh reports Maren Barboza, a SW at [employer] is familiar with pt and is also concerned with pt's safety. Madhu installed grab bars in the pt's bathroom yesterday.     DAVID and MD discussed with family and MD will consult PT to hopefully get pt into a TCU today, because pt is below her baseline. Family is agreeable. DAVID explained TCU can allow for more time and resources for family to find a facility or more supports for patient. DAVID told family she can make a [facility] report because pt is not able to take care of herself at home anymore. Family is appreciative. Ashleigh and Madhu said their preferred TCU location is out in [place], [place], [place], [place], or [place] because they live in [place].     MD consulted PT, DAVID paged PT to request an evaluation this morning so they can get her to a TCU today.     DAVID completed a [facility] report: [id number]    Addendum 1240: PT informed DAVID that they will recommend TCU because pt is well below baseline. DAVID sent referrals to TCU's in the [facility], per family request.     Addendum 1400: DAVID explained TCU recommendation to pt, pt said she does not want to go to TCU. DAVID informed MD. MD said pt is not decisional and is unsafe at home by herself.     DAVID messaged Kaitlin from [facility] and Nehal from [place] to ask for follow up on referral.     Addendum 1600: MD informed DAVID that pt will admit because of a fracture.     Next Steps: DAVID will continue to follow for TCU discharge     Becky Parekh [facility]  Inpatient Care Management

## 2025-01-12 NOTE — ED NOTES
Patient given 2nd dose of Haldol 2 mg and 5 mg oxycodone per Dr's order. Patient with ongoing agitation, c/o right knee pain.

## 2025-01-13 PROBLEM — R41.0 DELIRIUM: Status: ACTIVE | Noted: 2025-01-13

## 2025-01-13 LAB
ALBUMIN SERPL BCG-MCNC: 3.9 G/DL (ref 3.5–5.2)
ALP SERPL-CCNC: 105 U/L (ref 40–150)
ALT SERPL W P-5'-P-CCNC: 19 U/L (ref 0–50)
ANION GAP SERPL CALCULATED.3IONS-SCNC: 12 MMOL/L (ref 7–15)
AST SERPL W P-5'-P-CCNC: 35 U/L (ref 0–45)
ATRIAL RATE - MUSE: 95 BPM
BILIRUB SERPL-MCNC: 1.2 MG/DL
BUN SERPL-MCNC: 17.5 MG/DL (ref 8–23)
CALCIUM SERPL-MCNC: 9.7 MG/DL (ref 8.8–10.4)
CHLORIDE SERPL-SCNC: 103 MMOL/L (ref 98–107)
CREAT SERPL-MCNC: 1.18 MG/DL (ref 0.51–0.95)
DIASTOLIC BLOOD PRESSURE - MUSE: NORMAL MMHG
EGFRCR SERPLBLD CKD-EPI 2021: 45 ML/MIN/1.73M2
ERYTHROCYTE [DISTWIDTH] IN BLOOD BY AUTOMATED COUNT: 13.5 % (ref 10–15)
GLUCOSE SERPL-MCNC: 96 MG/DL (ref 70–99)
HCO3 SERPL-SCNC: 22 MMOL/L (ref 22–29)
HCT VFR BLD AUTO: 40.6 % (ref 35–47)
HGB BLD-MCNC: 14.1 G/DL (ref 11.7–15.7)
INTERPRETATION ECG - MUSE: NORMAL
MCH RBC QN AUTO: 31.6 PG (ref 26.5–33)
MCHC RBC AUTO-ENTMCNC: 34.7 G/DL (ref 31.5–36.5)
MCV RBC AUTO: 91 FL (ref 78–100)
P AXIS - MUSE: -6 DEGREES
PLATELET # BLD AUTO: 233 10E3/UL (ref 150–450)
POTASSIUM SERPL-SCNC: 4.8 MMOL/L (ref 3.4–5.3)
PR INTERVAL - MUSE: 138 MS
PROT SERPL-MCNC: 6.9 G/DL (ref 6.4–8.3)
QRS DURATION - MUSE: 76 MS
QT - MUSE: 358 MS
QTC - MUSE: 449 MS
R AXIS - MUSE: 14 DEGREES
RBC # BLD AUTO: 4.46 10E6/UL (ref 3.8–5.2)
SODIUM SERPL-SCNC: 137 MMOL/L (ref 135–145)
SYSTOLIC BLOOD PRESSURE - MUSE: NORMAL MMHG
T AXIS - MUSE: 265 DEGREES
VENTRICULAR RATE- MUSE: 95 BPM
WBC # BLD AUTO: 6.4 10E3/UL (ref 4–11)

## 2025-01-13 PROCEDURE — 36415 COLL VENOUS BLD VENIPUNCTURE: CPT | Performed by: INTERNAL MEDICINE

## 2025-01-13 PROCEDURE — 80053 COMPREHEN METABOLIC PANEL: CPT | Performed by: INTERNAL MEDICINE

## 2025-01-13 PROCEDURE — 250N000013 HC RX MED GY IP 250 OP 250 PS 637: Performed by: INTERNAL MEDICINE

## 2025-01-13 PROCEDURE — 93010 ELECTROCARDIOGRAM REPORT: CPT | Performed by: INTERNAL MEDICINE

## 2025-01-13 PROCEDURE — 120N000001 HC R&B MED SURG/OB

## 2025-01-13 PROCEDURE — 82040 ASSAY OF SERUM ALBUMIN: CPT | Performed by: INTERNAL MEDICINE

## 2025-01-13 PROCEDURE — 250N000013 HC RX MED GY IP 250 OP 250 PS 637: Performed by: PHYSICIAN ASSISTANT

## 2025-01-13 PROCEDURE — 99233 SBSQ HOSP IP/OBS HIGH 50: CPT | Performed by: PHYSICIAN ASSISTANT

## 2025-01-13 PROCEDURE — 85027 COMPLETE CBC AUTOMATED: CPT | Performed by: INTERNAL MEDICINE

## 2025-01-13 PROCEDURE — 93005 ELECTROCARDIOGRAM TRACING: CPT

## 2025-01-13 PROCEDURE — 82310 ASSAY OF CALCIUM: CPT | Performed by: INTERNAL MEDICINE

## 2025-01-13 RX ORDER — QUETIAPINE FUMARATE 25 MG/1
25 TABLET, FILM COATED ORAL
Status: DISCONTINUED | OUTPATIENT
Start: 2025-01-13 | End: 2025-02-03 | Stop reason: HOSPADM

## 2025-01-13 RX ORDER — QUETIAPINE FUMARATE 25 MG/1
25 TABLET, FILM COATED ORAL ONCE
Status: DISCONTINUED | OUTPATIENT
Start: 2025-01-13 | End: 2025-01-13

## 2025-01-13 RX ORDER — ASPIRIN 81 MG/1
162 TABLET ORAL DAILY
Status: DISCONTINUED | OUTPATIENT
Start: 2025-01-13 | End: 2025-01-15

## 2025-01-13 RX ORDER — QUETIAPINE FUMARATE 25 MG/1
25 TABLET, FILM COATED ORAL EVERY 6 HOURS PRN
Status: DISCONTINUED | OUTPATIENT
Start: 2025-01-13 | End: 2025-02-03 | Stop reason: HOSPADM

## 2025-01-13 RX ORDER — POLYETHYLENE GLYCOL 3350 17 G/17G
17 POWDER, FOR SOLUTION ORAL 2 TIMES DAILY PRN
Status: DISCONTINUED | OUTPATIENT
Start: 2025-01-13 | End: 2025-02-03 | Stop reason: HOSPADM

## 2025-01-13 RX ORDER — HALOPERIDOL 2 MG/1
2 TABLET ORAL EVERY 6 HOURS PRN
Status: DISCONTINUED | OUTPATIENT
Start: 2025-01-13 | End: 2025-02-03 | Stop reason: HOSPADM

## 2025-01-13 RX ORDER — QUETIAPINE FUMARATE 25 MG/1
25 TABLET, FILM COATED ORAL AT BEDTIME
Status: DISCONTINUED | OUTPATIENT
Start: 2025-01-13 | End: 2025-02-03 | Stop reason: HOSPADM

## 2025-01-13 RX ADMIN — LEVOTHYROXINE SODIUM 50 MCG: 50 TABLET ORAL at 09:26

## 2025-01-13 ASSESSMENT — ACTIVITIES OF DAILY LIVING (ADL)
ADLS_ACUITY_SCORE: 40
ADLS_ACUITY_SCORE: 40
ADLS_ACUITY_SCORE: 41
ADLS_ACUITY_SCORE: 25
ADLS_ACUITY_SCORE: 41
ADLS_ACUITY_SCORE: 25
ADLS_ACUITY_SCORE: 25
ADLS_ACUITY_SCORE: 40
ADLS_ACUITY_SCORE: 41
ADLS_ACUITY_SCORE: 34
ADLS_ACUITY_SCORE: 25
ADLS_ACUITY_SCORE: 25
ADLS_ACUITY_SCORE: 41
ADLS_ACUITY_SCORE: 25
ADLS_ACUITY_SCORE: 40

## 2025-01-13 NOTE — PROGRESS NOTES
RECEIVING UNIT ED HANDOFF REVIEW    ED Nurse Handoff Report was reviewed by: Mariano Bustamante RN on January 12, 2025 at 8:37 PM

## 2025-01-13 NOTE — PROGRESS NOTES
[id number]    DX: Acute non-displaced tibial plateau fx (R) from a fall         Hx of recurrent falls          Neurocognitive impairment with behavioral disturbances  Mental Status: Alert and oriented x 2, angry/irritable  Activity: Bedrest, NWB RLE until seen by Ortho  Diet: NPO  O2/VS: Room air  Pain: Sched Tylenol given x 1  LDA: PIV RUE, purewick  Discharge: TBD, TCU vs LTC  Other Info: VSS. CMS intact. Immobilizer RLE. T/R with assist of 2. Irritable and non-cooperative with cares at times. Refused scheduled Tylenol x 1. Did not void this shift, bladder scan is 346 ml.  K+ protocol, labs this AM.

## 2025-01-13 NOTE — PROGRESS NOTES
Therapy needs to await orthopedic consult and plans for RLE prior to any weight bearing or activity.   Vannessa

## 2025-01-13 NOTE — PROGRESS NOTES
[facility]  Hospitalist / BERTHA Progress Note  Date Admitted: 1/12/2025  6:57 AM  Date of Service: 01/13/2025              Assessment and Plan:                                         This is a 84 year old, female, w a PMH of cognitive decline per family, rule out dementia, HTN, vitamin D deficiency, hypothyroidism, multiple falls, who was admitted on 1/12/2025, after another fall at home and found to have a right tibial plateau fracture on CT.    Principal Problem:    Closed fracture of right tibial plateau, initial encounter  Active Problems:    Benign essential hypertension    Effusion of right knee    History of dementia    Weakness generalized    Falls frequently    Delirium      PLAN  Fall with Closed fracture of right tibial plateau, initial encounter  Effusion of right knee  Weakness generalized    Falls frequently   Admitted with acute pain after a fall, history of frequent falls, such that family has a home camera.  X-ray suggested and CT confirmed tibial plateau fracture.  Orthopedics consulted.  Nonoperative.  Recommends nonweightbearing RLE, with walker use.  Unable to discharge home, safety concerns, patient denies pain.    Plan:   -Admitted inpatient  -X-ray cortical irregularity,   - CT right knee confirm nondisplaced tibial plateau fracture, lateral mild impaction.  -Orthopedic consulted 1/13   -Nonoperative  -Knee immobilizer at all times except for bathing cares  -Nonweightbearing when standing with walker  -PT following  -Pain control with acetaminophen, note patient declined Rx  -prn oxycodone avail, Caution with opiates due to delirium risk 2.  - OT consulted  -Social work consulted  -am CBC BMP    Neuro cognitive impairment with behavioral disturbance  Acute metabolic encephalopathy on suspected worsening dementia  Per chart review Hx progressive neurocognitive impairment.  Lives alone.  Irritable, angry demanding and sarcastic outpatient at past visits.  Due to  worsening cognition, appears doubtful she can return to home safely alone.daughter has been very worried about the patient's progressive fall is progressive cognitive impairment, with history favoring worsening dementia, and patient declining intervention/eval.  1/13- haldol x 2 yest day, refusing APAp, cares overnight. Today is hallucinating at home, frightened someone in basement, denies is confused or has fx. denies she has any problems.  Does not have intact MDM to decline TCU or cares in my opinion. D/w SW, support [facility] filed for care refusals/home if not done prior.  Discussed with daughter.acute metabolic encephalopathy , trail Seroquel scheduled. Add CTH    Plan:   -Trial Seroquel afternoon plus HS to help with anxiety/agitation/delirium  -Seroquel as first as needed for worsening behaviors   -Haldol 2 mg Q6 as second prn   -ECG-evaluate QTc -->WNL   -OT with cog eval  -Patient does not have intact MDM at this time to refuse cares or site  - CT head (screening/none recent)  -Discussed with social work, unable to go home-Plan for TCU  -Report [facility] was filed on arrival which I concur with.  -Daughter will pursue [facility]/supports outpatient supports     History of hypothyroidism  TSH recheck WNL 2.72,   -PTA Synthroid 50 daily continued    History of hypertension  By chart review, ?taking losartan 50 daily. Follow BP, consider resumed dosing  1/13-/75  -Consider resuming ARB prior to discharge if can assure no orthostasis    History of hypercholesterolemia  No PTA statin    Code Status:Full Code  NUTRITION Regular Diet Adult     DVT Prophylaxis:  daily per Ortho, x 6 weeks.  Souza Catheter: Not present  Lines: None     Cardiac Monitoring: None    NURSING COMMUNICATION:  Bedside rounding completed with nurse.  FAMILY COMMUNICATION: Discussed with daughter in detail discussion via phone, very helpful collateral regarding previous declines, current state.     (if used):None    Disposition Plan       Expected Discharge Date: 01/14/2025      Destination: home        Entered: Reji Duque PA-C 01/13/2025, 5:45 PM      DISCHARGE PLANNING:  Medically Ready for Discharge: Anticipated Tomorrow    Criteria for discharge from the hospitalist perspective is TCU site found, stable pain control, not worsening mood/agitation    Additional comments:   Discussed in detail w Dr ERNA Coleman , Hospitalist Attending Provider/Rounder. The above assessment and plan is the product of our joint decision making.     Time - I spent 50 minutes w greater than 50% spent in face to face counseling and education re dx, rx, plan and also including coordination of care, time documenting, and family updates if applicable.    Reji Duque PA-C  Hospitalist BERTHA  [facility]   Securely message with the Vocera Web Console (learn more here)  Text page via [employer] Paging/Directory      Please note this documentation was partially completed using Dragon medical dictation transcription software. It was briefly proofread, but will likely have unrecognized and unintended incorrect words, numbers/values or phrases in transcription Also this is primarily intended as a direct peer to peer communication with medical abbreviations and verbiage that may appear to be 'direct' as to succinctly relay medical info and opinions to care teams. If there are any questions on content/transcription, or diagnosis and plan, please contact me immediately for clarification.         SUBJECTIVE:   INTERVAL EVENTS Reviewed-      Today's chief complaint-denies    Patient for the first time.  Does not offer any symptoms.  States that her knee does not bother her and does not want any pain meds.  She is clearly confused.  She first tells me that she met me yesterday which is not possible I just started and met her today.  Then she tells me that she is at home and she is very scared because there is someone in her basement, banging on the walls.  I  "reoriented her that she is in the hospital she frankly denies that.  I asked her to clarify and she states again she is at home in [place].   Patient is not able to tell me the month date or year.  Tells me that she can go home.  Similar to past reports gets sarcastic and tries to deflect re question she does not like or w specific answers. Shows zero insight to her fracture and denies she has a fx and states that the last provider told her she could go home which is not the case.      -Overall in discussion it is clear that she is likely delirious (on top of prior cog declines), and does not have intact medical decision-making capacity to refuse cares or TCU.  And there is no safe option to send her home at this time.  No hold is needed as non intact MDM.    -Long discussion with the daughter via phone after the visit. Updated regarding fracture and patient's poor insight.   Reports patient can be very \"difficult\", declining cares, poor insight/cognitive impairment..  Advised trialing Seroquel to help with agitation and delirium anxiety.  Risk/benefits reviewed.  They concur.  Daughter/family have struggled with the patient clearly cognitive and functionally worsening at home for some time.  Patient falling so much they now have home cameras, and have multiple evidence of that.  They did work with the primary care regarding patient's declines, but patient had refused all help, and had not yet been able to get a vulnerable adult filed.  They are very worried about the patient and her continued denial of cares and unsafe home status.  At this point advise she is confused, does not appear to have intact medical decision-making capacity and cannot return to home with multiple safety concerns and TCU will be pursued.  We talked that long-term PCP, and social work involvement will help them further meet their mother's support needs.    -OT consulted for mini cog, I suspect outpt followup neurocognitive testing could bring " "out subtle deficits not apparent on brief mini cog.         Physical Exam:     Vitals:    25 0200 25 0850 25 1124 25 1539   BP:  (!) 174/92 (!) 171/94 124/77   BP Location:  Right arm Right arm Left arm   Pulse: 94 98 114 114   Resp: 16 16 15 16   Temp:  98.5  F (36.9  C) 98.5  F (36.9  C) 98.5  F (36.9  C)   TempSrc:  Oral Oral Oral   SpO2: 94%  94% 96%   Weight:       Height:         /77 (BP Location: Left arm)   Pulse 114   Temp 98.5  F (36.9  C) (Oral)   Resp 16   Ht 1.6 m (5' 3\")   Wt 68 kg (150 lb)   SpO2 96%   BMI 26.57 kg/m     Temp (24hrs), Av.5  F (36.9  C), Min:98.5  F (36.9  C), Max:98.5  F (36.9  C)    No intake or output data in the 24 hours ending 25 0812  Patient Vitals for the past 120 hrs:   Weight   25 0702 68 kg (150 lb)       LINES: PIV,   GENERAL APPEARANCE: Alert, supine in bed, in no acute distress.  HEENT: normocephalic, normal external exam.  NECK:  No JVD  CARDIOVASCULAR: Regular rate and rhythm, s1, s2  no murmur appreciated.    RESPIRATORY: No o2, Normal work of breathing. Clear bilaterally without wheezes, fine rales or coarse rhonchi/crackles.   GASTROINTESTINAL:Soft, non-tender,  No masses/organomegaly  NEUROLOGIC: nonfocal, UE movements appear grossly intact.  EXTREMITIES: Right knee in immobilizer, declines exam, needs to remain nonweightbearing on the right leg with immobilizer on at all times unless for cares. no peripheral edema noted.  SKIN: No rashes or lesions.  PSYCHIATRIC: Affect-clearly confused, denying that she has a fracture, denying that she talk to anyone.  Hallucinating that she is at home and there is someone in her basement.  Denying she is confused.  Poor insight.      Meds- reviewed         Data:     I reviewed most recent (and historical) labs in th past 24hrs, and personally reviewed imaging    Recent Labs   Lab Test 25  0911 25  1001   WBC 6.4 7.4   HGB 14.1 15.1   MCV 91 92    271    " "136   POTASSIUM 4.8 4.6   CHLORIDE 103 102   CO2 22 22   ANIONGAP 12 12   GLC 96 117*   BUN 17.5 14.6   CR 1.18* 1.21*   GFRESTIMATED 45* 44*   BRADY 9.7 10.1        CBC with Diff:  Recent Labs   Lab Test 01/13/25  0911 01/12/25  1001 10/09/24  1222   WBC 6.4 7.4 6.7   HGB 14.1 15.1 14.8   MCV 91 92 92    271 257        BMP:  Recent Labs   Lab Test 01/13/25  0911 01/12/25  1001 10/09/24  1222    136 136   POTASSIUM 4.8 4.6 5.0   CHLORIDE 103 102 101   CO2 22 22 21*   ANIONGAP 12 12 14   GLC 96 117* 110*   BUN 17.5 14.6 15.6   CR 1.18* 1.21* 1.29*   GFRESTIMATED 45* 44* 41*   BRADY 9.7 10.1 10.2        Diabetes:  Recent Labs   Lab 01/13/25  0911 01/12/25  1001   GLC 96 117*       Micro results:  UA RESULTS:  Recent Labs   Lab Test 10/09/24  1234 08/12/24  1142 02/14/20  1106   COLOR Yellow   < > Yellow   APPEARANCE Clear   < > Clear   URINEGLC Negative   < > Negative   URINEBILI Negative   < > Small*   URINEKETONE Negative   < > Trace*   SG 1.021   < > 1.025   UBLD Negative   < > Negative   URINEPH 5.5   < > 5.5   PROTEIN 10*   < > Trace*   UROBILINOGEN  --   --  0.2   NITRITE Negative   < > Negative   LEUKEST Negative   < > Small*   RBCU <1   < > 2-5*   WBCU 1   < > 5-10*    < > = values in this interval not displayed.       No components found for: \"URINE\"    30-Day Micro Results       No results found for the last 720 hours.                 IMAGING:   IMAGING, During Current Admission:   CT Knee Right w/o Contrast: 1/12/2025  IMPRESSION: 1.  Acute nondisplaced tibial plateau fracture most pronounced laterally where there is mild impaction along the posterolateral tibial plateau. 2.  Bones are markedly demineralized which limits fracture detection. 3.  Small joint effusion and popliteal cyst with lipohemarthrosis. NOTE: ABNORMAL REPORT THE DICTATION ABOVE DESCRIBES AN ABNORMALITY FOR WHICH FOLLOW-UP IS NEEDED.      XR Knee Right 3 View 1/12/2025  IMPRESSION: Knee: Cortical irregularity posterior aspect " tibial plateaus on lateral view is age indeterminate, more likely not acute. There is no associated joint effusion. Moderate medial and patellofemoral compartment degenerative arthritis. Diffuse bony demineralization. Tibia-fibula: No acute fracture. Diffuse bony demineralization.    XR Tibia and Fibula Right 2 Views 1/12/2025  IMPRESSION: Knee: Cortical irregularity posterior aspect tibial plateaus on lateral view is age indeterminate, more likely not acute. There is no associated joint effusion. Moderate medial and patellofemoral compartment degenerative arthritis. Diffuse bony demineralization. Tibia-fibula: No acute fracture. Diffuse bony demineralization.      IMAGING, in the previous 30d    XR Pelvis w Hip Right 1 View: 12/22/2024  EXAM: XR PELVIS AND HIP RIGHT 1 VIEW LOCATION: [facility] DATE: 12/22/2024 INDICATION: fall, hip pain COMPARISON: 08/12/2024   IMPRESSION: Diffuse demineralization without displaced fracture. 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 Lower lumbar degenerative disc disease.    XR Tibia and Fibula Right 2 Views: 12/22/2024  EXAM: XR TIBIA AND FIBULA RIGHT 2 VIEWS, XR KNEE RIGHT 1/2 VIEWS, XR ANKLE RIGHT G/E 3 VIEWS LOCATION: [facility] DATE: 12/22/2024 INDICATION: Mid shin pain, recent fall COMPARISON: None.   IMPRESSION: Demineralization without displaced fracture. No right knee joint effusion. Moderate to severe medial compartment predominant right knee osteoarthritis. Plantar calcaneal spur.    Ankle XR, G/E 3 views, right  Result Date: 12/22/2024  EXAM: XR TIBIA AND FIBULA RIGHT 2 VIEWS, XR KNEE RIGHT 1/2 VIEWS, XR ANKLE RIGHT G/E 3 VIEWS LOCATION: [facility] DATE: 12/22/2024 INDICATION: Mid shin pain, recent fall COMPARISON: None.   IMPRESSION: Demineralization without displaced fracture. No right knee joint effusion. Moderate to severe medial compartment predominant right knee  osteoarthritis. Plantar calcaneal spur.    XR Knee Right 1/2 Views  Result Date: 12/22/2024  EXAM: XR TIBIA AND FIBULA RIGHT 2 VIEWS, XR KNEE RIGHT 1/2 VIEWS, XR ANKLE RIGHT G/E 3 VIEWS LOCATION: [facility] DATE: 12/22/2024 INDICATION: Mid shin pain, recent fall COMPARISON: None.   IMPRESSION: Demineralization without displaced fracture. No right knee joint effusion. Moderate to severe medial compartment predominant right knee osteoarthritis. Plantar calcaneal spur.            Allergy:     Allergies   Allergen Reactions    Sulfa Antibiotics Unknown    Tetracycline Rash       Medical Decision Making       Please see A&P for additional details of medical decision making.  MANAGEMENT DISCUSSED with the following over the past 24 hours:     NOTE(S)/MEDICAL RECORDS REVIEWED over the past 24 hours:          Please note this documentation was partially completed using [employer] dictation software. It was briefly proofread, but may still have unintended incorrect words or phrases transcribed. Also this is primarily intended as a direct peer to peer communication with medical abbreviations and verbiage that may appear to be 'direct' as to succinctly relay medical info and opinions. If there are any questions on content or diagnosis, please contact me for clarification.

## 2025-01-13 NOTE — PLAN OF CARE
Date/Time: 1/13  Summary: Effusion R knee   Precautions: Fall   Cognitive Concerns/Orientation: A/O to self  Behavior and Aggression Color: Irritable @times, incompliant @times  ABNL VS/O2: WDL's , RA  CMS: RLE active ROM moderately impaired  Edema: +1, +2 RLE  Mobility: NWB RLE w/FWW & gait belt (refusing assistance to ambulate to the commode.)  Pain Management: denies, ice applied  Diet: R; 1/13 poor po intake  Bowel/Bladder: purewick  ABNL Labs/BG: Creat 1.18, GFR 48  Drain/Devices: SL PIV  Telemetry Rhythm: -  Skin: RLE bruising  Tests/Procedures: 1/13 CT Head  Discharge Date: TBD  Plan: TCU  Other Info:   Refusing all medications

## 2025-01-13 NOTE — CONSULTS
Initial consult completed on 1/12/25.     9 TCU referrals sent and pending.    SW following for discharge planning.    ROBINA Joshi  Inpatient   [facility]

## 2025-01-13 NOTE — CONSULTS
[facility]    Orthopedic Consultation    Arline Link MRN# [id number]   Age: 84 year old YOB: 1940     Date of Admission:  1/12/2025    Reason for consult: Right non-displaced lateral tibial plateau fracture       Requesting provider: Dr Hurd       Level of consult: One-time consult to assist in determining a diagnosis, recommend an appropriate treatment plan and place orders           Assessment and Plan:   Assessment:   Right non-displaced lateral tibial plateau fracture      Plan:   Non-operative management recommended  Non-WB Right LE with walker   Keep KI in place at all times beyond hygiene and daily skin checks  Progress in PT as able  Ice to right knee prn pain  ASA 162mg daily x 6 weeks.   Follow-up x-rays in 2 weeks.  These can be done at facility and sent to surgeon.            Chief Complaint:   Right knee pain          History of Present Illness:   HPI obtained via medical records and patient.  Arline Link is a 84 year old female history of hypertension, vitamin D deficiency, hypothyroidism, cognitive impairment, recurrent falls, who presented to the ED with complaints of right leg pain with a history of recurrent falls in her home     Patient presented to the ER with fall reported on 01/12/2025. Reported to live independently in a house. Per report family has tried to convince patient to live in different care environment after recurrent presentations for falling. She is reported to have dementia. Family has numerous video cameras in the house and reported to see her falling on numerous occasions.     Patient states she ambulates with a walker at baseline.  Cannot provide specifics of the fall.            Past Medical History:     Past Medical History:   Diagnosis Date    Benign essential hypertension 2016    started med by endocrine 11/16    Elevated ALT measurement 2015    fu nl 12/16    Hypercholesteremia     Hypothyroid 2012    Dr. Schmitt     Mucinosis of skin     Dr. Navas    Vitamin D deficiency              Past Surgical History:     Past Surgical History:   Procedure Laterality Date    APPENDECTOMY  14    TONSILLECTOMY & ADENOIDECTOMY  5             Social History:     Social History     Tobacco Use    Smoking status: Never    Smokeless tobacco: Never   Substance Use Topics    Alcohol use: No     Alcohol/week: 0.0 standard drinks of alcohol             Family History:     Family History   Problem Relation Age of Onset    Breast Cancer No family hx of              Immunizations:     VACCINE/DOSE   Diptheria   DPT   DTAP   HBIG   Hepatitis A   Hepatitis B   HIB   Influenza   Measles   Meningococcal   MMR   Mumps   Pneumococcal   Polio   Rubella   Small Pox   TDAP   Varicella   Zoster             Allergies:     Allergies   Allergen Reactions    Sulfa Antibiotics Unknown    Tetracycline Rash             Medications:     Current Facility-Administered Medications   Medication Dose Route Frequency Provider Last Rate Last Admin    acetaminophen (TYLENOL) tablet 650 mg  650 mg Oral Q6H Suzanne Hurd MD   650 mg at 01/12/25 2127    calcium carbonate (TUMS) chewable tablet 1,000 mg  1,000 mg Oral 4x Daily PRN Suzanne Hurd MD        haloperidol (HALDOL) tablet 2 mg  2 mg Oral Q6H PRN Reji Duque PA-C        levothyroxine (SYNTHROID/LEVOTHROID) tablet 50 mcg  50 mcg Oral Daily Suzanne Hurd MD   50 mcg at 01/13/25 0926    lidocaine (LMX4) cream   Topical Q1H PRN Suzanne Hurd MD        lidocaine 1 % 0.1-1 mL  0.1-1 mL Other Q1H PRN Suzanne Hurd MD        naloxone (NARCAN) injection 0.2 mg  0.2 mg Intravenous Q2 Min PRN Suzanne Hurd MD        Or    naloxone (NARCAN) injection 0.4 mg  0.4 mg Intravenous Q2 Min PRSuzanne Cheema MD        Or    naloxone (NARCAN) injection 0.2 mg  0.2 mg Intramuscular Q2 Min PRN Suzanne Hurd MD        Or    naloxone (NARCAN) injection 0.4 mg  0.4 mg Intramuscular Q2 Min PRN Vannessa  Suzanne SWEET MD        oxyCODONE (ROXICODONE) tablet 5 mg  5 mg Oral Q6H PRN Suzanne Hurd MD        QUEtiapine (SEROquel) tablet 25 mg  25 mg Oral Q6H PRN Reji Duque PA-C        senna-docusate (SENOKOT-S/PERICOLACE) 8.6-50 MG per tablet 1 tablet  1 tablet Oral BID PRN Suzanne Hurd MD        Or    senna-docusate (SENOKOT-S/PERICOLACE) 8.6-50 MG per tablet 2 tablet  2 tablet Oral BID PRSuzanne Cheema MD        sodium chloride (PF) 0.9% PF flush 3 mL  3 mL Intracatheter Q8H Suzanne Hurd MD   3 mL at 01/13/25 0331    sodium chloride (PF) 0.9% PF flush 3 mL  3 mL Intracatheter q1 min Suzanne Sargent MD                 Review of Systems:   ROS:  10 point ROS neg other than the symptoms noted above in the HPI.            Physical Exam:   All vitals have been reviewed  Patient Vitals for the past 24 hrs:   BP Temp Temp src Pulse Resp SpO2   01/13/25 0850 (!) 174/92 98.5  F (36.9  C) Oral 98 16 --   01/13/25 0200 -- -- -- 94 16 94 %   01/12/25 2130 (!) 148/75 98.5  F (36.9  C) Axillary 97 16 94 %   01/12/25 1936 -- -- -- -- -- 96 %   01/12/25 1740 -- -- -- -- -- 97 %   01/12/25 1738 (!) 132/91 -- -- 106 -- --   01/12/25 1453 -- -- -- -- -- 97 %   01/12/25 1435 (!) 146/95 -- -- 105 -- 98 %   01/12/25 1041 -- -- -- -- -- 98 %   01/12/25 1040 (!) 127/93 -- -- 104 -- --   01/12/25 1019 -- -- -- -- -- 97 %     No intake or output data in the 24 hours ending 01/13/25 1010      Physical Exam   Temp: 98.5  F (36.9  C) Temp src: Oral BP: (!) 174/92 Pulse: 98   Resp: 16 SpO2: 94 % O2 Device: None (Room air)    Vital Signs with Ranges  Temp:  [98.5  F (36.9  C)] 98.5  F (36.9  C)  Pulse:  [] 98  Resp:  [16] 16  BP: (127-174)/(75-95) 174/92  SpO2:  [94 %-98 %] 94 %  150 lbs 0 oz    Constitutional: Pleasant, alert, able to follow commands   HEENT: Head atraumatic normocephalic. Pupils equal round and reactive to light.  Poor dentition.   Respiratory: Unlabored breathing no audible  wheeze  Cardiovascular: Regular rate and rhythm per pulses  GI: Abdomen non-distended.  Lymph/Hematologic: No lymphadenopathy in areas examined  Genitourinary:  No pedro  Skin: No rashes, no cyanosis, no edema.  Musculoskeletal: On physical exam of the right knee:   Skin: intact, no erythema or ecchymosis  Swelling: mild right knee joint effusion present.   Alignment: No deformity  Tenderness to palpation along proximal/lateral tibia.  Denies medial tenderness.    ROM: 0-50, limited due to discomfort   Dorsi/Plantar flexion: 5/5, limited right ankle motion bilaterally, right decreased more than left  Bilateral cavus foot   Calves:  Soft and non-tender  Distal pulses are intact and equal bilaterally  Sensation to light touch intact and equal bilaterally.     Neurologic: normal without focal findings  Neuropsychiatric: stable             Data:   All laboratory data reviewed  Results for orders placed or performed during the hospital encounter of 01/12/25   XR Knee Right 3 Views     Status: None    Narrative    EXAM: XR KNEE RIGHT 3 VIEWS, XR TIBIA AND FIBULA RIGHT 2 VIEWS  LOCATION: [facility]  DATE: 1/12/2025    INDICATION: Fall, pain, concern for fracture  COMPARISON: None.      Impression    IMPRESSION:   Knee: Cortical irregularity posterior aspect tibial plateaus on lateral view is age indeterminate, more likely not acute. There is no associated joint effusion. Moderate medial and patellofemoral compartment degenerative arthritis. Diffuse bony   demineralization.     Tibia-fibula: No acute fracture. Diffuse bony demineralization.   XR Tibia and Fibula Right 2 Views     Status: None    Narrative    EXAM: XR KNEE RIGHT 3 VIEWS, XR TIBIA AND FIBULA RIGHT 2 VIEWS  LOCATION: [facility]  DATE: 1/12/2025    INDICATION: Fall, pain, concern for fracture  COMPARISON: None.      Impression    IMPRESSION:   Knee: Cortical irregularity posterior aspect tibial plateaus on  lateral view is age indeterminate, more likely not acute. There is no associated joint effusion. Moderate medial and patellofemoral compartment degenerative arthritis. Diffuse bony   demineralization.     Tibia-fibula: No acute fracture. Diffuse bony demineralization.   CT Knee Right w/o Contrast     Status: None    Narrative    EXAM: CT KNEE RIGHT W/O CONTRAST  LOCATION: [facility]  DATE: 1/12/2025    INDICATION: Fall, evaluate for possible acute tibial plateau fracture.  COMPARISON: Same day radiograph.  TECHNIQUE: Noncontrast. Axial, sagittal and coronal thin-section reconstruction. Dose reduction techniques were used.     FINDINGS:     BONES:  -Bones are markedly demineralized which limits fracture detection.     -Acute to subacute mildly impacted fracture along the lateral tibial plateau which shows posterior sloping as seen on sagittal image 26. There is some impaction and curvilinear sclerosis. There is suggestion of the fracture extending across midline where   there is a vague longitudinal lucency through the posteromedial tibial plateau seen on coronal image 34.    -Tricompartment degenerative changes right knee which are moderate in the medial compartment.    SOFT TISSUES:  -Small joint effusion with probable lipohemarthrosis. Mild scattered atherosclerotic vascular calcifications. Small amount of fluid/blood products along the posterior aspect of the knee. Small popliteal cyst with lipohemarthrosis.      Impression    IMPRESSION:  1.  Acute nondisplaced tibial plateau fracture most pronounced laterally where there is mild impaction along the posterolateral tibial plateau.    2.  Bones are markedly demineralized which limits fracture detection.    3.  Small joint effusion and popliteal cyst with lipohemarthrosis.    NOTE: ABNORMAL REPORT    THE DICTATION ABOVE DESCRIBES AN ABNORMALITY FOR WHICH FOLLOW-UP IS NEEDED.     Basic metabolic panel     Status: Abnormal   Result Value Ref  Range    Sodium 136 135 - 145 mmol/L    Potassium 4.6 3.4 - 5.3 mmol/L    Chloride 102 98 - 107 mmol/L    Carbon Dioxide (CO2) 22 22 - 29 mmol/L    Anion Gap 12 7 - 15 mmol/L    Urea Nitrogen 14.6 8.0 - 23.0 mg/dL    Creatinine 1.21 (H) 0.51 - 0.95 mg/dL    GFR Estimate 44 (L) >60 mL/min/1.73m2    Calcium 10.1 8.8 - 10.4 mg/dL    Glucose 117 (H) 70 - 99 mg/dL   CBC with platelets and differential     Status: None   Result Value Ref Range    WBC Count 7.4 4.0 - 11.0 10e3/uL    RBC Count 4.86 3.80 - 5.20 10e6/uL    Hemoglobin 15.1 11.7 - 15.7 g/dL    Hematocrit 44.8 35.0 - 47.0 %    MCV 92 78 - 100 fL    MCH 31.1 26.5 - 33.0 pg    MCHC 33.7 31.5 - 36.5 g/dL    RDW 13.7 10.0 - 15.0 %    Platelet Count 271 150 - 450 10e3/uL    % Neutrophils 75 %    % Lymphocytes 18 %    % Monocytes 5 %    % Eosinophils 1 %    % Basophils 1 %    % Immature Granulocytes 1 %    NRBCs per 100 WBC 0 <1 /100    Absolute Neutrophils 5.5 1.6 - 8.3 10e3/uL    Absolute Lymphocytes 1.3 0.8 - 5.3 10e3/uL    Absolute Monocytes 0.4 0.0 - 1.3 10e3/uL    Absolute Eosinophils 0.0 0.0 - 0.7 10e3/uL    Absolute Basophils 0.1 0.0 - 0.2 10e3/uL    Absolute Immature Granulocytes 0.1 <=0.4 10e3/uL    Absolute NRBCs 0.0 10e3/uL   TSH     Status: Normal   Result Value Ref Range    TSH 2.72 0.30 - 4.20 uIU/mL   Comprehensive metabolic panel     Status: Abnormal   Result Value Ref Range    Sodium 137 135 - 145 mmol/L    Potassium 4.8 3.4 - 5.3 mmol/L    Carbon Dioxide (CO2) 22 22 - 29 mmol/L    Anion Gap 12 7 - 15 mmol/L    Urea Nitrogen 17.5 8.0 - 23.0 mg/dL    Creatinine 1.18 (H) 0.51 - 0.95 mg/dL    GFR Estimate 45 (L) >60 mL/min/1.73m2    Calcium 9.7 8.8 - 10.4 mg/dL    Chloride 103 98 - 107 mmol/L    Glucose 96 70 - 99 mg/dL    Alkaline Phosphatase 105 40 - 150 U/L    AST 35 0 - 45 U/L    ALT 19 0 - 50 U/L    Protein Total 6.9 6.4 - 8.3 g/dL    Albumin 3.9 3.5 - 5.2 g/dL    Bilirubin Total 1.2 <=1.2 mg/dL   CBC with platelets     Status: Normal   Result  Value Ref Range    WBC Count 6.4 4.0 - 11.0 10e3/uL    RBC Count 4.46 3.80 - 5.20 10e6/uL    Hemoglobin 14.1 11.7 - 15.7 g/dL    Hematocrit 40.6 35.0 - 47.0 %    MCV 91 78 - 100 fL    MCH 31.6 26.5 - 33.0 pg    MCHC 34.7 31.5 - 36.5 g/dL    RDW 13.5 10.0 - 15.0 %    Platelet Count 233 150 - 450 10e3/uL   EKG 12-lead, tracing only     Status: None (Preliminary result)   Result Value Ref Range    Systolic Blood Pressure  mmHg    Diastolic Blood Pressure  mmHg    Ventricular Rate 95 BPM    Atrial Rate 95 BPM    OH Interval 138 ms    QRS Duration 76 ms     ms    QTc 449 ms    P Axis -6 degrees    R AXIS 14 degrees    T Axis 265 degrees    Interpretation ECG       Sinus rhythm  Nonspecific T wave abnormality  Abnormal ECG  No previous ECGs available     CBC with platelets differential     Status: None    Narrative    The following orders were created for panel order CBC with platelets differential.  Procedure                               Abnormality         Status                     ---------                               -----------         ------                     CBC with platelets and d...[[id number]]                      Final result                 Please view results for these tests on the individual orders.          Attestation:  I have reviewed today's vital signs, notes, medications, labs and imaging with Dr. Garcia.  Amount of time performed on this consult: 50 minutes.    Nat Winter PA-C

## 2025-01-13 NOTE — DISCHARGE INSTRUCTIONS
Right non-displaced lateral tibial plateau fracture      Plan:   Non-operative management recommended  Non-WB Right LE with walker   Keep KI in place at all times beyond hygiene and skin checks  Progress in PT as able  Ice to right knee prn pain  ASA 162mg daily x 6 weeks.   Follow-up x-rays in 2 weeks.  These can be done at facility and sent to surgeon.   Dr Garcia contact coordinator number:  [phone]

## 2025-01-14 LAB
ANION GAP SERPL CALCULATED.3IONS-SCNC: 15 MMOL/L (ref 7–15)
BUN SERPL-MCNC: 22.7 MG/DL (ref 8–23)
CALCIUM SERPL-MCNC: 9.8 MG/DL (ref 8.8–10.4)
CHLORIDE SERPL-SCNC: 104 MMOL/L (ref 98–107)
CREAT SERPL-MCNC: 1.17 MG/DL (ref 0.51–0.95)
EGFRCR SERPLBLD CKD-EPI 2021: 46 ML/MIN/1.73M2
ERYTHROCYTE [DISTWIDTH] IN BLOOD BY AUTOMATED COUNT: 13.7 % (ref 10–15)
GLUCOSE SERPL-MCNC: 73 MG/DL (ref 70–99)
HCO3 SERPL-SCNC: 19 MMOL/L (ref 22–29)
HCT VFR BLD AUTO: 43.2 % (ref 35–47)
HGB BLD-MCNC: 13.5 G/DL (ref 11.7–15.7)
HGB BLD-MCNC: 14.3 G/DL (ref 11.7–15.7)
HGB BLD-MCNC: 14.5 G/DL (ref 11.7–15.7)
MCH RBC QN AUTO: 31.3 PG (ref 26.5–33)
MCHC RBC AUTO-ENTMCNC: 33.6 G/DL (ref 31.5–36.5)
MCV RBC AUTO: 93 FL (ref 78–100)
PLATELET # BLD AUTO: 232 10E3/UL (ref 150–450)
POTASSIUM SERPL-SCNC: 4.6 MMOL/L (ref 3.4–5.3)
RBC # BLD AUTO: 4.63 10E6/UL (ref 3.8–5.2)
SODIUM SERPL-SCNC: 138 MMOL/L (ref 135–145)
WBC # BLD AUTO: 6.8 10E3/UL (ref 4–11)

## 2025-01-14 PROCEDURE — 36415 COLL VENOUS BLD VENIPUNCTURE: CPT | Performed by: HOSPITALIST

## 2025-01-14 PROCEDURE — 250N000013 HC RX MED GY IP 250 OP 250 PS 637: Performed by: INTERNAL MEDICINE

## 2025-01-14 PROCEDURE — 36415 COLL VENOUS BLD VENIPUNCTURE: CPT | Performed by: PHYSICIAN ASSISTANT

## 2025-01-14 PROCEDURE — 82374 ASSAY BLOOD CARBON DIOXIDE: CPT | Performed by: PHYSICIAN ASSISTANT

## 2025-01-14 PROCEDURE — 85018 HEMOGLOBIN: CPT | Performed by: HOSPITALIST

## 2025-01-14 PROCEDURE — 82272 OCCULT BLD FECES 1-3 TESTS: CPT | Performed by: HOSPITALIST

## 2025-01-14 PROCEDURE — 85014 HEMATOCRIT: CPT | Performed by: PHYSICIAN ASSISTANT

## 2025-01-14 PROCEDURE — 999N000111 HC STATISTIC OT IP EVAL DEFER

## 2025-01-14 PROCEDURE — 120N000001 HC R&B MED SURG/OB

## 2025-01-14 PROCEDURE — 99232 SBSQ HOSP IP/OBS MODERATE 35: CPT | Performed by: HOSPITALIST

## 2025-01-14 PROCEDURE — 84132 ASSAY OF SERUM POTASSIUM: CPT | Performed by: PHYSICIAN ASSISTANT

## 2025-01-14 RX ORDER — PANTOPRAZOLE SODIUM 40 MG/1
40 TABLET, DELAYED RELEASE ORAL
Status: DISCONTINUED | OUTPATIENT
Start: 2025-01-14 | End: 2025-02-03 | Stop reason: HOSPADM

## 2025-01-14 RX ADMIN — LEVOTHYROXINE SODIUM 50 MCG: 50 TABLET ORAL at 08:07

## 2025-01-14 ASSESSMENT — ACTIVITIES OF DAILY LIVING (ADL)
ADLS_ACUITY_SCORE: 48
ADLS_ACUITY_SCORE: 43
ADLS_ACUITY_SCORE: 43
ADLS_ACUITY_SCORE: 48
ADLS_ACUITY_SCORE: 43
ADLS_ACUITY_SCORE: 43
ADLS_ACUITY_SCORE: 48
ADLS_ACUITY_SCORE: 48
ADLS_ACUITY_SCORE: 43
ADLS_ACUITY_SCORE: 52
ADLS_ACUITY_SCORE: 48
ADLS_ACUITY_SCORE: 48
ADLS_ACUITY_SCORE: 43
ADLS_ACUITY_SCORE: 43
ADLS_ACUITY_SCORE: 48
ADLS_ACUITY_SCORE: 43
ADLS_ACUITY_SCORE: 48
ADLS_ACUITY_SCORE: 43

## 2025-01-14 NOTE — PROVIDER NOTIFICATION
MD Notification    Notified Person: MD    Notified Person Name: GABRIELLA BAHENA    Notification Date/Time 1- 2245:    Notification Interaction:AMCOM     Purpose of Notification:  pt refused vital signs, refused oral meds, refused dinner and poor intake urine output 100 cc with strong order and color  dark rudolph.   Orders Received:    Comments:

## 2025-01-14 NOTE — PROGRESS NOTES
[facility]    Medicine Progress Note - Hospitalist Service    Date of Admission:  1/12/2025    Assessment & Plan   Arline Link is a 84 year old, female, w a PMH of cognitive decline per family, rule out dementia, HTN, vitamin D deficiency, hypothyroidism, multiple falls, who was admitted on 1/12/2025, after another fall at home and found to have a right tibial plateau fracture on CT.    Fall with Closed fracture of right tibial plateau, initial encounter  Effusion of right knee  Weakness generalized    Falls frequently  Admitted with acute pain after a fall, history of frequent falls, such that family has a home camera.  X-ray suggested and CT confirmed tibial plateau fracture.  Orthopedics consulted.  Nonoperative.  Recommends nonweightbearing RLE, with walker use.  Unable to discharge home, safety concerns, patient denies pain.  Admitted to inpatient  CT right knee confirm nondisplaced tibial plateau fracture, lateral mild impaction.  Orthopedic Surgery consulted, appreciate their assistance.  Non-operative management recommended.  Non-WB Right LE with walker.  Keep KI in place at all times beyond hygiene and daily skin checks.  Progress in PT as able.  Ice to right knee prn pain.  ASA 162mg daily x 6 weeks.   Follow-up x-rays in 2 weeks.  These can be done at facility and sent to surgeon.  Pain control with acetaminophen, note patient declined.  PRN oxycodone available, however caution with opiates due to delirium risk.  PT/OT/Care transitions consulted.  Plan for TCU at time of discharge.     Neuro cognitive impairment with behavioral disturbance  Acute metabolic encephalopathy on suspected worsening dementia  Per chart review Hx progressive neurocognitive impairment.  Lives alone.  Irritable, angry demanding and sarcastic outpatient at past visits.  Due to worsening cognition, appears doubtful she can return to home safely alone.daughter has been very worried about the patient's  progressive fall is progressive cognitive impairment, with history favoring worsening dementia, and patient declining intervention/eval.  1/13- haldol x 2 yest day, refusing APAp, cares overnight. Today is hallucinating at home, frightened someone in basement, denies is confused or has fx. denies she has any problems.  Does not have intact MDM to decline TCU or cares in my opinion. D/w , support [facility] filed for care refusals/home if not done prior.  Discussed with daughter.acute metabolic encephalopathy , trail Seroquel scheduled. Add CTH  Trial Seroquel afternoon plus HS to help with anxiety/agitation/delirium.  Seroquel as first as needed for worsening behaviors.  Haldol 2 mg Q6 as second prn.  EKG on 1/13 showed normal QTc.  OT consulted for cognitive evaluation, patient refused this.  Patient does not have intact MDM at this time to refuse cares or site.  CT head (screening/none recent) ordered, not yet obtained.  Discussed with social work, unable to go home - Plan for TCU.  [facility] report was filed on arrival which I concur with.  Daughter will pursue [facility] outpatient supports.     History of hypothyroidism  TSH recheck WNL 2.72  Continue PTA levothyroxine.     History of hypertension  By chart review, ?taking losartan 50 daily.  Blood pressure somewhat labile, relatively low this morning at 100/76.  Continue to hold PTA losartan for now.     History of hypercholesterolemia  Not on a statin prior to admission.  Follow up with primary care provider.    Addendum:  Notified by nursing the patient has had 2 loose dark black bowel movements today.  No abdominal pain or nausea.  Vitals okay.  Repeat hemoglobin stable.  Patient denies any prior history of GI bleed, although history is limited due to suspected dementia.  Will recheck hemoglobin this evening and in AM.  Monitor for any further bowel movements, will send for occult blood.  Start Protonix.  Hold prophylactic aspirin for now.          Diet: Regular Diet  "Adult    DVT Prophylaxis: aspirin per Orthopedic Surgery  Souza Catheter: Not present  Lines: None     Cardiac Monitoring: None  Code Status: Full Code      Clinically Significant Risk Factors                   # Hypertension: Noted on problem list            # Overweight: Estimated body mass index is 26.57 kg/m  as calculated from the following:    Height as of this encounter: 1.6 m (5' 3\").    Weight as of this encounter: 68 kg (150 lb)., PRESENT ON ADMISSION     # Financial/Environmental Concerns: none         Social Drivers of Health            Disposition Plan     Medically Ready for Discharge: Anticipated Tomorrow pending TCU placement         Benson Coleman MD  Hospitalist Service  [facility]  Securely message with [employer] (more info)  Text page via [employer] Paging/Directory   ______________________________________________________________________    Interval History   Arline Link was seen this morning.  She feels pretty good.  No specific complaints/concerns for me.  Denies pain.  No fevers, chest pain, shortness of breath, nausea, abdominal pain.  Able to recall that she fell and hurt her leg, but states that her leg is not broken.  Difficult to redirect.  Plan of care discussed with bedside nurse.    Physical Exam   Vital Signs: Temp: 98.1  F (36.7  C) Temp src: Oral BP: 100/76 Pulse: 84   Resp: 18 SpO2: 96 % O2 Device: None (Room air)    Weight: 150 lbs 0 oz    Constitutional: awake, alert, cooperative, no apparent distress, laying in the hospital bed  Respiratory: no increased work of breathing, clear to auscultation bilaterally, no crackles or wheezing  Cardiovascular: regular rate and rhythm, normal S1 and S2, no murmur noted  GI: normal bowel sounds, soft, non-distended, non-tender  Skin: warm, dry  Musculoskeletal: no lower extremity pitting edema present, right knee immobilizer in place  Neurologic: awake, alert, oriented year, month, hospital, remembers she fell and " hurt her leg, states her leg doesn't hurt and it isn't broken - she doesn't listen to any discussion about her tibial plateau fracture, moves all extremities    Medical Decision Making       40 MINUTES SPENT BY ME on the date of service doing chart review, history, exam, documentation & further activities per the note.      Data     I have personally reviewed the following data over the past 24 hrs:    6.8  \   14.5   / 232     138 104 22.7 /  73   4.6 19 (L) 1.17 (H) \     ALT: N/A AST: N/A AP: N/A TBILI: N/A   ALB: N/A TOT PROTEIN: N/A LIPASE: N/A

## 2025-01-14 NOTE — PLAN OF CARE
Diagnosis: Right tib plateau fx - non op,   Mental Status: A&O x 3-4 forgetful, confusion  Activity/dangle turn and repo - NWB to RLE  Diet: regular  Pain:  denies/refuses  Souza/Voiding: incontinent  Tele/Restraints/Iso: NA  02/LDA: SL  D/C Date: ? Pending placement  Other Info: immobilizer to RLE AAT, can be agitated at times.  Black stool, MD aware - HGB check at 1500.

## 2025-01-14 NOTE — PLAN OF CARE
Occupational Therapy: Orders received. Chart reviewed and discussed with care team.? Occupational Therapy not indicated due to limited functional mobility, cognition, and activity tolerance. Patient is refusing cares, so deferring occupational therapy to next level of care.? Defer discharge recommendations to physical therapy.? Will complete orders.

## 2025-01-14 NOTE — PLAN OF CARE
Goal Outcome Evaluation:       Date/Time 1/14 1500-1930    Trauma/Ortho/Medical (Choose one) Trauma    Diagnosis:R tib/fib fx, nonoperative  Mental Status:4, forgetful at times  Activity/dangle:  turn and repo, non-wt bearing  Diet: Reg  Pain: n/a  Souza/Voiding: incontinence  Tele/Restraints/Iso: n/a  02/LDA: sl  D/C Date: pending  Other Info:Pt needs stool culture.

## 2025-01-14 NOTE — PLAN OF CARE
Goal Outcome Evaluation:                      Summary:      1/14 /25 [id number]  Diagnosis: Effusion R Knee and fraction of right Tibia:   POD: non surgical   Orientation: A/O to self   Pain managed: Denies, denies oral meds   Vitals/Tele: VSS on RA  IV Access/drains: R iV saline lock   Diet: Regular, poor intake, encourage to drink   Mobility: not oob yet   GI/: purewick, loose stool x2  Wound/Skin: groin area rudeness, and coccyx   Consults: PT  Discharge Plan: Pending       See Flow sheets for assessment

## 2025-01-15 LAB
ANION GAP SERPL CALCULATED.3IONS-SCNC: 10 MMOL/L (ref 7–15)
BUN SERPL-MCNC: 22.6 MG/DL (ref 8–23)
CALCIUM SERPL-MCNC: 9.3 MG/DL (ref 8.8–10.4)
CHLORIDE SERPL-SCNC: 100 MMOL/L (ref 98–107)
CREAT SERPL-MCNC: 1.03 MG/DL (ref 0.51–0.95)
EGFRCR SERPLBLD CKD-EPI 2021: 53 ML/MIN/1.73M2
ERYTHROCYTE [DISTWIDTH] IN BLOOD BY AUTOMATED COUNT: 13.2 % (ref 10–15)
GLUCOSE SERPL-MCNC: 94 MG/DL (ref 70–99)
HCO3 SERPL-SCNC: 20 MMOL/L (ref 22–29)
HCT VFR BLD AUTO: 39.7 % (ref 35–47)
HEMOCCULT STL QL: NEGATIVE
HGB BLD-MCNC: 13.5 G/DL (ref 11.7–15.7)
HGB BLD-MCNC: 14.6 G/DL (ref 11.7–15.7)
MCH RBC QN AUTO: 31 PG (ref 26.5–33)
MCHC RBC AUTO-ENTMCNC: 34 G/DL (ref 31.5–36.5)
MCV RBC AUTO: 91 FL (ref 78–100)
PLATELET # BLD AUTO: 198 10E3/UL (ref 150–450)
POTASSIUM SERPL-SCNC: 4.3 MMOL/L (ref 3.4–5.3)
RBC # BLD AUTO: 4.35 10E6/UL (ref 3.8–5.2)
SODIUM SERPL-SCNC: 130 MMOL/L (ref 135–145)
WBC # BLD AUTO: 5.2 10E3/UL (ref 4–11)

## 2025-01-15 PROCEDURE — 85014 HEMATOCRIT: CPT | Performed by: HOSPITALIST

## 2025-01-15 PROCEDURE — 120N000001 HC R&B MED SURG/OB

## 2025-01-15 PROCEDURE — 36415 COLL VENOUS BLD VENIPUNCTURE: CPT | Performed by: HOSPITALIST

## 2025-01-15 PROCEDURE — 85018 HEMOGLOBIN: CPT | Performed by: HOSPITALIST

## 2025-01-15 PROCEDURE — 99232 SBSQ HOSP IP/OBS MODERATE 35: CPT | Performed by: HOSPITALIST

## 2025-01-15 PROCEDURE — 80048 BASIC METABOLIC PNL TOTAL CA: CPT | Performed by: HOSPITALIST

## 2025-01-15 PROCEDURE — 85041 AUTOMATED RBC COUNT: CPT | Performed by: HOSPITALIST

## 2025-01-15 PROCEDURE — 250N000013 HC RX MED GY IP 250 OP 250 PS 637: Performed by: INTERNAL MEDICINE

## 2025-01-15 PROCEDURE — 250N000013 HC RX MED GY IP 250 OP 250 PS 637: Performed by: HOSPITALIST

## 2025-01-15 PROCEDURE — 82310 ASSAY OF CALCIUM: CPT | Performed by: HOSPITALIST

## 2025-01-15 RX ORDER — ASPIRIN 81 MG/1
162 TABLET ORAL DAILY
Status: DISCONTINUED | OUTPATIENT
Start: 2025-01-15 | End: 2025-02-03 | Stop reason: HOSPADM

## 2025-01-15 RX ADMIN — PANTOPRAZOLE SODIUM 40 MG: 40 TABLET, DELAYED RELEASE ORAL at 07:49

## 2025-01-15 RX ADMIN — LEVOTHYROXINE SODIUM 50 MCG: 50 TABLET ORAL at 07:50

## 2025-01-15 RX ADMIN — ACETAMINOPHEN 325 MG: 325 TABLET, FILM COATED ORAL at 06:28

## 2025-01-15 ASSESSMENT — ACTIVITIES OF DAILY LIVING (ADL)
ADLS_ACUITY_SCORE: 56
ADLS_ACUITY_SCORE: 52
ADLS_ACUITY_SCORE: 56
ADLS_ACUITY_SCORE: 56
ADLS_ACUITY_SCORE: 52
ADLS_ACUITY_SCORE: 56
ADLS_ACUITY_SCORE: 52
ADLS_ACUITY_SCORE: 56
ADLS_ACUITY_SCORE: 52
ADLS_ACUITY_SCORE: 52
ADLS_ACUITY_SCORE: 56
ADLS_ACUITY_SCORE: 56

## 2025-01-15 NOTE — PLAN OF CARE
Goal Outcome Evaluation:      Plan of Care Reviewed With: patient    Overall Patient Progress: improvingOverall Patient Progress: improving      Date/Time: 1/14/25 8348-6152    Trauma/Ortho/Medical (Choose one) Trauma    Diagnosis:Right non-displaced lateral tibial plateau fracture, non-operative management  Mental Status:A and O x 2, disoriented to time and place, Intermittent confusion, forgetful  Activity/dangle: Turned and repositioned, NWB RLE  Diet: Regular  Pain: Took 1 Tylenol 325 mg  Souza/Voiding: Incontinent of both bowel and bladder.  Tele/Restraints/Iso:N/A  02/LDA: On RA. PIV SL on the R antecubital area.  D/C Date: Pending TCU placement  Other Info:Stool occult negative. VSS. Refused scheduled meds. Immobilizer on the RLE on @ all times.

## 2025-01-15 NOTE — PROGRESS NOTES
Care Management Follow Up    Length of Stay (days): 3    Expected Discharge Date: 01/16/2025     Concerns to be Addressed: discharge planning     Patient plan of care discussed at interdisciplinary rounds: Yes    Anticipated Discharge Disposition: Skilled Nursing Facility              Anticipated Discharge Services: None  Anticipated Discharge DME: None    Patient/family educated on Medicare website which has current facility and service quality ratings: no  Education Provided on the Discharge Plan: Yes  Patient/Family in Agreement with the Plan: yes    Referrals Placed by CM/SW: Post Acute Facilities  Private pay costs discussed: Not applicable    Discussed  Partnership in Safe Discharge Planning  document with patient/family: No     Handoff Completed: No, handoff not indicated or clinically appropriate    Additional Information:  Writer placed phone call to [facility]. No answer. Writer left voicemail asking for call back.  Writer sent message to [facility] liaison to see if any facility has ability to accommodate pt as the referral is pending. Writer awaiting tcu responses.    Addendum: Writer received voicemail from [facility] who states they are unable to accept pt.     Next Steps: awaiting pt to be medically stable; awaiting accepting tcu.    Kenna Clark, SHMUELW  Social Work  [facility]

## 2025-01-15 NOTE — PROGRESS NOTES
[facility]    Medicine Progress Note - Hospitalist Service    Date of Admission:  1/12/2025    Assessment & Plan   Arline Link is a 84 year old, female, w a PMH of cognitive decline per family, rule out dementia, HTN, vitamin D deficiency, hypothyroidism, multiple falls, who was admitted on 1/12/2025, after another fall at home and found to have a right tibial plateau fracture on CT.    Fall with Closed fracture of right tibial plateau, initial encounter  Effusion of right knee  Weakness generalized    Falls frequently  Admitted with acute pain after a fall, history of frequent falls, such that family has a home camera.  X-ray suggested and CT confirmed tibial plateau fracture.  Orthopedics consulted.  Nonoperative.  Recommends nonweightbearing RLE, with walker use.  Unable to discharge home, safety concerns, patient denies pain.  Admitted to inpatient  CT right knee confirm nondisplaced tibial plateau fracture, lateral mild impaction.  Orthopedic Surgery consulted, appreciate their assistance.  Non-operative management recommended.  Non-WB Right LE with walker.  Keep KI in place at all times beyond hygiene and daily skin checks.  Progress in PT as able.  Ice to right knee prn pain.  ASA 162mg daily x 6 weeks.   Follow-up x-rays in 2 weeks.  These can be done at facility and sent to surgeon.  Pain control with acetaminophen, note patient frequently declines.  PRN oxycodone available, however caution with opiates due to delirium risk.  PT/OT/Care transitions consulted.  Plan for TCU at time of discharge.  Anticipate she would be ready for discharge tomorrow if hemoglobin stable and no signs of GI bleed; sodium improved; TCU bed available.     Neuro cognitive impairment with behavioral disturbance  Acute metabolic encephalopathy on suspected worsening dementia  Per chart review Hx progressive neurocognitive impairment.  Lives alone.  Irritable, angry demanding and sarcastic outpatient at  past visits.  Due to worsening cognition, appears doubtful she can return to home safely alone.daughter has been very worried about the patient's progressive fall is progressive cognitive impairment, with history favoring worsening dementia, and patient declining intervention/eval.  1/13- haldol x 2 yest day, refusing APAp, cares overnight. Today is hallucinating at home, frightened someone in basement, denies is confused or has fx. denies she has any problems.  Does not have intact MDM to decline TCU or cares in my opinion. D/w , support [facility] filed for care refusals/home if not done prior.  Discussed with daughter.acute metabolic encephalopathy , trail Seroquel scheduled. Add CTH  Trial Seroquel afternoon plus HS to help with anxiety/agitation/delirium.  Seroquel as first as needed for worsening behaviors.  Haldol 2 mg Q6 as second prn.  EKG on 1/13 showed normal QTc.  OT consulted for cognitive evaluation, patient refused this.  Concern about ability to make medical decisions.  She seems to be oriented, although continues to dispute details about her medical care (doesn't believe her leg is broken).  Medical decision making ability would need to be assessed again if patient demands to leave the hospital or refuses cares/appropriate disposition.  CT head (screening/none recent) ordered, but patient refused and this was discontinued.  Discussed with social work, unable to go home - Plan for TCU.  [facility] report was filed on arrival which I concur with.  Daughter will pursue VA/supports outpatient supports.  Plan of care discussed with family on 1/15/25 in the patient's hospital room.    Possible GI bleed  Notified by nursing the patient has had 2 loose dark black bowel movements on 1/14/25.  No abdominal pain or nausea.  Vitals okay.  Repeat hemoglobin stable.  Patient denied any prior history of GI bleed, although history may be limited due to suspected dementia.  Serial hemoglobin slightly lower at 13.5 on 1/15/25.   "Recheck this evening and in AM.  Stool negative for occult blood on 1/14/25.  No further bowel movements since 1/14/25.  Monitor for further bowel movements and any signs of bleeding.  Started protonoix on 1/14/25, continue the same for now.  Resume prophylactic aspirin.    Hyponatremia  Mild, asymptomatic.  Encourage PO intake today.  BMP in AM.    History of hypothyroidism  TSH recheck WNL 2.72  Continue PTA levothyroxine.     History of hypertension  By chart review, ?taking losartan 50 daily.  Blood pressure somewhat labile.  Continue to hold PTA losartan for now, consider restarting tomorrow if blood pressure persistently elevated.     History of hypercholesterolemia  Not on a statin prior to admission.  Follow up with primary care provider.        Diet: Regular Diet Adult    DVT Prophylaxis: aspirin per Orthopedic Surgery  Souza Catheter: Not present  Lines: None     Cardiac Monitoring: None  Code Status: Full Code      Clinically Significant Risk Factors         # Hyponatremia: Lowest Na = 130 mmol/L in last 2 days, will monitor as appropriate           # Hypertension: Noted on problem list            # Overweight: Estimated body mass index is 26.57 kg/m  as calculated from the following:    Height as of this encounter: 1.6 m (5' 3\").    Weight as of this encounter: 68 kg (150 lb)., PRESENT ON ADMISSION     # Financial/Environmental Concerns: none         Social Drivers of Health            Disposition Plan     Medically Ready for Discharge: Anticipated Tomorrow pending              Benson Coleman MD  Hospitalist Service  [facility]  Securely message with [employer] (more info)  Text page via [employer] Paging/Directory   ______________________________________________________________________    Interval History   Arline DÍAZ Kasandraeleuterioar was seen this morning.  She feels OK, only complaint is being in the hospital.  Continues to believe that her leg is not broken despite any evidence presented to " her.  Denies any pain in her leg.  No fevers, chest pain, shortness of breath, nausea, abdominal pain.  No bowel movements since yesterday.  Family in the room with her, discussed plan of care.    Physical Exam   Vital Signs: Temp: 98.2  F (36.8  C) Temp src: Oral BP: (!) 149/81 Pulse: 83   Resp: 16 SpO2: 95 % O2 Device: None (Room air)    Weight: 150 lbs 0 oz    Constitutional: awake, alert, cooperative, no apparent distress, laying in the hospital bed with the head of the bed elevated  Respiratory: no increased work of breathing, clear to auscultation bilaterally, no crackles or wheezing  Cardiovascular: regular rate and rhythm, normal S1 and S2, no murmur noted  GI: normal bowel sounds, soft, non-distended, non-tender  Skin: warm, dry  Musculoskeletal: no lower extremity pitting edema present, right knee immobilizer in place  Neurologic: awake, alert, oriented except continues to insist that her leg is not broken, moves all extremities    Medical Decision Making       40 MINUTES SPENT BY ME on the date of service doing chart review, history, exam, documentation & further activities per the note.      Data     I have personally reviewed the following data over the past 24 hrs:    5.2  \   13.5   / 198     130 (L) 100 22.6 /  94   4.3 20 (L) 1.03 (H) \

## 2025-01-16 ENCOUNTER — APPOINTMENT (OUTPATIENT)
Dept: PHYSICAL THERAPY | Facility: CLINIC | Age: 85
End: 2025-01-16
Payer: MEDICARE

## 2025-01-16 VITALS
HEIGHT: 63 IN | WEIGHT: 150 LBS | BODY MASS INDEX: 26.58 KG/M2 | OXYGEN SATURATION: 94 % | HEART RATE: 98 BPM | DIASTOLIC BLOOD PRESSURE: 75 MMHG | SYSTOLIC BLOOD PRESSURE: 129 MMHG | RESPIRATION RATE: 18 BRPM | TEMPERATURE: 98.1 F

## 2025-01-16 LAB
ANION GAP SERPL CALCULATED.3IONS-SCNC: 12 MMOL/L (ref 7–15)
BUN SERPL-MCNC: 21.2 MG/DL (ref 8–23)
CALCIUM SERPL-MCNC: 9.4 MG/DL (ref 8.8–10.4)
CHLORIDE SERPL-SCNC: 101 MMOL/L (ref 98–107)
CREAT SERPL-MCNC: 1.03 MG/DL (ref 0.51–0.95)
EGFRCR SERPLBLD CKD-EPI 2021: 53 ML/MIN/1.73M2
ERYTHROCYTE [DISTWIDTH] IN BLOOD BY AUTOMATED COUNT: 13.2 % (ref 10–15)
GLUCOSE SERPL-MCNC: 95 MG/DL (ref 70–99)
HCO3 SERPL-SCNC: 22 MMOL/L (ref 22–29)
HCT VFR BLD AUTO: 42 % (ref 35–47)
HGB BLD-MCNC: 14.2 G/DL (ref 11.7–15.7)
MCH RBC QN AUTO: 30.9 PG (ref 26.5–33)
MCHC RBC AUTO-ENTMCNC: 33.8 G/DL (ref 31.5–36.5)
MCV RBC AUTO: 92 FL (ref 78–100)
PLATELET # BLD AUTO: 234 10E3/UL (ref 150–450)
POTASSIUM SERPL-SCNC: 4.6 MMOL/L (ref 3.4–5.3)
RBC # BLD AUTO: 4.59 10E6/UL (ref 3.8–5.2)
SODIUM SERPL-SCNC: 135 MMOL/L (ref 135–145)
WBC # BLD AUTO: 5.4 10E3/UL (ref 4–11)

## 2025-01-16 PROCEDURE — 250N000013 HC RX MED GY IP 250 OP 250 PS 637: Performed by: PHYSICIAN ASSISTANT

## 2025-01-16 PROCEDURE — 80048 BASIC METABOLIC PNL TOTAL CA: CPT | Performed by: HOSPITALIST

## 2025-01-16 PROCEDURE — 120N000001 HC R&B MED SURG/OB

## 2025-01-16 PROCEDURE — 36415 COLL VENOUS BLD VENIPUNCTURE: CPT | Performed by: HOSPITALIST

## 2025-01-16 PROCEDURE — 97530 THERAPEUTIC ACTIVITIES: CPT | Mod: GP

## 2025-01-16 PROCEDURE — 250N000013 HC RX MED GY IP 250 OP 250 PS 637: Performed by: INTERNAL MEDICINE

## 2025-01-16 PROCEDURE — 85027 COMPLETE CBC AUTOMATED: CPT | Performed by: HOSPITALIST

## 2025-01-16 PROCEDURE — 99232 SBSQ HOSP IP/OBS MODERATE 35: CPT | Performed by: INTERNAL MEDICINE

## 2025-01-16 RX ORDER — LOSARTAN POTASSIUM 50 MG/1
50 TABLET ORAL DAILY
Status: DISCONTINUED | OUTPATIENT
Start: 2025-01-16 | End: 2025-02-03 | Stop reason: HOSPADM

## 2025-01-16 RX ADMIN — LOSARTAN POTASSIUM 50 MG: 50 TABLET, FILM COATED ORAL at 15:53

## 2025-01-16 RX ADMIN — QUETIAPINE FUMARATE 25 MG: 25 TABLET ORAL at 15:53

## 2025-01-16 RX ADMIN — LEVOTHYROXINE SODIUM 50 MCG: 50 TABLET ORAL at 08:47

## 2025-01-16 ASSESSMENT — ACTIVITIES OF DAILY LIVING (ADL)
ADLS_ACUITY_SCORE: 62
ADLS_ACUITY_SCORE: 60
ADLS_ACUITY_SCORE: 56
ADLS_ACUITY_SCORE: 62
ADLS_ACUITY_SCORE: 60
ADLS_ACUITY_SCORE: 56
ADLS_ACUITY_SCORE: 62
ADLS_ACUITY_SCORE: 56
ADLS_ACUITY_SCORE: 56
ADLS_ACUITY_SCORE: 60
ADLS_ACUITY_SCORE: 62
ADLS_ACUITY_SCORE: 60
ADLS_ACUITY_SCORE: 62
ADLS_ACUITY_SCORE: 62
ADLS_ACUITY_SCORE: 60
ADLS_ACUITY_SCORE: 56

## 2025-01-16 NOTE — PLAN OF CARE
"Goal Outcome Evaluation:   Date/Time 1/15 [id number]     Trauma/Ortho/Medical (Choose one) Trauma     Diagnosis:R tib/fib fx, nonoperative  Mental Status:4, forgetful at times, pt states \"I can be crabby at times with you all!\"  Activity/dangle:  turn and repo, non-wt bearing  Diet: Reg  Pain: n/a  Souza/Voiding: incontinence  Tele/Restraints/Iso: n/a  02/LDA: sl  D/C Date: pending  Other Info:Pt refuses to get OOB, turn and repo.                      "

## 2025-01-16 NOTE — PROGRESS NOTES
[facility]    Medicine Progress Note - Hospitalist Service        Date of Admission:  1/12/2025  6:57 AM    Assessment & Plan:   Arline Link is a 84 year old, female, w a PMH of cognitive decline per family, rule out dementia, HTN, vitamin D deficiency, hypothyroidism, multiple falls, who was admitted on 1/12/2025, after another fall at home and found to have a right tibial plateau fracture on CT.    Fall with Closed fracture of right tibial plateau, initial encounter  Effusion of right knee  Weakness generalized    Falls frequently  -Admitted with acute pain after a fall, history of frequent falls, such that family has a home camera.  X-ray suggested and CT confirmed tibial plateau fracture.  Orthopedics consulted.  Nonoperative.  Recommends nonweightbearing RLE, with walker use.  Unable to discharge home, safety concerns.  -Non-WB Right LE with walker.  -Keep KI in place at all times beyond hygiene and daily skin checks.  -ASA 162mg daily x 6 weeks.   -Follow-up x-rays in 2 weeks.  These can be done at facility and sent to surgeon.  -Pain control with acetaminophen, note patient frequently declines.  PRN oxycodone available, however caution with opiates due to delirium risk.  -PT/OT/Care transitions consulted.  -Plan for TCU at time of discharge.      Neuro cognitive impairment with behavioral disturbance  Acute metabolic encephalopathy on suspected worsening dementia  -Per chart review Hx progressive neurocognitive impairment.  Lives alone.  Irritable, angry demanding and sarcastic outpatient at past visits.  Due to worsening cognition, appears doubtful she can return to home safely alone.  -Daughter has been very worried about the patient's progressive fall is progressive cognitive impairment, with history favoring worsening dementia, and patient declining intervention/eval.  -Continue Seroquel 25 mg at 4 PM and at bedtime  -OT consulted for cognitive evaluation, patient refused  this.  -Concern about ability to make medical decisions.  She seems to be oriented, although continues to dispute details about her medical care (doesn't believe her leg is broken).  Medical decision making ability would need to be assessed again if patient demands to leave the hospital or refuses cares/appropriate disposition.  -CT head (screening/none recent) ordered, but patient refused and this was discontinued.  -VA report was filed on arrival  -Daughter will pursue VA/supports outpatient supports.    Possible GI bleed  Notified by nursing the patient has had 2 loose dark black bowel movements on 1/14/25.  No abdominal pain or nausea.  Vitals okay.  Repeat hemoglobin stable.  Patient denied any prior history of GI bleed, although history may be limited due to suspected dementia.  -Subsequent hemoglobin has been stable around 14.5  -Stool for occult blood negative on 1/14/2025  -Continue empiric Protonix  -No clinical evidence of ongoing GI bleeding at the moment.  -Continue prophylactic aspirin    Hyponatremia  Mild, asymptomatic.  -Resolved    History of hypothyroidism  -Continue PTA levothyroxine.     History of hypertension  -Resume prior to admission losartan     History of hypercholesterolemia  Not on a statin prior to admission.  -Follow up with primary care provider.      Diet: Regular Diet Adult  Room Service     DVT Prophylaxis: Aspirin  Souza Catheter: Not present  Code Status: Full Code     Disposition Plan      Expected Discharge Date: 01/16/2025      Destination: home        Entered: Jaswinder Bernal MD 01/16/2025, 11:43 AM        Medically Ready for Discharge: Anticipated Today pending TCU placement       Clinically Significant Risk Factors         # Hyponatremia: Lowest Na = 130 mmol/L in last 2 days, will monitor as appropriate           # Hypertension: Noted on problem list            # Overweight: Estimated body mass index is 26.57 kg/m  as calculated from the following:    Height as of this  "encounter: 1.6 m (5' 3\").    Weight as of this encounter: 68 kg (150 lb)., PRESENT ON ADMISSION     # Financial/Environmental Concerns: none            The patient's care was discussed with the Bedside Nurse and Patient.    Medical Decision Making       **CLEAR ALL SELECTIONS**      Labs/Imaging Reviewed:  See Information above and Data section below    Time SPENT BY ME on the date of service doing chart review, history, exam, documentation & further activities per the note:  35 MINUTES    Chart documentation was completed, in part, with [employer] voice-recognition software. Even though reviewed, some grammatical, spelling, and word errors may remain.    Jaswinder Bernal MD  Hospitalist Service  [facility]  Text Page 7AM-6PM  Securely message with the [employer]oc[employer] Web Console (learn more here)  Text page via [employer] Paging/Directory    ______________________________________________________________________    Interval History   Pain appears adequately controlled.  No agitation at the time my evaluation.  Patient has been intermittently refusing medicines and care.    Data reviewed today: I reviewed all medications, new labs and imaging results over the last 24 hours. I personally reviewed no images or EKG's today.    Physical Exam   Vital signs:  Temp: 97.7  F (36.5  C) Temp src: Oral BP: (!) 142/81 Pulse: 87   Resp: 18 SpO2: 96 % O2 Device: None (Room air)   Height: 160 cm (5' 3\") Weight: 68 kg (150 lb)  Estimated body mass index is 26.57 kg/m  as calculated from the following:    Height as of this encounter: 1.6 m (5' 3\").    Weight as of this encounter: 68 kg (150 lb).      Wt Readings from Last 2 Encounters:   01/12/25 68 kg (150 lb)   01/22/24 68 kg (150 lb)       Gen: AAOX2, NAD, comfortable  Resp: CTA B/L, normal WOB  CVS: RRR, no murmur  Abd/GI: Soft, non-tender. BS- normoactive.    Skin: Warm, dry no rashes  MSK: Right lower extremity on immobilizer  Neuro- CN- intact. No focal deficits.  "       Data   Recent Labs   Lab 01/16/25  0816 01/15/25  1825 01/15/25  0727 01/14/25  1442 01/14/25  0755 01/13/25  0911   WBC 5.4  --  5.2  --  6.8 6.4   HGB 14.2 14.6 13.5   < > 14.5 14.1   MCV 92  --  91  --  93 91     --  198  --  232 233     --  130*  --  138 137   POTASSIUM 4.6  --  4.3  --  4.6 4.8   CHLORIDE 101  --  100  --  104 103   CO2 22  --  20*  --  19* 22   BUN 21.2  --  22.6  --  22.7 17.5   CR 1.03*  --  1.03*  --  1.17* 1.18*   ANIONGAP 12  --  10  --  15 12   BRADY 9.4  --  9.3  --  9.8 9.7   GLC 95  --  94  --  73 96   ALBUMIN  --   --   --   --   --  3.9   PROTTOTAL  --   --   --   --   --  6.9   BILITOTAL  --   --   --   --   --  1.2   ALKPHOS  --   --   --   --   --  105   ALT  --   --   --   --   --  19   AST  --   --   --   --   --  35    < > = values in this interval not displayed.       No results found for this or any previous visit (from the past 24 hours).  Medications   Current Facility-Administered Medications   Medication Dose Route Frequency Provider Last Rate Last Admin     Current Facility-Administered Medications   Medication Dose Route Frequency Provider Last Rate Last Admin    acetaminophen (TYLENOL) tablet 650 mg  650 mg Oral Q6H Suzanne Hurd MD   325 mg at 01/15/25 0628    aspirin EC tablet 162 mg  162 mg Oral Daily Benson Coleman MD        levothyroxine (SYNTHROID/LEVOTHROID) tablet 50 mcg  50 mcg Oral Daily Suzanne Hurd MD   50 mcg at 01/16/25 0847    pantoprazole (PROTONIX) EC tablet 40 mg  [facility] Coleman MD   40 mg at 01/15/25 0749    QUEtiapine (SEROquel) tablet 25 mg  25 mg Oral Daily at 4 pm Reji Duque PA-C        QUEtiapine (SEROquel) tablet 25 mg  25 mg Oral At Bedtime Reji Duque PA-C        sodium chloride (PF) 0.9% PF flush 3 mL  3 mL Intracatheter Q8H Suzanne Hurd MD   3 mL at 01/15/25 [id number]

## 2025-01-16 NOTE — PROGRESS NOTES
Diagnosis: Right Tib/Right Fib Fx,   POD# NA  Mental Status: A&O x2-3, forgetful  Activity/dangle Not OOB during the shift. NWB, Turned and repo x2 during the shift.  Diet:Regular  Pain:Denied   Souza/Voiding: incontinent of B&B  Tele/Restraints/Iso: NA  02/LDA: on room air  D/C Date:TBD  Other Info: Refused schedule meds and cares. Large Loose stool x1   Albendazole Counseling:  I discussed with the patient the risks of albendazole including but not limited to cytopenia, kidney damage, nausea/vomiting and severe allergy.  The patient understands that this medication is being used in an off-label manner.

## 2025-01-17 LAB — POTASSIUM SERPL-SCNC: 4.3 MMOL/L (ref 3.4–5.3)

## 2025-01-17 PROCEDURE — 99232 SBSQ HOSP IP/OBS MODERATE 35: CPT | Performed by: INTERNAL MEDICINE

## 2025-01-17 PROCEDURE — 250N000013 HC RX MED GY IP 250 OP 250 PS 637: Performed by: INTERNAL MEDICINE

## 2025-01-17 PROCEDURE — 84132 ASSAY OF SERUM POTASSIUM: CPT | Performed by: INTERNAL MEDICINE

## 2025-01-17 PROCEDURE — 120N000001 HC R&B MED SURG/OB

## 2025-01-17 PROCEDURE — 36415 COLL VENOUS BLD VENIPUNCTURE: CPT | Performed by: INTERNAL MEDICINE

## 2025-01-17 RX ADMIN — ACETAMINOPHEN 650 MG: 325 TABLET, FILM COATED ORAL at 17:38

## 2025-01-17 ASSESSMENT — ACTIVITIES OF DAILY LIVING (ADL)
ADLS_ACUITY_SCORE: 62
ADLS_ACUITY_SCORE: 62
ADLS_ACUITY_SCORE: 58
ADLS_ACUITY_SCORE: 62
ADLS_ACUITY_SCORE: 62
ADLS_ACUITY_SCORE: 58
ADLS_ACUITY_SCORE: 62
ADLS_ACUITY_SCORE: 58
ADLS_ACUITY_SCORE: 58
ADLS_ACUITY_SCORE: 62
ADLS_ACUITY_SCORE: 58
ADLS_ACUITY_SCORE: 62
ADLS_ACUITY_SCORE: 58

## 2025-01-17 NOTE — PROGRESS NOTES
[facility]    Medicine Progress Note - Hospitalist Service        Date of Admission:  1/12/2025  6:57 AM    Assessment & Plan:   Arline Link is a 84 year old, female, w a PMH of cognitive decline per family, rule out dementia, HTN, vitamin D deficiency, hypothyroidism, multiple falls, who was admitted on 1/12/2025, after another fall at home and found to have a right tibial plateau fracture on CT.    Fall with Closed fracture of right tibial plateau, initial encounter  Effusion of right knee  Weakness generalized    Falls frequently  -Admitted with acute pain after a fall, history of frequent falls, such that family has a home camera.  X-ray suggested and CT confirmed tibial plateau fracture.  Orthopedics consulted.  Nonoperative.  Recommends nonweightbearing RLE, with walker use.  Unable to discharge home, safety concerns.  -Non-WB Right LE with walker.  -Keep KI in place at all times beyond hygiene and daily skin checks.  -ASA 162mg daily x 6 weeks.   -Follow-up x-rays in 2 weeks.  These can be done at facility and sent to surgeon.  -Pain control with acetaminophen, note patient frequently declines.  PRN oxycodone available, however caution with opiates due to delirium risk.  -PT/OT followed  -Plan for TCU at time of discharge.      Neuro cognitive impairment with behavioral disturbance  Acute metabolic encephalopathy on suspected worsening dementia  -Per chart review Hx progressive neurocognitive impairment.  Lives alone.  Irritable, angry demanding and sarcastic outpatient at past visits.  Due to worsening cognition, appears doubtful she can return to home safely alone.  -Daughter has been very worried about the patient's progressive fall is progressive cognitive impairment, with history favoring worsening dementia, and patient declining intervention/eval.  -Continue Seroquel 25 mg at 4 PM and at bedtime  -OT consulted for cognitive evaluation, patient refused this.  -Concern about ability  "to make medical decisions.  She seems to be oriented, although continues to dispute details about her medical care (doesn't believe her leg is broken).  Medical decision making ability would need to be assessed again if patient demands to leave the hospital or refuses cares/appropriate disposition.  -CT head (screening/none recent) ordered, but patient refused and this was discontinued.  -VA report was filed on arrival  -Daughter will pursue VA/supports outpatient supports.    Possible GI bleed  Notified by nursing the patient has had 2 loose dark black bowel movements on 1/14/25.  No abdominal pain or nausea.  Vitals okay.  Repeat hemoglobin stable.  Patient denied any prior history of GI bleed, although history may be limited due to suspected dementia.  -Subsequent hemoglobin has been stable around 14.5  -Stool for occult blood negative on 1/14/2025  -Continue empiric Protonix  -No clinical evidence of ongoing GI bleeding at the moment.  -Continue prophylactic aspirin    Hyponatremia  Mild, asymptomatic.  -Resolved    History of hypothyroidism  -Continue PTA levothyroxine.     History of hypertension  -Continue prior to admission losartan     History of hypercholesterolemia  Not on a statin prior to admission.  -Follow up with primary care provider.      Diet: Regular Diet Adult  Room Service     DVT Prophylaxis: Aspirin  Souza Catheter: Not present  Code Status: Full Code     Disposition Plan       Expected Discharge Date: 01/17/2025      Destination: home  Discharge Comments: TCU pending  adult protection...  SW/CM following      Entered: Jaswinder Bernal MD 01/17/2025, 1:04 PM        Medically Ready for Discharge: Ready Now pending placement       Clinically Significant Risk Factors                   # Hypertension: Noted on problem list            # Overweight: Estimated body mass index is 26.57 kg/m  as calculated from the following:    Height as of this encounter: 1.6 m (5' 3\").    Weight as of this " "encounter: 68 kg (150 lb).        # Financial/Environmental Concerns: none            The patient's care was discussed with the Bedside Nurse and Patient.    Medical Decision Making       **CLEAR ALL SELECTIONS**      Labs/Imaging Reviewed:  See Information above and Data section below    Time SPENT BY ME on the date of service doing chart review, history, exam, documentation & further activities per the note:  35 MINUTES    Chart documentation was completed, in part, with BRAIND[employer]IT voice-recognition software. Even though reviewed, some grammatical, spelling, and word errors may remain.    Jaswinder Bernal MD  Hospitalist Service  [facility]  Text Page 7AM-6PM  Securely message with the Vocera Web Console (learn more here)  Text page via [employer] Paging/Directory    ______________________________________________________________________    Interval History   No acute events overnight.  Patient has intermittently refused medicine and care.  Awaiting placement    Data reviewed today: I reviewed all medications, new labs and imaging results over the last 24 hours. I personally reviewed no images or EKG's today.    Physical Exam   Vital signs:  Temp: 98.8  F (37.1  C) Temp src: Axillary BP: 133/74 Pulse: 81   Resp: 16 SpO2: 94 % O2 Device: None (Room air)   Height: 160 cm (5' 3\") Weight: 68 kg (150 lb)  Estimated body mass index is 26.57 kg/m  as calculated from the following:    Height as of this encounter: 1.6 m (5' 3\").    Weight as of this encounter: 68 kg (150 lb).      Wt Readings from Last 2 Encounters:   01/12/25 68 kg (150 lb)   01/22/24 68 kg (150 lb)       Gen: AAOX2, NAD, comfortable  Resp: CTA B/L, normal WOB  CVS: RRR, no murmur  Abd/GI: Soft, non-tender. BS- normoactive.    Skin: Warm, dry no rashes  MSK: Right lower extremity on immobilizer  Neuro- CN- intact. No focal deficits.        Data   Recent Labs   Lab 01/17/25  0759 01/16/25  0816 01/15/25  1825 01/15/25  0727 01/14/25  1442 " 01/14/25  0755 01/13/25  0911   WBC  --  5.4  --  5.2  --  6.8 6.4   HGB  --  14.2 14.6 13.5   < > 14.5 14.1   MCV  --  92  --  91  --  93 91   PLT  --  234  --  198  --  232 233   NA  --  135  --  130*  --  138 137   POTASSIUM 4.3 4.6  --  4.3  --  4.6 4.8   CHLORIDE  --  101  --  100  --  104 103   CO2  --  22  --  20*  --  19* 22   BUN  --  21.2  --  22.6  --  22.7 17.5   CR  --  1.03*  --  1.03*  --  1.17* 1.18*   ANIONGAP  --  12  --  10  --  15 12   BRADY  --  9.4  --  9.3  --  9.8 9.7   GLC  --  95  --  94  --  73 96   ALBUMIN  --   --   --   --   --   --  3.9   PROTTOTAL  --   --   --   --   --   --  6.9   BILITOTAL  --   --   --   --   --   --  1.2   ALKPHOS  --   --   --   --   --   --  105   ALT  --   --   --   --   --   --  19   AST  --   --   --   --   --   --  35    < > = values in this interval not displayed.       No results found for this or any previous visit (from the past 24 hours).  Medications   Current Facility-Administered Medications   Medication Dose Route Frequency Provider Last Rate Last Admin     Current Facility-Administered Medications   Medication Dose Route Frequency Provider Last Rate Last Admin    acetaminophen (TYLENOL) tablet 650 mg  650 mg Oral Q6H Suzanne Hurd MD   325 mg at 01/15/25 0628    aspirin EC tablet 162 mg  162 mg Oral Daily Benson Coleman MD        levothyroxine (SYNTHROID/LEVOTHROID) tablet 50 mcg  50 mcg Oral Daily Suzanne Hurd MD   50 mcg at 01/16/25 0847    losartan (COZAAR) tablet 50 mg  50 mg Oral Daily Jaswinder Bernal MD   50 mg at 01/16/25 1553    pantoprazole (PROTONIX) EC tablet 40 mg  40 mg Oral QAM Benson Kingston MD   40 mg at 01/15/25 0749    QUEtiapine (SEROquel) tablet 25 mg  25 mg Oral Daily at 4 pm Reji Duque PA-C   25 mg at 01/16/25 1553    QUEtiapine (SEROquel) tablet 25 mg  25 mg Oral At Bedtime Reji Duque PA-C        sodium chloride (PF) 0.9% PF flush 3 mL  3 mL Intracatheter Q8H Suzanne Hurd  MD MICKI   3 mL at 01/17/25 5857

## 2025-01-17 NOTE — PROGRESS NOTES
Care Management Follow Up    Length of Stay (days): 5    Expected Discharge Date: 01/17/2025     Concerns to be Addressed: discharge planning     Patient plan of care discussed at interdisciplinary rounds: Yes    Anticipated Discharge Disposition: Skilled Nursing Facility              Anticipated Discharge Services: None  Anticipated Discharge DME: None    Patient/family educated on Medicare website which has current facility and service quality ratings: no  Education Provided on the Discharge Plan: Yes  Patient/Family in Agreement with the Plan: yes    Referrals Placed by CM/SW: Post Acute Facilities  Private pay costs discussed: Not applicable    Discussed  Partnership in Safe Discharge Planning  document with patient/family: No     Handoff Completed: No, handoff not indicated or clinically appropriate    Additional Information:  Message sent to [place] requesting an update on referrals as they are still pending. Additional referrals sent as every other referral had been declined. Message that Mekhi with [facility] would like a call back, [phone]. Call placed and VM left with contact information.     1545: Call to valdo Carrasco who writer was advised is the health care agent. He was pulling into the parking ramp. Writer met with him and patient at bedside. Writer updated that at this time, there are no TCU beds available but additional referrals are sent out. He confirmed that he is [facility] and writer asked him to bring copies of the documents with this next time he visits and he agreed.     Writer asked about long term plan. At this time the house that patient lives in is owned. Family is starting to look into long term options for her as she cannot go back to her home. Writer discussed options between LTC and assisted living. Provided printed material explaining and the Care Options Directory that has many levels of living in it along with costs and resources. He appreciated it. Writer told  him that when a bed is found, he will be updated and he agreed.    Next Steps: update patient/family when referral has been accepted.    ROBINA Joshi

## 2025-01-17 NOTE — PROGRESS NOTES
Date/Time 1/16/25  19:00  Diagnosis: Right tib pllateau FX - non- op  POD#: n/a   Mental Status: A&Ox2 confused  Activity/dangle: lift, NWB RLE  Diet: Regular  Pain: tylenol  Souza/Voiding: incontinent  Tele/Restraints/Iso: na  02/LDA: BELLE MIRZA  D/C Date: TBD pending placement  Other Info: Immoblizer at all times to RLE, hallucinates and agitated at times, on K+ protocol and K+  recheck ordered for AM

## 2025-01-17 NOTE — PLAN OF CARE
"Goal Outcome Evaluation:    Date/Time 1/17/25, [id number]     Trauma/Ortho/Medical (Choose one) Trauma     Diagnosis: Right tibial plateau fracture   POD#  non operative  Mental Status: A&O x4, forgetful at times  Activity/dangle: Assist of 2 with lift. Turn and repo  Diet: Reg  Pain: n/a  Souza/Voiding: incontinence, external cath in place  Tele/Restraints/Iso: n/a  02/LDA:RA/ IV-SL  D/C Date: pending  Other Info: Writer was notified by security that patient called 911 x2. Writer ask pt the reason for calling 911, pt stated \"I did not call anyone, why would I do that\".Pt refuses to get OOB,all scheduled medications, turn and repo. NWB, Knee immobilizer in place.    "

## 2025-01-17 NOTE — PLAN OF CARE
Trauma/Ortho/Medical (Choose one)  Trauma    Diagnosis: Fall, Right Tib/fib fx  Mental Status: A&O x2  Activity/dangle Assist of 2 with lift  Diet:regular  Pain:Denied   Souza/Voiding: Incontinent of B&B  Tele/Restraints/Iso: NA  02/LDA: RA/PIV SL  D/C Date:TBD  Other Info: Refused schedule meds during the shift. Loose stool x1

## 2025-01-18 LAB — POTASSIUM SERPL-SCNC: 4.3 MMOL/L (ref 3.4–5.3)

## 2025-01-18 PROCEDURE — 120N000001 HC R&B MED SURG/OB

## 2025-01-18 PROCEDURE — 84132 ASSAY OF SERUM POTASSIUM: CPT | Performed by: INTERNAL MEDICINE

## 2025-01-18 PROCEDURE — 36415 COLL VENOUS BLD VENIPUNCTURE: CPT | Performed by: INTERNAL MEDICINE

## 2025-01-18 PROCEDURE — 250N000013 HC RX MED GY IP 250 OP 250 PS 637: Performed by: INTERNAL MEDICINE

## 2025-01-18 PROCEDURE — 99232 SBSQ HOSP IP/OBS MODERATE 35: CPT | Performed by: INTERNAL MEDICINE

## 2025-01-18 RX ADMIN — ACETAMINOPHEN 325 MG: 325 TABLET, FILM COATED ORAL at 15:23

## 2025-01-18 RX ADMIN — ACETAMINOPHEN 325 MG: 325 TABLET, FILM COATED ORAL at 20:15

## 2025-01-18 RX ADMIN — ACETAMINOPHEN 325 MG: 325 TABLET, FILM COATED ORAL at 06:43

## 2025-01-18 ASSESSMENT — ACTIVITIES OF DAILY LIVING (ADL)
ADLS_ACUITY_SCORE: 62
ADLS_ACUITY_SCORE: 58
ADLS_ACUITY_SCORE: 58
ADLS_ACUITY_SCORE: 62
ADLS_ACUITY_SCORE: 58
ADLS_ACUITY_SCORE: 62
ADLS_ACUITY_SCORE: 58
ADLS_ACUITY_SCORE: 62
ADLS_ACUITY_SCORE: 58
ADLS_ACUITY_SCORE: 62
ADLS_ACUITY_SCORE: 58
ADLS_ACUITY_SCORE: 58

## 2025-01-18 NOTE — PLAN OF CARE
Goal Outcome Evaluation:    Date/Time 1/18/25, [id number]     Trauma/Ortho/Medical (Choose one) Trauma     Diagnosis: Right tibial plateau fracture   POD#  non operative  Mental Status: A&O x4, forgetful at times  Activity/dangle: Assist of 2 with lift.  Diet: Reg  Pain: denies  Souza/Voiding: incontinence, external cath in place  Tele/Restraints/Iso: n/a  02/LDA:RA/ IV-SL  D/C Date: pending  Other Info: Pt refuses to get OOB,all scheduled medications, turn and repo. NWB, Knee immobilizer in place. Family members at bedside.

## 2025-01-18 NOTE — PROGRESS NOTES
Care Management Follow Up    Length of Stay (days): 6    Expected Discharge Date: 01/18/2025     Concerns to be Addressed: discharge planning     Patient plan of care discussed at interdisciplinary rounds: No    Anticipated Discharge Disposition: Skilled Nursing Facility              Anticipated Discharge Services: Therapy  Anticipated Discharge DME: None    Patient/family educated on Medicare website which has current facility and service quality ratings: no  Education Provided on the Discharge Plan:  No  Patient/Family in Agreement with the Plan: yes    Referrals Placed by CM/SW: Post Acute Facilities  Private pay costs discussed: Not applicable    Discussed  Partnership in Safe Discharge Planning  document with patient/family: No     Handoff Completed: Yes, [facility] PCP: Internal handoff referral completed    Additional Information:  Resent referrals to [facility] to check bed availability [facility].  Also looking at bed availability at [facility].  Call placed to [place] weekend admissions to follow up on the referral.  They state that patient has not been participating in therapy therefore her insurance will not cover TCU placement.  They would like to see patient participate in therapy before the look at the referral further.  There are other pending referrals, but the facilities are not in admissions on the weekend.        taryn Provider Request Status Services Address Phone Fax Patient Preferred   [facility] ([facility]) Pending - Request Sent -- [address] [facility] [address] [phone] --   THE [facility] REFERRAL (REF'L) Pending - Request Sent -- [address] [facility] [address] [phone] --   Current Capacity last updated by Michael Bowman RN on 1/30/2024  2:23 PM    [facility] Admissions [phone]            [facility] P[facility] - Request Sent -- [address] [place] [address]  [id number] [phone] --   [facility] (TCU) Pending - Request Sent -- [address] [facility] [address] [phone] --   Current Capacity last updated by Aydee Aguilar MA on 1/6/2025 11:25 AM    11/9 Per Delmi, facility does not take [facility], 1/6 No beds until 1/10            [facility] Pending - Request Sent -- [address] [facility] [address] [phone] --   [facility] ([facility]) Pending - Request Sent -- [address] [facility] [address] [phone] --   Current Capacity last updated by Kassy Moran on 4/11/2024 12:24 PM    No on-site Hemodialysis., Unable to take MA Pending. We no longer have on site dialysis. No weekend coverage.            [facility] ([facility]) Pending - Request Sent -- [address] [facility] [address] [phone] --   Current Capacity last updated by Primo Valera MA on 1/3/2025 11:09 AM    Has Epic access - No [employer], PLEASE ONLY SEND THE FACESHEET - [facility] CAN LOOK UP THE PATIENT IN EPIC            [facility] ([facility]) Pending - Request Sent -- [address], [place] [id number] [phone] --   [facility] ([facility]) Pending - Request Sent -- [address] [place] [address] [phone] --   [facility] ([facility]) Pending - Request Sent -- [address] [place] [id number] [phone] --   [facility] ([facility]) Declined  Bed Not Available -- [address] [place] [id number] [phone] --   Current Capacity last updated by Aydee Aguilar MA on 1/6/2025 11:58 AM    No weekend admissions. No Medicaid. Admissions direct ph. [phone].,            Internal Comment last updated by Aydee Aguilar MA 1/13/2025 11:42 AM    1/13 1141  left for [facility] (SNF,[facility])  Declined  Bed Not Available -- [address] [facility] [address] [phone] --   Current Capacity last updated by Primo Valera MA on 11/26/2024  9:42 AM    11/26 per Romana they are weeks out on having open TCU beds and over a year waitlist for LTC ~ap            Internal Comment last updated by Primo Valera MA 1/13/2025  1:27 PM    1/13 1326 received call back from Omar, currently full with no beds available ~ap  1/13 1114 LVM for admissions ~ap            [facility] Declined  Acuity too high, Behavior issues or concerns, Bed Not Available -- [address] [facility] [address] [phone] --   Current Capacity last updated by Nancy Maya MA on 12/10/2024 10:07 AM    No LTC. Formerly known [facility]. Priority given to [facility] patients.            Internal Comment last updated by Aydee Aguilar MA 1/13/2025 10:32 AM    1/13 1023 A list of all pending referrals has been sent to [facility] ([facility]) Declined  Facility states they are unable to due to pt's cognition s -- [address] [facility] [address] [phone] --   Internal Comment last updated by Aydee Aguilar MA 1/15/2025 10:05 AM    1/15 0950 A complete list of all pending referrals for Deepika has been sent to [facility]  1/14 1000 A full list of all pending patients for Deepika facilities has been sent to [facility]  1/13 1023 A list of all pending referrals has been sent to [facility] Declined  Facility full -- [address] [facility] [address] [phone] --   [facility] ([facility]) Declined -- [address] [facility] [address] [phone] --   Internal Comment last updated by Aydee Aguilar MA 1/13/2025 10:32 AM    1/13 1023 A list of all  pending referrals has been sent to [facility] (SNF) Declined  Bed Not Available -- [address]  [facility] [id number] [phone] --   Internal Comment last updated by Aydee Aguilar MA 1/13/2025 10:32 AM    1/13 1023 A list of all pending referrals has been sent to [facility] - REFERRAL ONLY (SNF) Declined  Bed Not Available -- [address] [facility] [address] [phone] --   Current Capacity last updated by Michael Bowman RN on 10/31/2024  9:33 AM    [facility] [phone]            Internal Comment last updated by Kassy Moran 1/13/2025  9:20 AM    01/13/25 0914: I emailed Jessica in admissions. [facility] REFERRAL (REF'L) Declined  Cannot meet patient's psychosocial needs, Behavior issues or concerns -- -- [phone] --   Current Capacity last updated by Michael Bowman RN on 10/31/2024  3:13 PM    [facility] [place]            Internal Comment last updated by Josephine Hogan, [facility] 1/18/2025 12:39 PM    Looking for [facility] or [facility]            THE [facility] REFERRAL Declined  Bed Not Available, all memory care is full -- [address] [facility] [place] [address] [phone] --   Current Capacity last updated by Nancy Maya MA on 12/9/2024 12:09 PM    Please list Deepika facility preference            Internal Comment last updated by Aydee Aguilar MA 1/13/2025 10:32 AM    1/13 1023 A list of all pending referrals has been sent to [facility] (SNF) Declined  Bed Not Available, Facility full -- [address] [facility] [address] [phone] --   [facility] (NF) Declined  Facility declined (no reason given), Behavior issues or concerns -- [address], [facility] [address]  [phone] --   Current Capacity last updated by Kassy Moran on 11/1/2023  4:07 PM    No weekend admissions.            [facility] (TCU) Declined  Bed Not Available -- [address] [facility] [id number] [phone] --   Current Capacity last updated by Kassy Moran on 1/15/2025  2:21 PM    Does not accept [facility].            [facility] ONLY (SNF) Declined  Behavior issues or concerns, refusing cares and meds. called 911x2 today. -- [address] [facility] [id number] [phone] --   Current Capacity last updated by Michael Bowman RN on 10/31/2024  3:15 PM    [facility] [phone]                    Next Steps:   Confirm bed.  Complete PAS.  Arrange transport.        SARWAT Steve, [facility]    [phone]  [facility]          Addendum:  D:  Had a lengthy conversation with patient's grandson Danilo and granddaughter Lamar regarding discharge plans.  Explained the difficulty that we have been having in finding a TCU bed.  Explained the reason and explained that it is sounding more like LTC.  Patient's family is stating they are more interested in a assisted living facility.  Gave them the senior housing guide and strongly encourage them to look/call facilities ASAP as I explained that patient is ready to discharge.  Also explained that there ae residential care homes that may be an option too.  Gave Lamar the website to look at.  They understand that patient is ready to discharge and will update us ASAP on options.  P:  Will continue to follow.      SARWAT Steve, [facility]    [phone]  [facility]

## 2025-01-18 NOTE — PROGRESS NOTES
1/17 [id number]     Diagnosis: Right tibial plateau fracture  POD#:Non-operative  Mental Status:A&Ox2-3, forgetful at times  Activity/dangle: Assistx2 lift, turn/repo q2h, knee immobilizer in place  Diet:Regular   Pain:Scheduled tylenol, denied full dose   Souza/Voiding:Purewick in place, incontinent  Tele/Restraints/Iso:NA  02/LDA:ALEXIS MIRZA  D/C Date:Pending TCU placement   Other Info:Patient refused turn and repo, shifted body weight independently. Patient requested lowest dose for tylenol. Patient has scattered bruises. Recheck for K this AM.

## 2025-01-18 NOTE — PROGRESS NOTES
[facility]    Medicine Progress Note - Hospitalist Service        Date of Admission:  1/12/2025  6:57 AM    Assessment & Plan:   Arline Link is a 84 year old, female, w a PMH of cognitive decline per family, rule out dementia, HTN, vitamin D deficiency, hypothyroidism, multiple falls, who was admitted on 1/12/2025, after another fall at home and found to have a right tibial plateau fracture on CT.    Fall with Closed fracture of right tibial plateau, initial encounter  Effusion of right knee  Weakness generalized    Falls frequently  -Admitted with acute pain after a fall, history of frequent falls, such that family has a home camera.  X-ray suggested and CT confirmed tibial plateau fracture.  Orthopedics consulted.  Nonoperative.  Recommends nonweightbearing RLE, with walker use.  Unable to discharge home, safety concerns.  -Non-WB Right LE with walker.  -Keep KI in place at all times beyond hygiene and daily skin checks.  -ASA 162mg daily x 6 weeks.   -Follow-up x-rays in 2 weeks.  These can be done at facility and sent to surgeon.  -Pain control with acetaminophen, note patient frequently declines.  PRN oxycodone available, however caution with opiates due to delirium risk.  -Followed by PT OT, awaiting TCU     Neuro cognitive impairment with behavioral disturbance  Acute metabolic encephalopathy on suspected worsening dementia  -Per chart review Hx progressive neurocognitive impairment.  Lives alone.  Irritable, angry demanding and sarcastic outpatient at past visits.  Due to worsening cognition, appears doubtful she can return to home safely alone.  -Daughter has been very worried about the patient's progressive fall is progressive cognitive impairment, with history favoring worsening dementia, and patient declining intervention/eval.  -Continue Seroquel 25 mg at 4 PM and at bedtime  -OT consulted for cognitive evaluation, patient refused this.  -Concern about ability to make medical  "decisions.  She seems to be oriented, although continues to dispute details about her medical care (doesn't believe her leg is broken).  Medical decision making ability would need to be assessed again if patient demands to leave the hospital or refuses cares/appropriate disposition.  -CT head (screening/none recent) ordered, but patient refused and this was discontinued.  -VA report was filed on arrival  -Dima Carrasco is apparently the power of     Possible GI bleed  Notified by nursing the patient has had 2 loose dark black bowel movements on 1/14/25.  No abdominal pain or nausea.  Vitals okay.  Repeat hemoglobin stable.  Patient denied any prior history of GI bleed, although history may be limited due to suspected dementia.  -Subsequent hemoglobin has been stable around 14.5  -Stool for occult blood negative on 1/14/2025  -Continue empiric Protonix  -No clinical evidence of ongoing GI bleeding at the moment.  -Continue prophylactic aspirin    Hyponatremia  Mild, asymptomatic.  -Resolved    History of hypothyroidism  -Continue PTA levothyroxine.     History of hypertension  -Continue prior to admission losartan     History of hypercholesterolemia  Not on a statin prior to admission.  -Follow up with primary care provider.      Diet: Regular Diet Adult  Room Service     DVT Prophylaxis: Aspirin  Souza Catheter: Not present  Code Status: Full Code     Disposition Plan       Expected Discharge Date: 01/18/2025      Destination: home  Discharge Comments: TCU pending  adult protection...  SW/CM following      Entered: Jaswinder Bernal MD 01/18/2025, 12:08 PM        Medically Ready for Discharge: Ready Now pending placement       Clinically Significant Risk Factors                   # Hypertension: Noted on problem list            # Overweight: Estimated body mass index is 26.57 kg/m  as calculated from the following:    Height as of this encounter: 1.6 m (5' 3\").    Weight as of this encounter: 68 kg (150 " "lb).        # Financial/Environmental Concerns: none            The patient's care was discussed with the Bedside Nurse and Patient.    Medical Decision Making       **CLEAR ALL SELECTIONS**      Labs/Imaging Reviewed:  See Information above and Data section below    Time SPENT BY ME on the date of service doing chart review, history, exam, documentation & further activities per the note:  35 MINUTES    Chart documentation was completed, in part, with [employer] voice-recognition software. Even though reviewed, some grammatical, spelling, and word errors may remain.    Jaswinder Bernal MD  Hospitalist Service  [facility]  Text Page 7AM-6PM  Securely message with the Vocera Web Console (learn more here)  Text page via [employer] Paging/Directory    ______________________________________________________________________    Interval History   No acute events overnight.  Patient denies pain.  Awaiting placement    Data reviewed today: I reviewed all medications, new labs and imaging results over the last 24 hours. I personally reviewed no images or EKG's today.    Physical Exam   Vital signs:  Temp: 98.2  F (36.8  C) Temp src: Oral BP: 127/76 Pulse: 81   Resp: 16 SpO2: 95 % O2 Device: None (Room air)   Height: 160 cm (5' 3\") Weight: 68 kg (150 lb)  Estimated body mass index is 26.57 kg/m  as calculated from the following:    Height as of this encounter: 1.6 m (5' 3\").    Weight as of this encounter: 68 kg (150 lb).      Wt Readings from Last 2 Encounters:   01/12/25 68 kg (150 lb)   01/22/24 68 kg (150 lb)       Gen: AAOX2, NAD, comfortable  Resp: CTA B/L, normal WOB  CVS: RRR, no murmur  Abd/GI: Soft, non-tender. BS- normoactive.    Skin: Warm, dry no rashes  MSK: Right lower extremity on immobilizer  Neuro- CN- intact. No focal deficits.        Data   Recent Labs   Lab 01/18/25  0846 01/17/25  0759 01/16/25  0816 01/15/25  1825 01/15/25  0727 01/14/25  1442 01/14/25  0755 01/13/25  0911   WBC  --   -- "  5.4  --  5.2  --  6.8 6.4   HGB  --   --  14.2 14.6 13.5   < > 14.5 14.1   MCV  --   --  92  --  91  --  93 91   PLT  --   --  234  --  198  --  232 233   NA  --   --  135  --  130*  --  138 137   POTASSIUM 4.3 4.3 4.6  --  4.3  --  4.6 4.8   CHLORIDE  --   --  101  --  100  --  104 103   CO2  --   --  22  --  20*  --  19* 22   BUN  --   --  21.2  --  22.6  --  22.7 17.5   CR  --   --  1.03*  --  1.03*  --  1.17* 1.18*   ANIONGAP  --   --  12  --  10  --  15 12   BRADY  --   --  9.4  --  9.3  --  9.8 9.7   GLC  --   --  95  --  94  --  73 96   ALBUMIN  --   --   --   --   --   --   --  3.9   PROTTOTAL  --   --   --   --   --   --   --  6.9   BILITOTAL  --   --   --   --   --   --   --  1.2   ALKPHOS  --   --   --   --   --   --   --  105   ALT  --   --   --   --   --   --   --  19   AST  --   --   --   --   --   --   --  35    < > = values in this interval not displayed.       No results found for this or any previous visit (from the past 24 hours).  Medications   Current Facility-Administered Medications   Medication Dose Route Frequency Provider Last Rate Last Admin     Current Facility-Administered Medications   Medication Dose Route Frequency Provider Last Rate Last Admin    acetaminophen (TYLENOL) tablet 650 mg  650 mg Oral Q6H Suzanne Hurd MD   325 mg at 01/18/25 0643    aspirin EC tablet 162 mg  162 mg Oral Daily Benson Coleman MD        levothyroxine (SYNTHROID/LEVOTHROID) tablet 50 mcg  50 mcg Oral Daily Suzanne Hurd MD   50 mcg at 01/16/25 0847    losartan (COZAAR) tablet 50 mg  50 mg Oral Daily Jaswinder Bernal MD   50 mg at 01/16/25 1553    pantoprazole (PROTONIX) EC tablet 40 mg  40 mg Oral [facility] Benson Coleman MD   40 mg at 01/15/25 0749    QUEtiapine (SEROquel) tablet 25 mg  25 mg Oral Daily at 4 pm Reji Duque PA-C   25 mg at 01/16/25 1553    QUEtiapine (SEROquel) tablet 25 mg  25 mg Oral At Bedtime Reji Duque PA-C        sodium chloride (PF) 0.9% PF  flush 3 mL  3 mL Intracatheter Q8H Suzanne Hurd MD   3 mL at 01/17/25 7090

## 2025-01-18 NOTE — PLAN OF CARE
Goal Outcome Evaluation:    Summary: Right tibial plateau fracture   POD#  non operative    Date & Time: 1/17/25 0426-7890   Orientation:  A&O x2-3, forgetful at times  Activity Level: A2 lift,T&R q2h, NWB, Knee immobilizer in place. Refused T&R  Fall Risk: Yes   Behavior & Aggression: Green   Pain Management: Scheduled tylenol but she refused meds most of the time    ABNL VS/O2: VSS on RA ex HTN   Tele: N/A   ABNL Lab/BG: K protocols, re-check tomorrow AM   Diet: Regular   Bowel/Bladder: Incontinent B&B, pure wick in place   Skin: Scattered bruises    Drains/Devices: R PIV SL   Tests/Procedures: None   Anticipated DC Date: Pending TCU placement   Other Important Info: Pt refuses to get OOB,all scheduled medications, turn and repo

## 2025-01-19 PROCEDURE — 250N000013 HC RX MED GY IP 250 OP 250 PS 637: Performed by: INTERNAL MEDICINE

## 2025-01-19 PROCEDURE — 120N000001 HC R&B MED SURG/OB

## 2025-01-19 PROCEDURE — 99232 SBSQ HOSP IP/OBS MODERATE 35: CPT | Performed by: INTERNAL MEDICINE

## 2025-01-19 RX ADMIN — ACETAMINOPHEN 325 MG: 325 TABLET, FILM COATED ORAL at 03:00

## 2025-01-19 RX ADMIN — ACETAMINOPHEN 325 MG: 325 TABLET, FILM COATED ORAL at 16:35

## 2025-01-19 ASSESSMENT — ACTIVITIES OF DAILY LIVING (ADL)
ADLS_ACUITY_SCORE: 58

## 2025-01-19 NOTE — PLAN OF CARE
Diagnosis:Fall, right tibial fracture   Mental Status:Alert and oriented only to self   Activity/dangle: Lift, refusing OOB  Diet:Regular, poor appetite   Pain:Tylenol   Souza/Voiding:Arlene   02/LDA:S/L  D/C Date:TBD, pending TCU placement   Other: refused full skin assessment, refused change arlene, refused turns

## 2025-01-19 NOTE — PROGRESS NOTES
Care Management Follow Up    Length of Stay (days): 7    Expected Discharge Date: 01/19/2025     Concerns to be Addressed:   Health Care Directive    Patient plan of care discussed at interdisciplinary rounds: Yes    Anticipated Discharge Disposition: [facility]              Anticipated Discharge Services: None  Anticipated Discharge DME: None    Patient/family educated on Medicare website which has current facility and service quality ratings: no  Education Provided on the Discharge Plan: Yes  Patient/Family in Agreement with the Plan: yes    Referrals Placed by CM/SW:  Loli Tapia  Private pay costs discussed: Not applicable    Discussed  Partnership in Safe Discharge Planning  document with patient/family: No     Handoff Completed: Yes, [facility] PCP: Internal handoff referral completed    Additional Information:  Received a copy of patient's healthcare directive from patient's grandson.  Sent it to [employer] for them to review and to scan into patient's chart and placed the original on the front of the chart    Next Steps: Follow up with family regarding [facility] choices      SARWAT Steve, [facility]    [phone]  [facility]

## 2025-01-19 NOTE — PROGRESS NOTES
Date/Time 1/18/25  1500 - 1900  Diagnosis: R tib plateau FX non-op  POD#: n/a  Mental Status: alert to self, confused  Activity/dangle lift, NWB  Diet: Regular  Pain: tylenol  Souza/Voiding: pure wick  Tele/Restraints/Iso: na  02/LDA: RA  D/C Date: TBD  Other Info: Immobilizer at al times to RLE, on K+ protocol lab ordered for Am

## 2025-01-19 NOTE — PLAN OF CARE
Alert to self only. VSS. Pt refused all her morning medication and labs today, MD notified.  Non-WB Right LE with walker. Purewick in place with adequate output. Pt refused to turn and repositioned. Immobilizer on right leg. Discharge pending placement.

## 2025-01-19 NOTE — PROGRESS NOTES
1/18 1900-0730    Diagnosis: Right tibial plateau fracture  POD#:Non-operative  Mental Status:A&O to self, confused  Activity/dangle: Assistx2 lift, turn/repo q2h, knee immobilizer in place  Diet:Regular   Pain:Scheduled tylenol     Souza/Voiding:Purewick in place, incontinent  Tele/Restraints/Iso:NA  02/LDA:ALEXIS MIRZA  D/C Date:Pending TCU placement   Other Info:Patient shifted body weight, refused turn/repo. Patient requested lowest dose for tylenol. Patient has scattered bruises. Recheck for K this AM.

## 2025-01-19 NOTE — PROGRESS NOTES
[facility]    Medicine Progress Note - Hospitalist Service        Date of Admission:  1/12/2025  6:57 AM    Assessment & Plan:   Arline Link is a 84 year old, female, w a PMH of cognitive decline per family, rule out dementia, HTN, vitamin D deficiency, hypothyroidism, multiple falls, who was admitted on 1/12/2025, after another fall at home and found to have a right tibial plateau fracture on CT.    Fall with Closed fracture of right tibial plateau, initial encounter  Effusion of right knee  Weakness generalized    Falls frequently  -Admitted with acute pain after a fall, history of frequent falls, such that family has a home camera.  X-ray suggested and CT confirmed tibial plateau fracture.  Orthopedics consulted.  Nonoperative.  Recommends nonweightbearing RLE, with walker use.  Unable to discharge home, safety concerns.  -Non-WB Right LE with walker.  -Keep KI in place at all times beyond hygiene and daily skin checks.  -ASA 162mg daily x 6 weeks.   -Follow-up x-rays in 2 weeks.  These can be done at facility and sent to surgeon.  -Pain control with acetaminophen, note patient frequently declines.  PRN oxycodone available, however caution with opiates due to delirium risk.  -Therapies following, patient has been intermittently resistant to cares and medication.  Plan is to discharge to long-term care facility.     Neuro cognitive impairment with behavioral disturbance  Acute metabolic encephalopathy on suspected worsening dementia  -Per chart review Hx progressive neurocognitive impairment.  Lives alone.  Irritable, angry demanding and sarcastic outpatient at past visits.  Due to worsening cognition, appears doubtful she can return to home safely alone.  -Daughter has been very worried about the patient's progressive fall is progressive cognitive impairment, with history favoring worsening dementia, and patient declining intervention/eval.  -Continue Seroquel 25 mg at 4 PM and at  bedtime  -OT consulted for cognitive evaluation, patient refused this.  -Concern about ability to make medical decisions.  She seems to be oriented, although continues to dispute details about her medical care (doesn't believe her leg is broken).  Medical decision making ability would need to be assessed again if patient demands to leave the hospital or refuses cares/appropriate disposition.  -CT head (screening/none recent) ordered, but patient refused and this was discontinued.  -VA report was filed on arrival  -Dima Carrasco is the power of     Possible GI bleed  Notified by nursing the patient has had 2 loose dark black bowel movements on 1/14/25.  No abdominal pain or nausea.  Vitals okay.  Repeat hemoglobin stable.  Patient denied any prior history of GI bleed, although history may be limited due to suspected dementia.  -Subsequent hemoglobin has been stable around 14.5  -Stool for occult blood negative on 1/14/2025  -Continue empiric Protonix  -No clinical evidence of ongoing GI bleeding at the moment.  -Continue prophylactic aspirin    Hyponatremia  Mild, asymptomatic.  -Resolved    History of hypothyroidism  -Continue PTA levothyroxine.     History of hypertension  -Continue prior to admission losartan     History of hypercholesterolemia  Not on a statin prior to admission.  -Follow up with primary care provider.      Diet: Regular Diet Adult  Room Service     DVT Prophylaxis: Aspirin  Souza Catheter: Not present  Code Status: Full Code     Disposition Plan       Expected Discharge Date: 01/19/2025      Destination: home  Discharge Comments: LTC/FDC    Entered: Jaswinder Bernal MD 01/19/2025, 12:01 PM        Medically Ready for Discharge: Ready Now pending placement       Clinically Significant Risk Factors                   # Hypertension: Noted on problem list            # Overweight: Estimated body mass index is 26.57 kg/m  as calculated from the following:    Height as of this encounter: 1.6 m  "(5' 3\").    Weight as of this encounter: 68 kg (150 lb).        # Financial/Environmental Concerns: none            The patient's care was discussed with the Bedside Nurse and Patient.    Medical Decision Making       **CLEAR ALL SELECTIONS**      Labs/Imaging Reviewed:  See Information above and Data section below    Time SPENT BY ME on the date of service doing chart review, history, exam, documentation & further activities per the note:  35 MINUTES    Chart documentation was completed, in part, with [employer] voice-recognition software. Even though reviewed, some grammatical, spelling, and word errors may remain.    Jaswinder Bernal MD  Hospitalist Service  [facility]  Text Page 7AM-6PM  Securely message with the Vocera Web Console (learn more here)  Text page via [employer] Paging/Directory    ______________________________________________________________________    Interval History   No acute events overnight.  Patient has been intermittently refusing cares and medication    Data reviewed today: I reviewed all medications, new labs and imaging results over the last 24 hours. I personally reviewed no images or EKG's today.    Physical Exam   Vital signs:  Temp: 97.4  F (36.3  C) Temp src: Oral BP: 109/65 Pulse: 87   Resp: 16 SpO2: 96 % O2 Device: None (Room air)   Height: 160 cm (5' 3\") Weight: 68 kg (150 lb)  Estimated body mass index is 26.57 kg/m  as calculated from the following:    Height as of this encounter: 1.6 m (5' 3\").    Weight as of this encounter: 68 kg (150 lb).      Wt Readings from Last 2 Encounters:   01/12/25 68 kg (150 lb)   01/22/24 68 kg (150 lb)       Gen: AAOX2, NAD, comfortable  Resp: CTA B/L, normal WOB  CVS: RRR, no murmur  Abd/GI: Soft, non-tender. BS- normoactive.    Skin: Warm, dry no rashes  MSK: Right lower extremity on immobilizer  Neuro- CN- intact. No focal deficits.        Data   Recent Labs   Lab 01/18/25  0846 01/17/25  0759 01/16/25  0816 01/15/25  1825 " 01/15/25  0727 01/14/25  1442 01/14/25  0755 01/13/25  0911   WBC  --   --  5.4  --  5.2  --  6.8 6.4   HGB  --   --  14.2 14.6 13.5   < > 14.5 14.1   MCV  --   --  92  --  91  --  93 91   PLT  --   --  234  --  198  --  232 233   NA  --   --  135  --  130*  --  138 137   POTASSIUM 4.3 4.3 4.6  --  4.3  --  4.6 4.8   CHLORIDE  --   --  101  --  100  --  104 103   CO2  --   --  22  --  20*  --  19* 22   BUN  --   --  21.2  --  22.6  --  22.7 17.5   CR  --   --  1.03*  --  1.03*  --  1.17* 1.18*   ANIONGAP  --   --  12  --  10  --  15 12   BRADY  --   --  9.4  --  9.3  --  9.8 9.7   GLC  --   --  95  --  94  --  73 96   ALBUMIN  --   --   --   --   --   --   --  3.9   PROTTOTAL  --   --   --   --   --   --   --  6.9   BILITOTAL  --   --   --   --   --   --   --  1.2   ALKPHOS  --   --   --   --   --   --   --  105   ALT  --   --   --   --   --   --   --  19   AST  --   --   --   --   --   --   --  35    < > = values in this interval not displayed.       No results found for this or any previous visit (from the past 24 hours).  Medications   Current Facility-Administered Medications   Medication Dose Route Frequency Provider Last Rate Last Admin     Current Facility-Administered Medications   Medication Dose Route Frequency Provider Last Rate Last Admin    acetaminophen (TYLENOL) tablet 650 mg  650 mg Oral Q6H Suzanne Hurd MD   325 mg at 01/19/25 0300    aspirin EC tablet 162 mg  162 mg Oral Daily Benson Coleman MD        levothyroxine (SYNTHROID/LEVOTHROID) tablet 50 mcg  50 mcg Oral Daily Suzanne Hurd MD   50 mcg at 01/16/25 0847    losartan (COZAAR) tablet 50 mg  50 mg Oral Daily Jaswinder Bernal MD   50 mg at 01/16/25 1553    pantoprazole (PROTONIX) EC tablet 40 mg  40 mg Oral [facility] Benson Kingston MD   40 mg at 01/15/25 0749    QUEtiapine (SEROquel) tablet 25 mg  25 mg Oral Daily at 4 pm Reji Duque PA-C   25 mg at 01/16/25 1553    QUEtiapine (SEROquel) tablet 25 mg  25 mg  Oral At Bedtime Reji Duque PA-C        sodium chloride (PF) 0.9% PF flush 3 mL  3 mL Intracatheter Q8H Suzanne Hurd MD   3 mL at 01/19/25 4688

## 2025-01-20 ENCOUNTER — DOCUMENTATION ONLY (OUTPATIENT)
Dept: OTHER | Facility: CLINIC | Age: 85
End: 2025-01-20
Payer: MEDICARE

## 2025-01-20 PROCEDURE — 99232 SBSQ HOSP IP/OBS MODERATE 35: CPT | Performed by: INTERNAL MEDICINE

## 2025-01-20 PROCEDURE — 250N000013 HC RX MED GY IP 250 OP 250 PS 637: Performed by: HOSPITALIST

## 2025-01-20 PROCEDURE — 120N000001 HC R&B MED SURG/OB

## 2025-01-20 RX ORDER — MULTIPLE VITAMINS W/ MINERALS TAB 9MG-400MCG
1 TAB ORAL DAILY
Status: DISCONTINUED | OUTPATIENT
Start: 2025-01-20 | End: 2025-02-03 | Stop reason: HOSPADM

## 2025-01-20 RX ADMIN — ASPIRIN 81 MG: 81 TABLET, COATED ORAL at 09:21

## 2025-01-20 ASSESSMENT — ACTIVITIES OF DAILY LIVING (ADL)
ADLS_ACUITY_SCORE: 58
ADLS_ACUITY_SCORE: 58
ADLS_ACUITY_SCORE: 62
ADLS_ACUITY_SCORE: 58
ADLS_ACUITY_SCORE: 62
ADLS_ACUITY_SCORE: 58

## 2025-01-20 NOTE — PROGRESS NOTES
1/19 [id number]     Diagnosis: Right tibial plateau fracture  POD#:Non-operative  Mental Status:A&O to self   Activity/dangle: Assistx2 lift, turn/repo q2h, knee immobilizer in place  Diet:Regular, poor appetite   Pain:Scheduled tylenol, refused during shift   Souza/Voiding:Purewick in place, incontinent  Tele/Restraints/Iso:NA  02/LDA:ALEXIS MIRZA  D/C Date:Pending TCU placement   Other Info:Patient shifted body weight, refused turn/repo. Continued to have intermittent hallucinations, refused to take medication. Also refused external catheter care, and vitals

## 2025-01-20 NOTE — PLAN OF CARE
Goal Outcome Evaluation:      Plan of Care Reviewed With: patient, family    Overall Patient Progress: no changeOverall Patient Progress: no change       Alert to self only, confused. VSS, RA. CMS intact. Denies pain. Turn and repo.  Voiding in purewick.  Refuses care and meds at times.pending placement.

## 2025-01-20 NOTE — PROGRESS NOTES
[facility]    Medicine Progress Note - Hospitalist Service        Date of Admission:  1/12/2025  6:57 AM    Assessment & Plan:   Arline Link is a 84 year old, female, w a PMH of cognitive decline per family, rule out dementia, HTN, vitamin D deficiency, hypothyroidism, multiple falls, who was admitted on 1/12/2025, after another fall at home and found to have a right tibial plateau fracture on CT. Currently awaiting placement.    Fall with Closed fracture of right tibial plateau, initial encounter  Effusion of right knee  Weakness generalized    Falls frequently  -Admitted with acute pain after a fall, history of frequent falls, such that family has a home camera.  X-ray suggested and CT confirmed tibial plateau fracture.  Orthopedics consulted.  Nonoperative.  Recommends nonweightbearing RLE, with walker use.  Unable to discharge home, safety concerns.  -Non-WB Right LE with walker.  -Keep KI in place at all times beyond hygiene and daily skin checks.  -ASA 162mg daily x 6 weeks.   -Follow-up x-rays in 2 weeks.  These can be done at facility and sent to surgeon.  -Pain control with acetaminophen, note patient frequently declines.  PRN oxycodone available  -Therapies following, patient has been intermittently resistant to cares and medication.  Plan is to discharge to long-term care facility.     Neuro cognitive impairment with behavioral disturbance  Acute metabolic encephalopathy on suspected worsening dementia  -Per chart review Hx progressive neurocognitive impairment.  Lives alone.  Irritable, angry demanding and sarcastic outpatient at past visits.  Due to worsening cognition, appears doubtful she can return to home safely alone.  -Daughter has been very worried about the patient's progressive fall is progressive cognitive impairment, with history favoring worsening dementia, and patient declining intervention/eval.  -Continue Seroquel 25 mg at 4 PM and at bedtime  -OT consulted for  "cognitive evaluation, patient refused this.  -Concern about ability to make medical decisions.  She seems to be oriented, although continues to dispute details about her medical care (doesn't believe her leg is broken).  -Dima Carrasco is the power of   -CT head (screening/none recent) ordered, but patient refused and this was discontinued.  -VA report was filed on arrival    Possible GI bleed  Notified by nursing the patient has had 2 loose dark black bowel movements on 1/14/25.  No abdominal pain or nausea.  Vitals okay.  Repeat hemoglobin stable.  Patient denied any prior history of GI bleed, although history may be limited due to suspected dementia.  -Subsequent hemoglobin has been stable around 14.5  -Stool for occult blood negative on 1/14/2025  -Continue empiric Protonix  -No clinical evidence of ongoing GI bleeding at the moment.  -Phylactic aspirin resumed and tolerating well.    Hyponatremia  Mild, asymptomatic.  -Resolved    History of hypothyroidism  -Continue PTA levothyroxine.     History of hypertension  -Continue prior to admission losartan     History of hypercholesterolemia  Not on a statin prior to admission.  -Follow up with primary care provider.      Diet: Regular Diet Adult  Room Service  Snacks/Supplements Adult: Ensure Enlive; With Meals     DVT Prophylaxis: Aspirin  Souza Catheter: Not present  Code Status: Full Code     Disposition Plan       Expected Discharge Date: 01/19/2025      Destination: home  Discharge Comments: LTC/residential    Entered: Jaswinder Bernal MD 01/20/2025, 12:49 PM        Medically Ready for Discharge: Ready Now pending placement       Clinically Significant Risk Factors                   # Hypertension: Noted on problem list            # Overweight: Estimated body mass index is 26.57 kg/m  as calculated from the following:    Height as of this encounter: 1.6 m (5' 3\").    Weight as of this encounter: 68 kg (150 lb).        # Financial/Environmental Concerns: none " "           The patient's care was discussed with the Bedside Nurse and Patient.    Medical Decision Making       **CLEAR ALL SELECTIONS**      Labs/Imaging Reviewed:  See Information above and Data section below    Time SPENT BY ME on the date of service doing chart review, history, exam, documentation & further activities per the note:  35 MINUTES    Chart documentation was completed, in part, with [employer] voice-recognition software. Even though reviewed, some grammatical, spelling, and word errors may remain.    Jaswinder Bernal MD  Hospitalist Service  [facility]  Text Page 7AM-6PM  Securely message with the Vocera Web Console (learn more here)  Text page via [employer] Paging/Directory    ______________________________________________________________________    Interval History   No acute events overnight.  Patient has continued to be resistant to cares and medication intermittently.  Awaiting placement    Data reviewed today: I reviewed all medications, new labs and imaging results over the last 24 hours. I personally reviewed no images or EKG's today.    Physical Exam   Vital signs:  Temp: 98.3  F (36.8  C) Temp src: Oral BP: 121/61 Pulse: 104   Resp: 16 SpO2: 94 % O2 Device: None (Room air)   Height: 160 cm (5' 3\") Weight: 68 kg (150 lb)  Estimated body mass index is 26.57 kg/m  as calculated from the following:    Height as of this encounter: 1.6 m (5' 3\").    Weight as of this encounter: 68 kg (150 lb).      Wt Readings from Last 2 Encounters:   01/12/25 68 kg (150 lb)   01/22/24 68 kg (150 lb)       Gen: AAOX2, NAD, comfortable  Resp: CTA B/L, normal WOB  CVS: RRR, no murmur  Abd/GI: Soft, non-tender. BS- normoactive.    Skin: Warm, dry no rashes  MSK: Right lower extremity on immobilizer  Neuro- CN- intact. No focal deficits.        Data   Recent Labs   Lab 01/18/25  0846 01/17/25  0759 01/16/25  0816 01/15/25  1825 01/15/25  0727 01/14/25  1442 01/14/25  0755   WBC  --   --  5.4  --  " 5.2  --  6.8   HGB  --   --  14.2 14.6 13.5   < > 14.5   MCV  --   --  92  --  91  --  93   PLT  --   --  234  --  198  --  232   NA  --   --  135  --  130*  --  138   POTASSIUM 4.3 4.3 4.6  --  4.3  --  4.6   CHLORIDE  --   --  101  --  100  --  104   CO2  --   --  22  --  20*  --  19*   BUN  --   --  21.2  --  22.6  --  22.7   CR  --   --  1.03*  --  1.03*  --  1.17*   ANIONGAP  --   --  12  --  10  --  15   BRADY  --   --  9.4  --  9.3  --  9.8   GLC  --   --  95  --  94  --  73    < > = values in this interval not displayed.       No results found for this or any previous visit (from the past 24 hours).  Medications   Current Facility-Administered Medications   Medication Dose Route Frequency Provider Last Rate Last Admin     Current Facility-Administered Medications   Medication Dose Route Frequency Provider Last Rate Last Admin    acetaminophen (TYLENOL) tablet 650 mg  650 mg Oral Q6H Suzanne Hurd MD   325 mg at 01/19/25 1635    aspirin EC tablet 162 mg  162 mg Oral Daily Benson Coleman MD   81 mg at 01/20/25 0921    levothyroxine (SYNTHROID/LEVOTHROID) tablet 50 mcg  50 mcg Oral Daily Suzanne Hurd MD   50 mcg at 01/16/25 0847    losartan (COZAAR) tablet 50 mg  50 mg Oral Daily Jaswinder Bernal MD   50 mg at 01/16/25 1553    multivitamin w/minerals (THERA-VIT-M) tablet 1 tablet  1 tablet Oral Daily Jaswinder Bernal MD        pantoprazole (PROTONIX) EC tablet 40 mg  40 mg Oral QAM AC Benson Coleman MD   40 mg at 01/15/25 0749    QUEtiapine (SEROquel) tablet 25 mg  25 mg Oral Daily at 4 pm Reji Duque PA-C   25 mg at 01/16/25 1553    QUEtiapine (SEROquel) tablet 25 mg  25 mg Oral At Bedtime Reji Duque PA-C        sodium chloride (PF) 0.9% PF flush 3 mL  3 mL Intracatheter Q8H Suzanne Hurd MD   3 mL at 01/19/25 2123

## 2025-01-20 NOTE — PROGRESS NOTES
Care Management Follow Up    Length of Stay (days): 8    Expected Discharge Date: 01/22/2025     Concerns to be Addressed: discharge planning     Patient plan of care discussed at interdisciplinary rounds: Yes    Anticipated Discharge Disposition: Skilled Nursing Facility              Anticipated Discharge Services: None  Anticipated Discharge DME: None    Patient/family educated on Medicare website which has current facility and service quality ratings: no  Education Provided on the Discharge Plan: Yes  Patient/Family in Agreement with the Plan: yes    Referrals Placed by CM/SW: Post Acute Facilities  Private pay costs discussed: Not applicable    Discussed  Partnership in Safe Discharge Planning  document with patient/family: No     Handoff Completed: No, handoff not indicated or clinically appropriate    Additional Information:  Message from Neelam at [facility] that there may be a bed this week. Writer responded that patient is still looking for a bed and requested she review for possible placement.    Next Steps: update family after speaking to Neelam tomorrow    ROBINA Joshi

## 2025-01-20 NOTE — PROGRESS NOTES
"CLINICAL NUTRITION SERVICES - ASSESSMENT NOTE      Malnutrition Status:    % Intake: </= 50% for >/= 5 days (severe) suspected d/t limited data;  inadequate intake per flowsheets but pt reported daughter to be bringing in outside food  % Weight Loss: Unable to assess  suspect current wt is self reported  Subcutaneous Fat Loss: Triceps: Mild  Muscle Loss: Wasting of the temples (temporalis muscle): Mild, Clavicles (pectoralis and deltoids): Mild, and Interosseous muscles: Moderate  Fluid Accumulation/Edema: None noted  Malnutrition Diagnosis: Unable to determine due to lack of current wt and limited intakes data  Malnutrition Present on Admission: Unable to assess    Registered Dietitian Interventions:  Obtain a current bed or standing wt  0.5 Ensure (C) with her breakfast and lunch  multivitamin with minerals, until adequate po intake       REASON FOR ASSESSMENT  LOS    SUBJECTIVE INFORMATION  Assessed patient in room.    NUTRITION HISTORY  - Pt reported to be eating normally PTA and denied any wt loss - \"I quit all that diet crap a long time ago.\"  - Pt noted she doesn't eat very much at baseline  Home nutrition support plan: drinks oral nutrition supplements at home sometimes     CURRENT NUTRITION ORDERS  Diet: Regular    CURRENT INTAKE/TOLERANCE  - Flowsheets show 1 intakes per day of 25% vs 0% and/or refusing meals when not NPO  - Pt daughter brings her in \"whatever I want,\" and when asked why they weren't always finishing their meal trays, pt re-iterated \" I don't eat a whole lot.\"  - Eventually she was agreeable to 0.5 Ensure (C) with her breakfast and lunch     LABS  Nutrition-relevant labs:  Cr 1.03 (H)    MEDICATIONS  Nutrition-relevant medications:  Synthroid    ANTHROPOMETRICS  Height: 160 cm (5' 3\")  Most Recent Weight: 68 kg (150 lb)  IBW: 52.4 kg  % IBW: 130%  BMI (kg/m ): Overweight BMI 25-29.9  Weight History: suspect current wt is self reported  Wt Readings from Last 10 Encounters:   01/12/25 68 kg " (150 lb)   01/22/24 68 kg (150 lb)   04/15/21 68 kg (150 lb)   02/14/20 68 kg (150 lb)   08/05/19 71.3 kg (157 lb 1.6 oz)   05/20/19 73.9 kg (163 lb)   01/04/19 73.9 kg (163 lb)   01/04/19 73.9 kg (163 lb)   01/12/18 75.2 kg (165 lb 12.8 oz)   08/04/17 72.6 kg (160 lb)     Dosing Weight: 56.3 kg, based on adjusted wt    ASSESSED NUTRITION NEEDS  Estimated Energy Needs: 9846-1817 kcals/day (25 - 30 kcals/kg)  Justification: Maintenance  Estimated Protein Needs: 56-73 grams protein/day (1 - 1.2 grams of pro/kg)  Justification: Maintenance  Estimated Fluid Needs: 7269-2166 mL/day (1 mL/kcal)  Justification: Maintenance    SYSTEM FINDINGS    Skin/wounds: reviewed  GI symptoms: reviewed    MALNUTRITION  % Intake: </= 50% for >/= 5 days (severe) suspected d/t limited data;  inadequate intake per flowsheets but pt reported daughter to be bringing in outside food  % Weight Loss: Unable to assess  suspect current wt is self reported  Subcutaneous Fat Loss: Triceps: Mild  Muscle Loss: Wasting of the temples (temporalis muscle): Mild, Clavicles (pectoralis and deltoids): Mild, and Interosseous muscles: Moderate  Fluid Accumulation/Edema: None noted  Malnutrition Diagnosis: Unable to determine due to lack of current wt and limited intakes data  Malnutrition Present on Admission: Unable to assess    NUTRITION DIAGNOSIS  Predicted inadequate nutrient intake    related to poor mentation, 25% intakes documented vs incomplete intake data and need for oral nutrition supplements to help pt meet nutrition needs     INTERVENTIONS  See nutrition interventions above    Goals  PO - >50% meal trays, and/or the equivalent in nutrition supplements BID-TID     Monitoring/Evaluation  Progress toward goals will be monitored and evaluated per policy.

## 2025-01-21 PROCEDURE — 99232 SBSQ HOSP IP/OBS MODERATE 35: CPT | Performed by: INTERNAL MEDICINE

## 2025-01-21 PROCEDURE — 120N000001 HC R&B MED SURG/OB

## 2025-01-21 PROCEDURE — 250N000013 HC RX MED GY IP 250 OP 250 PS 637: Performed by: INTERNAL MEDICINE

## 2025-01-21 PROCEDURE — 250N000013 HC RX MED GY IP 250 OP 250 PS 637: Performed by: HOSPITALIST

## 2025-01-21 RX ADMIN — LEVOTHYROXINE SODIUM 50 MCG: 50 TABLET ORAL at 08:38

## 2025-01-21 RX ADMIN — ACETAMINOPHEN 650 MG: 325 TABLET, FILM COATED ORAL at 06:55

## 2025-01-21 ASSESSMENT — ACTIVITIES OF DAILY LIVING (ADL)
ADLS_ACUITY_SCORE: 62
ADLS_ACUITY_SCORE: 62
ADLS_ACUITY_SCORE: 58
ADLS_ACUITY_SCORE: 58
ADLS_ACUITY_SCORE: 62
ADLS_ACUITY_SCORE: 58
ADLS_ACUITY_SCORE: 62
ADLS_ACUITY_SCORE: 62
ADLS_ACUITY_SCORE: 58

## 2025-01-21 NOTE — PROGRESS NOTES
Care Management Follow Up    Length of Stay (days): 9    Expected Discharge Date: 01/22/2025     Concerns to be Addressed: discharge planning     Patient plan of care discussed at interdisciplinary rounds: Yes    Anticipated Discharge Disposition: Skilled Nursing Facility              Anticipated Discharge Services: None  Anticipated Discharge DME: None    Patient/family educated on Medicare website which has current facility and service quality ratings: no  Education Provided on the Discharge Plan: Yes  Patient/Family in Agreement with the Plan: yes    Referrals Placed by CM/SW: Post Acute Facilities  Private pay costs discussed: Not applicable    Discussed  Partnership in Safe Discharge Planning  document with patient/family: No     Handoff Completed: No, handoff not indicated or clinically appropriate    Additional Information:  Calls to remaining Pending referrals:   [facility]: no beds  [facility]: [facility] following but wants to see behaviors  [facility]: message left  [facility]: message left    Additional referrals sent: [facility] [place], [facility], [facility] and [facility].     Call to valdo Carrasco and  avni updating that we are still looking for placement and more referrals have been sent.    Case escalated to supervisors due to inability to find placement.    Next Steps: confirm bed    ORBINA Joshi

## 2025-01-21 NOTE — PROGRESS NOTES
[facility]    Medicine Progress Note - Hospitalist Service    Date of Admission:  1/12/2025    Assessment & Plan      Arline Link is a 84 year old, female, w a PMH of cognitive decline per family, rule out dementia, HTN, vitamin D deficiency, hypothyroidism, multiple falls, who was admitted on 1/12/2025, after another fall at home and found to have a right tibial plateau fracture on CT. Currently awaiting placement.     Fall with Closed fracture of right tibial plateau, initial encounter  Effusion of right knee  Weakness generalized    Falls frequently  -Admitted with acute pain after a fall, history of frequent falls, such that family has a home camera.  X-ray suggested and CT confirmed tibial plateau fracture.  Orthopedics consulted.  Nonoperative.  Recommends nonweightbearing RLE, with walker use.  Unable to discharge home, safety concerns.  -Non-WB Right LE with walker.  -Keep KI in place at all times beyond hygiene and daily skin checks.  -ASA 162mg daily x 6 weeks.   -Follow-up x-rays in 2 weeks.  These can be done at facility and sent to surgeon.  -Pain control with acetaminophen, note patient frequently declines.  PRN oxycodone available  -Therapies following, patient has been intermittently resistant to cares and medication.  Plan is to discharge to long-term care facility.  -consult to      Neuro cognitive impairment with behavioral disturbance  Acute metabolic encephalopathy on suspected worsening dementia  -Per chart review Hx progressive neurocognitive impairment.  Lives alone.  Irritable, angry demanding and sarcastic outpatient at past visits.  Due to worsening cognition, appears doubtful she can return to home safely alone.  -Daughter has been very worried about the patient's progressive fall is progressive cognitive impairment, with history favoring worsening dementia, and patient declining intervention/eval.  -Continue Seroquel 25 mg at 4 PM and at bedtime  -OT  "consulted for cognitive evaluation, patient refused this.  -Concern about ability to make medical decisions.  She seems to be oriented, although continues to dispute details about her medical care (doesn't believe her leg is broken).  -Dima Carrasco is the power of   -CT head (screening/none recent) ordered, but patient refused and this was discontinued.  -VA report was filed on arrival     Possible GI bleed  Notified by nursing the patient has had 2 loose dark black bowel movements on 1/14/25.  No abdominal pain or nausea.  Vitals okay.  Repeat hemoglobin stable.  Patient denied any prior history of GI bleed, although history may be limited due to suspected dementia.  -Subsequent hemoglobin has been stable around 14.5  -Stool for occult blood negative on 1/14/2025  -Continue empiric Protonix  -No clinical evidence of ongoing GI bleeding at the moment.  -Ppx aspirin resumed and tolerating well.     Hyponatremia  Mild, asymptomatic.  -Resolved     History of hypothyroidism  -Continue PTA levothyroxine.     History of hypertension  -Continue prior to admission losartan     History of hypercholesterolemia  Not on a statin prior to admission.  -Follow up with primary care provider.           Diet: Regular Diet Adult  Room Service  Snacks/Supplements Adult: Ensure Enlive; With Meals    DVT Prophylaxis: ASA  Souza Catheter: Not present  Lines: None     Cardiac Monitoring: None  Code Status: Full Code      Clinically Significant Risk Factors                   # Hypertension: Noted on problem list            # Overweight: Estimated body mass index is 26.05 kg/m  as calculated from the following:    Height as of this encounter: 1.6 m (5' 3\").    Weight as of this encounter: 66.7 kg (147 lb 0.8 oz).      # Financial/Environmental Concerns: none         Social Drivers of Health            Disposition Plan     Medically Ready for Discharge: Ready Now             Harry Burnette MD  Hospitalist Service  [facility]  Securely message with [employer] (more info)  Text page via [employer] Paging/Directory   ______________________________________________________________________    Interval History   Patient is resting in bed, watching TV, is very agitated.  Upon questioning about her symptoms and pain patient got frustrated that she does not have any pain-fracture has healed a while ago and does not understand why is everybody asking about her pain.    Physical Exam   Vital Signs: Temp: 97.8  F (36.6  C) Temp src: Oral BP: 115/63 Pulse: 69   Resp: 16 SpO2: 98 % O2 Device: None (Room air)    Weight: 147 lbs .75 oz    Constitutional: Awake, alert, cooperative, agitated.  Respiratory: Clear to auscultation bilaterally, no crackles or wheezing  Cardiovascular: Regular rate and rhythm, normal S1 and S2, and no murmur noted  GI: Normal bowel sounds, soft, non-distended, non-tender  Skin/Integumen: No rashes, no cyanosis, no edema  Other: Alert, oriented x 2 poor insight

## 2025-01-21 NOTE — PLAN OF CARE
Goal Outcome Evaluation:       Diagnosis: R tibial plateau fx  POD#: non surgical management   Orientation/Cognitive: A&OX1 confuse  Mobility Level/: A2 left, turned/repo  Pain Management: denies, refused all HS meds, refused Protonix  Tele/VS/O2: VSS@RA  Diet: Regular  Bowel/Bladder: incontinent/purewick  Drains/Devices: BELLE SL, R  D/ C date: TCU placement pending

## 2025-01-22 LAB
HOLD SPECIMEN: NORMAL
POTASSIUM SERPL-SCNC: 4.2 MMOL/L (ref 3.4–5.3)

## 2025-01-22 PROCEDURE — 36415 COLL VENOUS BLD VENIPUNCTURE: CPT | Performed by: INTERNAL MEDICINE

## 2025-01-22 PROCEDURE — 250N000013 HC RX MED GY IP 250 OP 250 PS 637: Performed by: PHYSICIAN ASSISTANT

## 2025-01-22 PROCEDURE — 99232 SBSQ HOSP IP/OBS MODERATE 35: CPT | Performed by: INTERNAL MEDICINE

## 2025-01-22 PROCEDURE — 250N000013 HC RX MED GY IP 250 OP 250 PS 637: Performed by: INTERNAL MEDICINE

## 2025-01-22 PROCEDURE — 120N000001 HC R&B MED SURG/OB

## 2025-01-22 PROCEDURE — 84132 ASSAY OF SERUM POTASSIUM: CPT | Performed by: INTERNAL MEDICINE

## 2025-01-22 PROCEDURE — 250N000009 HC RX 250: Performed by: INTERNAL MEDICINE

## 2025-01-22 RX ADMIN — LIDOCAINE: 40 CREAM TOPICAL at 05:07

## 2025-01-22 RX ADMIN — LEVOTHYROXINE SODIUM 50 MCG: 50 TABLET ORAL at 08:38

## 2025-01-22 RX ADMIN — ACETAMINOPHEN 650 MG: 325 TABLET, FILM COATED ORAL at 04:24

## 2025-01-22 RX ADMIN — Medication 1 TABLET: at 08:42

## 2025-01-22 RX ADMIN — ACETAMINOPHEN 325 MG: 325 TABLET, FILM COATED ORAL at 18:32

## 2025-01-22 RX ADMIN — LIDOCAINE: 40 CREAM TOPICAL at 08:38

## 2025-01-22 RX ADMIN — ACETAMINOPHEN 325 MG: 325 TABLET, FILM COATED ORAL at 11:08

## 2025-01-22 ASSESSMENT — ACTIVITIES OF DAILY LIVING (ADL)
ADLS_ACUITY_SCORE: 54
ADLS_ACUITY_SCORE: 54
ADLS_ACUITY_SCORE: 58
ADLS_ACUITY_SCORE: 58
ADLS_ACUITY_SCORE: 56
ADLS_ACUITY_SCORE: 58
ADLS_ACUITY_SCORE: 54
ADLS_ACUITY_SCORE: 58
ADLS_ACUITY_SCORE: 56
ADLS_ACUITY_SCORE: 58
ADLS_ACUITY_SCORE: 58
ADLS_ACUITY_SCORE: 54
ADLS_ACUITY_SCORE: 54
ADLS_ACUITY_SCORE: 56
ADLS_ACUITY_SCORE: 56
ADLS_ACUITY_SCORE: 54
ADLS_ACUITY_SCORE: 56
ADLS_ACUITY_SCORE: 54
ADLS_ACUITY_SCORE: 58
ADLS_ACUITY_SCORE: 56
ADLS_ACUITY_SCORE: 54

## 2025-01-22 NOTE — PROGRESS NOTES
Date/Time 0516-1513     Trauma/Ortho/Medical (Choose one) Trauma     Diagnosis: Right tibial plateau fracture, nonoperative  Mental Status: Alert and oriented x2; disoriented to time and situation   Activity/dangle: Ax2 with lift. Q2 turn and repo but refuses at times  Diet: Regular diet but poor appetite.  Pain: Denies pain  Souza/Voiding: Incontinent of bowel and bladder  02/LDA: IV SL  D/C Date: pending  Other Info: Patient has refused all of her meds today, stated that she's perfectly fine and doesn't need it. Refused to get out of bed and turn and repo at times. Can be verbally abusive to staff. NWB, Knee immobilizer in place.

## 2025-01-22 NOTE — PROGRESS NOTES
Care Management Follow Up    Length of Stay (days): 10    Expected Discharge Date: 01/22/2025     Concerns to be Addressed: discharge planning     Patient plan of care discussed at interdisciplinary rounds: Yes    Anticipated Discharge Disposition: Skilled Nursing Facility              Anticipated Discharge Services: None  Anticipated Discharge DME: None    Patient/family educated on Medicare website which has current facility and service quality ratings: no  Education Provided on the Discharge Plan: Yes  Patient/Family in Agreement with the Plan: yes    Referrals Placed by CM/SW: Post Acute Facilities  Private pay costs discussed: Not applicable    Discussed  Partnership in Safe Discharge Planning  document with patient/family: No     Handoff Completed: No, handoff not indicated or clinically appropriate    Additional Information:  Writer received voicemail from pt's valdo Carrasco requesting a call back. Writer called Danilo back. Danilo states that he was returning a call from unit  yesterday. Writer informed Danilo that writer is able to see that previous  had called him yesterday 1/21/2025 note states she had called [facility]. Danilo states understanding. Dnailo states that he was also calling as he spoke with an assisted living [facility] in [place]. He states he spoke with [facility] there and she informed him that there is an opening there. Danilo states that Zack asked that care management send a referral to fax number [phone]. Writer read back the fax number and Danilo states it is correct.Danilo states he has not discussed finances with [facility] yet as [facility] first wanted the referral.     Writer faxed referral to [facility] at [facility] at the fax number provided by Danilo. Writer spoke with Danilo again who states that Zack's phone number is [phone]. Writer called this number. Voicemail is generic therefore writer did not leave any pt information. Writer left  voicemail asking for a call back and only left writer's contact information.    Writer placed phone call to [facility] assisted living [phone]. Writer placed phone call and spoke with  Judit. Judit states that she will try to get ahold of [facility]. Judit then informed writer that she cannot get ahold of [facility] therefore will transfer writer to [facility]'s voicemail. Writer was transferred to voicemail for [facility] that is confidential.  Writer left voicemail informing her that referral was faxed over. Writer asking for a call back confirming that this was received.     Addendum: Writer received a call from [facility] with [facility]. Fer states that she did not receive the referral. Fer asking for referral to be sent to her at [phone]. Writer able to see that this is the number writer had already faxed referral to. Writer added [facility] to destination tab on care management and tried sending referral that way to the fax number provided by Fer. Writer called Fer back. No answer. Writer left voicemail informing Fer that writer had resent referral. Writer asking for a call back to confirm that referral had been received.     Next Steps: awaiting accepting placement.     Kenna Clark, SHMUELW  Social Work  [facility]

## 2025-01-22 NOTE — PLAN OF CARE
Goal Outcome Evaluation:      Plan of Care Reviewed With: patient    Overall Patient Progress: no changeOverall Patient Progress: no change         Date/Time:1/21/25 [id number]    Trauma/Ortho/Medical (Choose one)     Diagnosis:Fall with Closed fracture of right tibial plateau   POD#:N/A,nonsurgical  Mental Status:Alert to self  Activity/dangle:AX2 w/lift  Diet:Regular  Pain:Scheduled Tylenol and lidocaine cream  Souza/Voiding:purewick in place overnight,inconti of bowel  Tele/Restraints/Iso:None  02/LDA:PIV/SL  D/C Date:Pending placement  Other Info:Right knee immobilization at all times,refused cares at times.pt was offered oxycodone for pain but refused.

## 2025-01-22 NOTE — PROGRESS NOTES
[facility]    Medicine Progress Note - Hospitalist Service    Date of Admission:  1/12/2025    Assessment & Plan   Arline Link is a 84 year old, female, w a PMH of cognitive decline per family, rule out dementia, HTN, vitamin D deficiency, hypothyroidism, multiple falls, who was admitted on 1/12/2025, after another fall at home and found to have a right tibial plateau fracture on CT. Currently awaiting placement.     Fall with Closed fracture of right tibial plateau, initial encounter  Effusion of right knee  Weakness generalized    Falls frequently  -Admitted with acute pain after a fall, history of frequent falls, such that family has a home camera.  X-ray suggested and CT confirmed tibial plateau fracture.  Orthopedics consulted.  Nonoperative.  Recommends nonweightbearing RLE, with walker use.  Unable to discharge home, safety concerns.  -Non-WB Right LE with walker.  -Keep KI in place at all times beyond hygiene and daily skin checks.  -ASA 162mg daily x 6 weeks.   -Follow-up x-rays in 2 weeks.  These can be done at facility and sent to surgeon.  -Pain control with acetaminophen, note patient frequently declines.  PRN oxycodone available  -Therapies following, patient has been intermittently resistant to cares and medication.  Plan is to discharge to long-term care facility.  -consult to , awaiting placement.     Neuro cognitive impairment with behavioral disturbance  Acute metabolic encephalopathy on suspected worsening dementia  -Per chart review Hx progressive neurocognitive impairment.  Lives alone.  Irritable, angry demanding and sarcastic outpatient at past visits.  Due to worsening cognition, appears doubtful she can return to home safely alone.  -Daughter has been very worried about the patient's progressive fall is progressive cognitive impairment, with history favoring worsening dementia, and patient declining intervention/eval.  -Continue Seroquel 25 mg at 4 PM and at  "bedtime  -OT consulted for cognitive evaluation, patient refused this.  -Concern about ability to make medical decisions.  She seems to be oriented, although continues to dispute details about her medical care (doesn't believe her leg is broken).  -Dima Carrasco is the power of   -CT head (screening/none recent) ordered, but patient refused and this was discontinued.  -VA report was filed on arrival     Possible GI bleed  Notified by nursing the patient has had 2 loose dark black bowel movements on 1/14/25.  No abdominal pain or nausea.  Vitals okay.  Repeat hemoglobin stable.  Patient denied any prior history of GI bleed, although history may be limited due to suspected dementia.  -Subsequent hemoglobin has been stable around 14.5  -Stool for occult blood negative on 1/14/2025  -Continue empiric Protonix  -No clinical evidence of ongoing GI bleeding at the moment.  -Ppx aspirin resumed and tolerating well.     Hyponatremia  Mild, asymptomatic.  -Resolved     History of hypothyroidism  -Continue PTA levothyroxine.     History of hypertension  -Continue prior to admission losartan     History of hypercholesterolemia  Not on a statin prior to admission.  -Follow up with primary care provider.             Diet: Regular Diet Adult  Snacks/Supplements Adult: Ensure Enlive; With Meals  Room Service    DVT Prophylaxis: Pneumatic Compression Devices  Souza Catheter: Not present  Lines: None     Cardiac Monitoring: None  Code Status: Full Code      Clinically Significant Risk Factors                   # Hypertension: Noted on problem list            # Overweight: Estimated body mass index is 26.05 kg/m  as calculated from the following:    Height as of this encounter: 1.6 m (5' 3\").    Weight as of this encounter: 66.7 kg (147 lb 0.8 oz).      # Financial/Environmental Concerns: none         Social Drivers of Health            Disposition Plan     Medically Ready for Discharge: Ready Now             Harry Burnette, " MD  Hospitalist Service  [facility]  Securely message with [employer] (more info)  Text page via [employer] Paging/Directory   ______________________________________________________________________    Interval History     Patient woke up startled upon calling her name.  Patient was very irritated about being discharged.  Initially patient said she was depressed being here and that she cannot do anything.  Later about discussing discharge planning patient was frustrated that she really does not like to discharge and she is not expecting to leave this room.  Followed by which patient was very pleasant again offers no other complaints.  No family at bedside.  Patient denied any pain at the time of my visit.    Physical Exam   Vital Signs: Temp: 98.1  F (36.7  C) Temp src: Oral BP: (!) 131/73 Pulse: 71   Resp: (!) 16 SpO2: 98 % O2 Device: None (Room air)    Weight: 147 lbs .75 oz    Constitutional: Awake, alert, cooperative, frustrated/agitated.  Respiratory: Clear to auscultation bilaterally, no crackles or wheezing  Cardiovascular: Regular rate and rhythm, normal S1 and S2, and no murmur noted  GI: Normal bowel sounds, soft, non-distended, non-tender  Skin/Integumen: No rashes, no cyanosis, no edema  Other: Alert, oriented x 1

## 2025-01-22 NOTE — PLAN OF CARE
Diagnosis:Fall with Closed fracture of right tibial plateau   POD#:N/A,nonsurgical  Mental Status:Alert to self  Activity/dangle:AX2 w/lift  Diet:Regular  Pain:Scheduled Tylenol and lidocaine cream prn for R ankle  Souza/Voiding:purewick in place overnight,inconti of bowel  Tele/Restraints/Iso:None  02/LDA:PIV/SL  D/C Date:Pending placement  Other Info:Right knee immobilization at all times,refused cares at times.pt refused some morning meds.

## 2025-01-22 NOTE — PLAN OF CARE
Alert to self only.VSS. Pt refused her night time medication and refused skin assessment . Purewick with adequate urine output. Encourage PO. Right leg in immobilizer. Discharge pending.

## 2025-01-23 VITALS
DIASTOLIC BLOOD PRESSURE: 73 MMHG | RESPIRATION RATE: 15 BRPM | BODY MASS INDEX: 26.05 KG/M2 | HEIGHT: 63 IN | OXYGEN SATURATION: 93 % | SYSTOLIC BLOOD PRESSURE: 122 MMHG | HEART RATE: 87 BPM | TEMPERATURE: 97.3 F | WEIGHT: 147.05 LBS

## 2025-01-23 PROCEDURE — 120N000001 HC R&B MED SURG/OB

## 2025-01-23 PROCEDURE — 99232 SBSQ HOSP IP/OBS MODERATE 35: CPT | Performed by: INTERNAL MEDICINE

## 2025-01-23 PROCEDURE — 250N000013 HC RX MED GY IP 250 OP 250 PS 637: Performed by: INTERNAL MEDICINE

## 2025-01-23 RX ADMIN — ACETAMINOPHEN 325 MG: 325 TABLET, FILM COATED ORAL at 17:02

## 2025-01-23 RX ADMIN — ACETAMINOPHEN 650 MG: 325 TABLET, FILM COATED ORAL at 10:44

## 2025-01-23 RX ADMIN — LEVOTHYROXINE SODIUM 50 MCG: 50 TABLET ORAL at 06:39

## 2025-01-23 ASSESSMENT — ACTIVITIES OF DAILY LIVING (ADL)
ADLS_ACUITY_SCORE: 54
ADLS_ACUITY_SCORE: 49
ADLS_ACUITY_SCORE: 53
ADLS_ACUITY_SCORE: 51
ADLS_ACUITY_SCORE: 53
ADLS_ACUITY_SCORE: 53
ADLS_ACUITY_SCORE: 51
ADLS_ACUITY_SCORE: 53
ADLS_ACUITY_SCORE: 49
ADLS_ACUITY_SCORE: 53
ADLS_ACUITY_SCORE: 53
ADLS_ACUITY_SCORE: 51
ADLS_ACUITY_SCORE: 49
ADLS_ACUITY_SCORE: 49
ADLS_ACUITY_SCORE: 53
ADLS_ACUITY_SCORE: 54
ADLS_ACUITY_SCORE: 54
ADLS_ACUITY_SCORE: 56
ADLS_ACUITY_SCORE: 53
ADLS_ACUITY_SCORE: 54
ADLS_ACUITY_SCORE: 53
ADLS_ACUITY_SCORE: 53
ADLS_ACUITY_SCORE: 54

## 2025-01-23 NOTE — PLAN OF CARE
Goal Outcome Evaluation:      Plan of Care Reviewed With: patient, family    Overall Patient Progress: no changeOverall Patient Progress: no change  Date/Time 1/22 neil     Trauma/Ortho/Medical (Choose one) ortho    Diagnosis:right tib plateau fracture  POD#:non-surgical  Mental Status:confused x4- paranoia  Activity/dangle refuses OOB. Move self in bed  Diet:regular  Pain:denies  Souza/Voiding:purewick  Tele/Restraints/Iso:no  02/LDA:no IV access  D/C Date:when placement found  Other Info:has been up- not today- with 2/lift. Immobilizer on at all times. Refusing meds this evening

## 2025-01-23 NOTE — PROGRESS NOTES
[facility]    Medicine Progress Note - Hospitalist Service    Date of Admission:  1/12/2025    Assessment & Plan   Arline Link is a 84 year old, female, w a PMH of cognitive decline per family, rule out dementia, HTN, vitamin D deficiency, hypothyroidism, multiple falls, who was admitted on 1/12/2025, after another fall at home and found to have a right tibial plateau fracture on CT. Currently awaiting placement.     Fall with Closed fracture of right tibial plateau, initial encounter  Effusion of right knee  Weakness generalized    Falls frequently  -Admitted with acute pain after a fall, history of frequent falls, such that family has a home camera.  X-ray suggested and CT confirmed tibial plateau fracture.  Orthopedics consulted.  Nonoperative.  Recommends nonweightbearing RLE, with walker use.  Unable to discharge home, safety concerns.  -Non-WB Right LE with walker.  -Keep KI in place at all times beyond hygiene and daily skin checks.  -ASA 162mg daily x 6 weeks.   -Follow-up x-rays in 2 weeks.  These can be done at facility and sent to surgeon.  -Pain control with acetaminophen, note patient frequently declines.  PRN oxycodone available  -Therapies following, patient has been intermittently resistant to cares and medication.  Plan is to discharge to long-term care facility.  -consult to , awaiting placement.  -Patient continues to remain stable awaiting discharge planning.     Neuro cognitive impairment with behavioral disturbance  Acute metabolic encephalopathy on suspected worsening dementia  -Per chart review Hx progressive neurocognitive impairment.  Lives alone.  Irritable, angry demanding and sarcastic outpatient at past visits.  Due to worsening cognition, appears doubtful she can return to home safely alone.  -Daughter has been very worried about the patient's progressive fall is progressive cognitive impairment, with history favoring worsening dementia, and patient  "declining intervention/eval.  -Continue Seroquel 25 mg at 4 PM and at bedtime  -OT consulted for cognitive evaluation, patient refused this.  -Concern about ability to make medical decisions.  She seems to be oriented, although continues to dispute details about her medical care (doesn't believe her leg is broken).  -Dima Carrasco is the power of   -CT head (screening/none recent) ordered, but patient refused and this was discontinued.  -VA report was filed on arrival     Possible GI bleed  Notified by nursing the patient has had 2 loose dark black bowel movements on 1/14/25.  No abdominal pain or nausea.  Vitals okay.  Repeat hemoglobin stable.  Patient denied any prior history of GI bleed, although history may be limited due to suspected dementia.  -Subsequent hemoglobin has been stable around 14.5  -Stool for occult blood negative on 1/14/2025  -Continue empiric Protonix  -No clinical evidence of ongoing GI bleeding at the moment.  -Ppx aspirin resumed and tolerating well.     Hyponatremia  Mild, asymptomatic.  -Resolved     History of hypothyroidism  -Continue PTA levothyroxine.     History of hypertension  -Continue prior to admission losartan     History of hypercholesterolemia  Not on a statin prior to admission.  -Follow up with primary care provider.             Diet: Regular Diet Adult  Snacks/Supplements Adult: Ensure Enlive; With Meals  Room Service    DVT Prophylaxis: Pneumatic Compression Devices  Souza Catheter: Not present  Lines: None     Cardiac Monitoring: None  Code Status: Full Code      Clinically Significant Risk Factors                   # Hypertension: Noted on problem list            # Overweight: Estimated body mass index is 26.05 kg/m  as calculated from the following:    Height as of this encounter: 1.6 m (5' 3\").    Weight as of this encounter: 66.7 kg (147 lb 0.8 oz).      # Financial/Environmental Concerns: none         Social Drivers of Health            Disposition Plan "     Medically Ready for Discharge: Ready Now             Harry Burnette MD  Hospitalist Service  [facility]  Securely message with [employer] (more info)  Text page via [employer] Paging/Directory   ______________________________________________________________________    Interval History   Patient is resting in bed, offers no complaints no significant events noted overnight.  Patient continues to have uncooperative episodes overnight intermittently otherwise pleasantly confused.    Physical Exam   Vital Signs: Temp: 98.4  F (36.9  C) Temp src: Oral BP: 126/75 Pulse: 78   Resp: 16 SpO2: 98 % O2 Device: None (Room air)    Weight: 147 lbs .75 oz    Constitutional: Awake, alert, non cooperative, no apparent distress  Respiratory: Clear to auscultation bilaterally, no crackles or wheezing  Cardiovascular: Regular rate and rhythm, normal S1 and S2, and no murmur noted  GI: Normal bowel sounds, soft, non-distended, non-tender  Skin/Integumen: No rashes, no cyanosis, no edema  Other: Alert, oriented x 1

## 2025-01-23 NOTE — PROGRESS NOTES
Care Management Follow Up    Length of Stay (days): 11    Expected Discharge Date: 01/27/2025     Concerns to be Addressed: discharge planning     Patient plan of care discussed at interdisciplinary rounds: Yes    Anticipated Discharge Disposition: Skilled Nursing Facility              Anticipated Discharge Services: None  Anticipated Discharge DME: None    Patient/family educated on Medicare website which has current facility and service quality ratings: no  Education Provided on the Discharge Plan: Yes  Patient/Family in Agreement with the Plan: yes    Referrals Placed by CM/SW: Post Acute Facilities  Private pay costs discussed: Not applicable    Discussed  Partnership in Safe Discharge Planning  document with patient/family: No     Handoff Completed: No, handoff not indicated or clinically appropriate    Additional Information:  Writer received a VM from valdo Carrasco. Call returned and VM left to call back.    1530: Call to pending referrals:     [place]-several individual referrals sent plus central  [facility]: VM left  [facility]: VM left    Met with Danilo and patient at bedside. Discussed that TCU referrals are still pending. Danilo noted that he spoke to Zack today and she will see how the weekend goes. Per Danilo, if she continues to decline moving with therapy they can still accept her at [facility]. Writer noted that it will likely be that they will come to do an in person assessment.     Next Steps: follow up with family after speaking to ROBINA Valdez

## 2025-01-23 NOTE — PLAN OF CARE
Goal Outcome Evaluation:      Plan of Care Reviewed With: patient    Overall Patient Progress: no changeOverall Patient Progress: no change        Date/Time: 1/22/25 [id number]     Trauma/Ortho/Medical (Choose one) Trauma     Diagnosis:Right non-displaced lateral tibial plateau fracture, non-operative management  Mental Status:Alert to self, disoriented to time, place and situation, Intermittent confusion, forgetful  Activity/dangle: Turned and repositioned, NWB RLE  Diet: Regular  Pain: Denied pain  Souza/Voiding: Incontinent of both bowel and bladder, external catheter  Tele/Restraints/Iso:N/A  02/LDA: On RA. No IV access.  D/C Date: Pending TCU placement  Other Info: VSS. Refused most of the scheduled meds. Immobilizer on the RLE on @ all times.

## 2025-01-23 NOTE — PLAN OF CARE
Goal Outcome Evaluation:       0700-1900  Primary Diagnosis: closed fracture of right tibial plateau  Orientation: Aox4. Forgetful, pleasant  Aggression Stop Light: green  Activity: A1 NWB right foot  Diet/BS Checks: regular, room service  Tele:  NA  IV Access/Drains: no iv accesss  Pain Management: sched Tylenol given  Abnormal VS/Results: refused AM labs. VSS on RA  Bowel/Bladder: incont, no bm. PW in place  Consults: SW, ORTHO  D/C Disposition: pending custodial  Other Info: R immobilizer in place, denies nausea. Refused AM meds ex Tylenol. Denies numbness and tingling

## 2025-01-24 PROCEDURE — 99232 SBSQ HOSP IP/OBS MODERATE 35: CPT | Performed by: INTERNAL MEDICINE

## 2025-01-24 PROCEDURE — 999N000147 HC STATISTIC PT IP EVAL DEFER: Performed by: PHYSICAL THERAPIST

## 2025-01-24 PROCEDURE — 250N000013 HC RX MED GY IP 250 OP 250 PS 637: Performed by: INTERNAL MEDICINE

## 2025-01-24 PROCEDURE — 120N000001 HC R&B MED SURG/OB

## 2025-01-24 RX ADMIN — ACETAMINOPHEN 650 MG: 325 TABLET, FILM COATED ORAL at 22:44

## 2025-01-24 ASSESSMENT — ACTIVITIES OF DAILY LIVING (ADL)
ADLS_ACUITY_SCORE: 55
ADLS_ACUITY_SCORE: 58
ADLS_ACUITY_SCORE: 58
ADLS_ACUITY_SCORE: 66
ADLS_ACUITY_SCORE: 54
ADLS_ACUITY_SCORE: 55
ADLS_ACUITY_SCORE: 66
ADLS_ACUITY_SCORE: 54
ADLS_ACUITY_SCORE: 54
ADLS_ACUITY_SCORE: 66
ADLS_ACUITY_SCORE: 54
ADLS_ACUITY_SCORE: 55
ADLS_ACUITY_SCORE: 58
ADLS_ACUITY_SCORE: 54
ADLS_ACUITY_SCORE: 66
ADLS_ACUITY_SCORE: 54
ADLS_ACUITY_SCORE: 55
ADLS_ACUITY_SCORE: 58
ADLS_ACUITY_SCORE: 58
ADLS_ACUITY_SCORE: 54
ADLS_ACUITY_SCORE: 54

## 2025-01-24 NOTE — PLAN OF CARE
Goal Outcome Evaluation:         [id number]  Primary Diagnosis: closed fracture of right tibial plateau  Non surgical   Orientation: Aox4. Forgetful, pleasant  Aggression Stop Light: green  Activity: A1 NWB right foot/ mot ob yet   Diet/BS Checks: regular, room service  IV Access/Drains: no iv accesss  Pain Management: sched Tylenol refused   Abnormal VS/Results:VSS on RA  Bowel/Bladder: incont, no bm. PW in place  Consults: SW, ORTHO  D/C Disposition: pending JON[facility]  Other Info: R immobilizer in place, denies nausea. Refused oral meds, CMS intact

## 2025-01-24 NOTE — PROGRESS NOTES
[facility]    Medicine Progress Note - Hospitalist Service    Date of Admission:  1/12/2025    Assessment & Plan   Arline Link is a 84 year old, female, w a PMH of cognitive decline per family, rule out dementia, HTN, vitamin D deficiency, hypothyroidism, multiple falls, who was admitted on 1/12/2025, after another fall at home and found to have a right tibial plateau fracture on CT. Currently awaiting placement.     Fall with Closed fracture of right tibial plateau, initial encounter  Effusion of right knee  Weakness generalized    Falls frequently  -Admitted with acute pain after a fall, history of frequent falls, such that family has a home camera.  X-ray suggested and CT confirmed tibial plateau fracture.  Orthopedics consulted.  Nonoperative.  Recommends nonweightbearing RLE, with walker use.  Unable to discharge home, safety concerns.  -Non-WB Right LE with walker.  -Keep KI in place at all times beyond hygiene and daily skin checks.  -ASA 162mg daily x 6 weeks.   -Follow-up x-rays in 2 weeks.  These can be done at facility and sent to surgeon.  -Pain control with acetaminophen, note patient frequently declines.  PRN oxycodone available  -Therapies following, patient has been intermittently resistant to cares and medication.  Plan is to discharge to long-term care facility.  -consult to , awaiting placement.  -Patient continues to remain stable awaiting discharge planning.     Neuro cognitive impairment with behavioral disturbance  Acute metabolic encephalopathy on suspected worsening dementia  -Per chart review Hx progressive neurocognitive impairment.  Lives alone.  Irritable, angry demanding and sarcastic outpatient at past visits.  Due to worsening cognition, appears doubtful she can return to home safely alone.  -Daughter has been very worried about the patient's progressive fall is progressive cognitive impairment, with history favoring worsening dementia, and patient  "declining intervention/eval.  -Continue Seroquel 25 mg at 4 PM and at bedtime  -OT consulted for cognitive evaluation, patient refused this.  -Concern about ability to make medical decisions.  She seems to be oriented, although continues to dispute details about her medical care (doesn't believe her leg is broken).  -Dima Carrasco is the power of   -CT head (screening/none recent) ordered, but patient refused and this was discontinued.  -VA report was filed on arrival     Possible GI bleed  Notified by nursing the patient has had 2 loose dark black bowel movements on 1/14/25.  No abdominal pain or nausea.  Vitals okay.  Repeat hemoglobin stable.  Patient denied any prior history of GI bleed, although history may be limited due to suspected dementia.  -Subsequent hemoglobin has been stable around 14.5  -Stool for occult blood negative on 1/14/2025  -Continue empiric Protonix  -No clinical evidence of ongoing GI bleeding at the moment.  -Ppx aspirin resumed and tolerating well.     Hyponatremia  Mild, asymptomatic.  -Resolved     History of hypothyroidism  -Continue PTA levothyroxine.     History of hypertension  -Continue prior to admission losartan     History of hypercholesterolemia  Not on a statin prior to admission.  -Follow up with primary care provider.             Diet: Regular Diet Adult  Snacks/Supplements Adult: Ensure Enlive; With Meals  Room Service    DVT Prophylaxis: Pneumatic Compression Devices  Souza Catheter: Not present  Lines: None     Cardiac Monitoring: None  Code Status: Full Code      Clinically Significant Risk Factors                   # Hypertension: Noted on problem list            # Overweight: Estimated body mass index is 26.05 kg/m  as calculated from the following:    Height as of this encounter: 1.6 m (5' 3\").    Weight as of this encounter: 66.7 kg (147 lb 0.8 oz).   # Moderate Malnutrition: based on nutrition assessment    # Financial/Environmental Concerns: none     "     Social Drivers of Health            Disposition Plan     Medically Ready for Discharge: Ready Now             Harry Burnette MD  Hospitalist Service  [facility]  Securely message with Preet[employer] (more info)  Text page via [employer] Paging/Directory   ______________________________________________________________________    Interval History   Patient is resting in bed, woke up to her name, was not interested in having a conversation today.  Stated she does not have any concerns and would like to go back to bed.  No overnight events noted.    Physical Exam   Vital Signs: Temp: 98  F (36.7  C) Temp src: Oral BP: 120/71 Pulse: 85   Resp: 17 SpO2: 96 % O2 Device: None (Room air)    Weight: 147 lbs .75 oz    Constitutional: Sleeping no on cooperative, no apparent distress  Respiratory: Clear to auscultation bilaterally, no crackles or wheezing  Cardiovascular: Regular rate and rhythm, normal S1 and S2, and no murmur noted  GI: Normal bowel sounds, soft, non-distended, non-tender  Skin/Integumen: No rashes, no cyanosis, no edema  Other: Oriented to self

## 2025-01-24 NOTE — PLAN OF CARE
Goal Outcome Evaluation:    Overall Patient Progress: no change    Trauma/Ortho/Medical (Choose one) ortho     Diagnosis:right tib plateau fracture  POD#:non-surgical  Mental Status:Alert to self  Activity/dangle refuses to get OOB. Refused repositioning  Diet:regular  Pain:denies  Souza/Voiding:purewick  Tele/Restraints/Iso: NA  02/LDA:none  D/C Date:Pending placement  Other Info:immobilizer on at all times. Refused all schedule medications

## 2025-01-24 NOTE — PROGRESS NOTES
Care Management Follow Up    Length of Stay (days): 12    Expected Discharge Date: 01/27/2025     Concerns to be Addressed: discharge planning     Patient plan of care discussed at interdisciplinary rounds: Yes    Anticipated Discharge Disposition: Skilled Nursing Facility              Anticipated Discharge Services: None  Anticipated Discharge DME: None    Patient/family educated on Medicare website which has current facility and service quality ratings: no  Education Provided on the Discharge Plan: Yes  Patient/Family in Agreement with the Plan: yes    Referrals Placed by CM/SW: Post Acute Facilities  Private pay costs discussed: Not applicable    Discussed  Partnership in Safe Discharge Planning  document with patient/family: No     Handoff Completed: No, handoff not indicated or clinically appropriate    Additional Information:  Message received from Zack with [facility]. She confirmed that if patient does NOT go to TCU, they can accept her into a higher level of care. They would like notes faxed on Monday from the weekend at [phone]. Zack can be reached at [phone].     Message to therapist assigned today indicating that grandson will be here today earlier in the day and he would like therapy to attempt to work with her when he's here. Writer also updated bedside nurse of this request.     Next Steps: follow up with family     ROBINA Joshi

## 2025-01-24 NOTE — CARE PLAN
PT discussion with pt's grandson, attempted again to have pt participate with grandson's encouragement. Pt cont to refuse any mob, denies she has anything wrong with her LE, cont with confusion, agitation and aggression.  Educated grandson that for a pt to be accepted and cont in rehab pt has to participate and show potential to improve.  Pt does neither.  Current rec would be for long term care/memory care. Previous facility will take her back at a higher level of care. Will complete orders.    Physical Therapy Discharge Summary    Reason for therapy discharge:    No further expectations of functional progress.    Progress towards therapy goal(s). See goals on Care Plan in [facility] electronic health record for goal details.  Goals not met.  Barriers to achieving goals:   Pt refusinig, non cooperative, cog deficits.

## 2025-01-24 NOTE — PROGRESS NOTES
"CLINICAL NUTRITION SERVICES - REASSESSMENT NOTE     RECOMMENDATIONS FOR MDs/PROVIDERS TO ORDER:  Last BM noted on 1/18 - consider starting a bowel regimen  Patient continues with poor oral intake x12 days. Consider starting an appetite stimulant vs nutrition support if appropriate or within GOC.     Malnutrition Status:    Malnutrition Diagnosis: Moderate Malnutrition in the context of chronic illness or injury   Malnutrition Present on Admission: Yes    Registered Dietitian Interventions:  Continue oral nutrition supplements as ordered   Continue MVI/M  Oral intake encouragement appreciated       SUBJECTIVE INFORMATION  Assessed patient in room.    CURRENT NUTRITION ORDERS  Diet: Regular, Ensure (0.5 portion) with breakfast and dinner, Room Service w/ assist     CURRENT INTAKE/TOLERANCE  Per flowsheet documentation: Varying intakes documented, mostly 25-50% intakes.   Per review of [facility]: Patient has been receiving 3 nutritionally adequate meal trays daily, with 0.5 portion ensure with breakfast and dinner.     Patient noted to be disoriented to place and time. At visit today patient was tearful and distressed, she wanted to know where her grandson is.  When asked about intake, she had complaints about the food and was intermittently confused about what was on the menu and what she was able to order.  When asked about the ensure she said she doesn't know what that is.  RD encouraged patient to drink the ensure supplement to get good nutrition, she said \"I don't need to be worried about that\". Encouraged oral intake to help her stay strong and meet her nutrition needs. Patient then was insistent about getting a muffin. RD ordered a blueberry muffin and decaf coffee per pt request.     Poor dentition noted. Unable to complete nutrition history at this time d/t AMS/cognitive decline.      NEW FINDINGS  Weight: stable per charted weights, could be skewed with edema   Vitals:    01/12/25 0702 01/20/25 1521 "   Weight: 68 kg (150 lb) 66.7 kg (147 lb 0.8 oz)     Skin/wounds: 2+ edema in BLE    I/Os: last BM 1/18    Nutrition-relevant labs: Reviewed    Nutrition-relevant medications: Reviewed, MVI/M       MALNUTRITION  % Intake: </= 50% for >/= 5 days (severe) - during admit  % Weight Loss: Unable to assess d/t limited wt records available   Subcutaneous Fat Loss: Triceps: Mild, Orbital region: Mild  Muscle Loss: Wasting of the temples (temporalis muscle): Mild, Clavicles (pectoralis and deltoids): Mild, and Interosseous muscles: Moderate  Fluid Accumulation/Edema: None noted  Malnutrition Diagnosis: Moderate Malnutrition in the context of chronic illness or injury   Malnutrition Present on Admission: Yes    EVALUATION OF THE PROGRESS TOWARD GOALS   Previous Goals  PO - >50% meal trays, and/or the equivalent in nutrition supplements BID-TID  Evaluation: Not progressing    Previous Nutrition Diagnosis  Predicted inadequate nutrient intake    related to poor mentation, 25% intakes documented vs incomplete intake data and need for oral nutrition supplements to help pt meet nutrition needs   Evaluation: No change    NUTRITION DIAGNOSIS  Inadequate oral intake related to poor mentation, dislike of food as evidenced by documented oral intake during admission, evidence of muscle and fat wasting on exam, and needing oral intake encouragement and oral nutrition supplements to meet estimated needs.    INTERVENTIONS  Medical food supplement therapy  Nutrition counseling strategies  Vitamin and mineral supplement therapy    Goals  Patient to consume >/=50-75% of nutritionally adequate meal trays TID, or the equivalent with supplements/snacks.     Monitoring/Evaluation      Progress toward goals will be monitored and evaluated per policy.      Mary Lehman, MS, RD, LD  Clinical Dietitian II  Pager: [phone]

## 2025-01-24 NOTE — PLAN OF CARE
Goal Outcome Evaluation:      Plan of Care Reviewed With: patient, family    Overall Patient Progress: no changeOverall Patient Progress: no change  Date/Time 1/23 neil    Trauma/Ortho/Medical (Choose one) ortho    Diagnosis:right tib plateau fracture  POD#:non-surgical  Mental Status:confused but more pleasant than yesterday  Activity/dangle refuses to get OOB. Moves with 1-2 assist in bed  Diet:regular  Pain:denies  Souza/Voiding:purewick  Tele/Restraints/Iso:no  02/LDA:none  D/C Date:when placement found  Other Info:immobilizer on at all times. Will take only 1 tylenol.

## 2025-01-25 PROCEDURE — 250N000013 HC RX MED GY IP 250 OP 250 PS 637: Performed by: HOSPITALIST

## 2025-01-25 PROCEDURE — 99231 SBSQ HOSP IP/OBS SF/LOW 25: CPT | Performed by: INTERNAL MEDICINE

## 2025-01-25 PROCEDURE — 250N000013 HC RX MED GY IP 250 OP 250 PS 637: Performed by: INTERNAL MEDICINE

## 2025-01-25 PROCEDURE — 120N000001 HC R&B MED SURG/OB

## 2025-01-25 RX ADMIN — ACETAMINOPHEN 650 MG: 325 TABLET, FILM COATED ORAL at 04:48

## 2025-01-25 RX ADMIN — LEVOTHYROXINE SODIUM 50 MCG: 50 TABLET ORAL at 06:46

## 2025-01-25 RX ADMIN — PANTOPRAZOLE SODIUM 40 MG: 40 TABLET, DELAYED RELEASE ORAL at 06:46

## 2025-01-25 ASSESSMENT — ACTIVITIES OF DAILY LIVING (ADL)
ADLS_ACUITY_SCORE: 66
ADLS_ACUITY_SCORE: 66
ADLS_ACUITY_SCORE: 70
ADLS_ACUITY_SCORE: 70
ADLS_ACUITY_SCORE: 66
ADLS_ACUITY_SCORE: 70
ADLS_ACUITY_SCORE: 66
ADLS_ACUITY_SCORE: 70
ADLS_ACUITY_SCORE: 66

## 2025-01-25 NOTE — PLAN OF CARE
Goal Outcome Evaluation:       DATE & TIME: 1/24/25-1/25/25 [id number]    Cognitive Concerns/ Orientation : Alert oriented to self   BEHAVIOR & AGGRESSION TOOL COLOR: Green  CIWA SCORE: NA   ABNL VS/O2: VSS on RA  MOBILITY: assist of 2 with a lift, able to turn self in bed  PAIN MANAGMENT: denies pain  DIET: Reg  BOWEL/BLADDER: Incontinent, purewick in place  ABNL LAB/BG: Cr 1.03  DRAIN/DEVICES: Purewick, No IV access  TELEMETRY RHYTHM: NA  SKIN: scattered bruises, blanchable redness on buttocks  TESTS/PROCEDURES: None  D/C DATE: awaiting placement, SW following  OTHER IMPORTANT INFO: immobilizer on at all times, NWB on R LE. CMS intact on distal extremity

## 2025-01-25 NOTE — PLAN OF CARE
Goal Outcome Evaluation:         Diagnosis: Right tibial plateau fracture.  POD#: non-surgical  Mental Status:Alert to self  Activity/dangle Not OOB refuses to get OOB.   Diet:regular  Pain: Managed with schedule Tylenol.  Souza/Voiding: Pureweck  Tele/Restraints/Iso: NA  02/LDA:  None  D/C Date: Pending   Other  Pt refused the medication, right leg immobilizer on at all times.

## 2025-01-25 NOTE — PLAN OF CARE
Goal Outcome Evaluation:    Overall Patient Progress: no change    Trauma/Ortho/Medical (Choose one) ortho     Diagnosis:right tibial plateau fracture  POD#:non-surgical  Mental Status:Alert to self  Activity/dangle Assist of 2 with lift  Diet:regular, poor appetite   Pain:denies  Souza/Voiding: incontinent of B&B, purewick in place  Tele/Restraints/Iso: NA  02/LDA:none  D/C Date:Pending placement  Other Info:immobilizer on at all times. Refused all schedule medications. Up in chair this morning.

## 2025-01-26 PROCEDURE — 99231 SBSQ HOSP IP/OBS SF/LOW 25: CPT | Performed by: INTERNAL MEDICINE

## 2025-01-26 PROCEDURE — 120N000001 HC R&B MED SURG/OB

## 2025-01-26 PROCEDURE — 250N000013 HC RX MED GY IP 250 OP 250 PS 637: Performed by: INTERNAL MEDICINE

## 2025-01-26 RX ADMIN — LEVOTHYROXINE SODIUM 50 MCG: 50 TABLET ORAL at 06:41

## 2025-01-26 RX ADMIN — ACETAMINOPHEN 650 MG: 325 TABLET, FILM COATED ORAL at 02:24

## 2025-01-26 ASSESSMENT — ACTIVITIES OF DAILY LIVING (ADL)
ADLS_ACUITY_SCORE: 66
ADLS_ACUITY_SCORE: 70
ADLS_ACUITY_SCORE: 66
ADLS_ACUITY_SCORE: 66
ADLS_ACUITY_SCORE: 70
ADLS_ACUITY_SCORE: 66
ADLS_ACUITY_SCORE: 70
ADLS_ACUITY_SCORE: 66
ADLS_ACUITY_SCORE: 66
ADLS_ACUITY_SCORE: 70
ADLS_ACUITY_SCORE: 66
ADLS_ACUITY_SCORE: 70
ADLS_ACUITY_SCORE: 70
ADLS_ACUITY_SCORE: 66
ADLS_ACUITY_SCORE: 66
ADLS_ACUITY_SCORE: 70
ADLS_ACUITY_SCORE: 70
ADLS_ACUITY_SCORE: 66

## 2025-01-26 NOTE — PLAN OF CARE
Alert to self only,refused to take medications. Only took tylenol for pain. Purewick in place with adequate output. Up with 2 assist/lift. Regular diet. Immobilizer on right leg. TCU pending.

## 2025-01-26 NOTE — PROGRESS NOTES
[facility]    Medicine Progress Note - Hospitalist Service    Date of Admission:  1/12/2025    Assessment & Plan   Arline Link is a 84 year old, female, w a PMH of cognitive decline per family, rule out dementia, HTN, vitamin D deficiency, hypothyroidism, multiple falls, who was admitted on 1/12/2025, after another fall at home and found to have a right tibial plateau fracture on CT. Currently awaiting placement.     Fall with Closed fracture of right tibial plateau, initial encounter  Effusion of right knee  Weakness generalized    Falls frequently  -Admitted with acute pain after a fall, history of frequent falls, such that family has a home camera.  X-ray suggested and CT confirmed tibial plateau fracture.  Orthopedics consulted.  Nonoperative.  Recommends nonweightbearing RLE, with walker use.  Unable to discharge home, safety concerns.  -Non-WB Right LE with walker.  -Keep KI in place at all times beyond hygiene and daily skin checks.  -ASA 162mg daily x 6 weeks.   -Follow-up x-rays in 2 weeks.  These can be done at facility and sent to surgeon.  -Pain control with acetaminophen, note patient frequently declines.  PRN oxycodone available  -Therapies following, patient has been intermittently resistant to cares and medication.  Plan is to discharge to long-term care facility.  -consult to , awaiting placement.  -Patient continues to remain stable awaiting discharge planning.     Neuro cognitive impairment with behavioral disturbance  Acute metabolic encephalopathy on suspected worsening dementia  -Per chart review Hx progressive neurocognitive impairment.  Lives alone.  Irritable, angry demanding and sarcastic outpatient at past visits.  Due to worsening cognition, appears doubtful she can return to home safely alone.  -Daughter has been very worried about the patient's progressive fall is progressive cognitive impairment, with history favoring worsening dementia, and patient  "declining intervention/eval.  -Continue Seroquel 25 mg at 4 PM and at bedtime  -OT consulted for cognitive evaluation, patient refused this.  -Concern about ability to make medical decisions.  She seems to be oriented, although continues to dispute details about her medical care (doesn't believe her leg is broken).  -Dima Carrasco is the power of   -CT head (screening/none recent) ordered, but patient refused and this was discontinued.  -[facility] report was filed on arrival     Possible GI bleed  Notified by nursing the patient has had 2 loose dark black bowel movements on 1/14/25.  No abdominal pain or nausea.  Vitals okay.  Repeat hemoglobin stable.  Patient denied any prior history of GI bleed, although history may be limited due to suspected dementia.  -Subsequent hemoglobin has been stable around 14.5  -Stool for occult blood negative on 1/14/2025  -Continue empiric Protonix  -No clinical evidence of ongoing GI bleeding at the moment.  -Ppx aspirin resumed and tolerating well.     Hyponatremia  Mild, asymptomatic.  -Resolved     History of hypothyroidism  -Continue PTA levothyroxine.     History of hypertension  -Continue prior to admission losartan     History of hypercholesterolemia  Not on a statin prior to admission.  -Follow up with primary care provider.             Diet: Regular Diet Adult  Snacks/Supplements Adult: Ensure Enlive; With Meals  Room Service    DVT Prophylaxis: Pneumatic Compression Devices  Souza Catheter: Not present  Lines: None     Cardiac Monitoring: None  Code Status: Full Code      Clinically Significant Risk Factors                   # Hypertension: Noted on problem list            # Overweight: Estimated body mass index is 26.05 kg/m  as calculated from the following:    Height as of this encounter: 1.6 m (5' 3\").    Weight as of this encounter: 66.7 kg (147 lb 0.8 oz).   # Moderate Malnutrition: based on nutrition assessment    # Financial/Environmental Concerns: none     "     Social Drivers of Health            Disposition Plan     Medically Ready for Discharge: Ready Now             Harry Burnette MD  Hospitalist Service  [facility]  Securely message with [employer] (more info)  Text page via [employer] Paging/Directory   ______________________________________________________________________    Interval History   Patient is sitting up in her chair, at the time of my visit, patient did not want to have a conversation.  Offers no complaints to me.  No significant changes/overnight events noted.    Physical Exam   Vital Signs: Temp: 97.9  F (36.6  C) Temp src: Oral BP: 105/68 Pulse: 86   Resp: 16 SpO2: 94 % O2 Device: None (Room air)    Weight: 147 lbs .75 oz    Constitutional: Awake, alert, noncooperative.  Respiratory: Clear to auscultation bilaterally, no crackles or wheezing  Cardiovascular: Regular rate and rhythm, normal S1 and S2, and no murmur noted  GI: Normal bowel sounds, soft, non-distended, non-tender

## 2025-01-26 NOTE — PLAN OF CARE
Goal Outcome Evaluation:    Overall Patient Progress: no change    Trauma/Ortho/Medical (Choose one) ortho     Diagnosis:Right tibial plateau fracture  POD#:non-surgical  Mental Status:A&O x2  Activity/dangle Assist of 2 with lift  Diet:regular, poor appetite   Pain:denies  Souza/Voiding: incontinent of B&B, purewick in place  Tele/Restraints/Iso: NA  02/LDA:none  D/C Date:Pending placement  Other Info: RLE immobilizer on at all times. Up in chair this shift. Refused turn/repo and all medications.

## 2025-01-26 NOTE — PROGRESS NOTES
[facility]    Medicine Progress Note - Hospitalist Service    Date of Admission:  1/12/2025    Assessment & Plan   Arline Link is a 84 year old, female, w a PMH of cognitive decline per family, rule out dementia, HTN, vitamin D deficiency, hypothyroidism, multiple falls, who was admitted on 1/12/2025, after another fall at home and found to have a right tibial plateau fracture on CT. Currently awaiting placement.     Fall with Closed fracture of right tibial plateau, initial encounter  Effusion of right knee  Weakness generalized    Falls frequently  -Admitted with acute pain after a fall, history of frequent falls, such that family has a home camera.  X-ray suggested and CT confirmed tibial plateau fracture.  Orthopedics consulted.  Nonoperative.  Recommends nonweightbearing RLE, with walker use.  Unable to discharge home, safety concerns.  -Non-WB Right LE with walker.  -Keep KI in place at all times beyond hygiene and daily skin checks.  -ASA 162mg daily x 6 weeks.   -Follow-up x-rays in 2 weeks.  These can be done at facility and sent to surgeon.  -Pain control with acetaminophen, note patient frequently declines.  PRN oxycodone available  -Therapies following, patient has been intermittently resistant to cares and medication.  Plan is to discharge to long-term care facility.  -consult to , awaiting placement.  -Patient continues to remain stable awaiting discharge planning.     Neuro cognitive impairment with behavioral disturbance  Acute metabolic encephalopathy on suspected worsening dementia  -Per chart review Hx progressive neurocognitive impairment.  Lives alone.  Irritable, angry demanding and sarcastic outpatient at past visits.  Due to worsening cognition, appears doubtful she can return to home safely alone.  -Daughter has been very worried about the patient's progressive fall is progressive cognitive impairment, with history favoring worsening dementia, and patient  "declining intervention/eval.  -Continue Seroquel 25 mg at 4 PM and at bedtime  -OT consulted for cognitive evaluation, patient refused this.  -Concern about ability to make medical decisions.  She seems to be oriented, although continues to dispute details about her medical care (doesn't believe her leg is broken).  -Dima Carrasco is the power of   -CT head (screening/none recent) ordered, but patient refused and this was discontinued.  -VA report was filed on arrival     Possible GI bleed  Notified by nursing the patient has had 2 loose dark black bowel movements on 1/14/25.  No abdominal pain or nausea.  Vitals okay.  Repeat hemoglobin stable.  Patient denied any prior history of GI bleed, although history may be limited due to suspected dementia.  -Subsequent hemoglobin has been stable around 14.5  -Stool for occult blood negative on 1/14/2025  -Continue empiric Protonix  -No clinical evidence of ongoing GI bleeding at the moment.  -Ppx aspirin resumed and tolerating well.     Hyponatremia  Mild, asymptomatic.  -Resolved     History of hypothyroidism  -Continue PTA levothyroxine.     History of hypertension  -Continue prior to admission losartan     History of hypercholesterolemia  Not on a statin prior to admission.  -Follow up with primary care provider.     Patient has been stable for discharge, awaiting placement.   is involved.  Patient has a POA.  No new medical concerns over the last few days.        Diet: Regular Diet Adult  Snacks/Supplements Adult: Ensure Enlive; With Meals  Room Service    DVT Prophylaxis: Pneumatic Compression Devices  Souza Catheter: Not present  Lines: None     Cardiac Monitoring: None  Code Status: Full Code      Clinically Significant Risk Factors                   # Hypertension: Noted on problem list            # Overweight: Estimated body mass index is 26.05 kg/m  as calculated from the following:    Height as of this encounter: 1.6 m (5' 3\").    Weight as " of this encounter: 66.7 kg (147 lb 0.8 oz).   # Moderate Malnutrition: based on nutrition assessment    # Financial/Environmental Concerns: none         Social Drivers of Health            Disposition Plan     Medically Ready for Discharge: Ready Now             Harry Burnette MD  Hospitalist Service  [facility]  Securely message with [employer] (more info)  Text page via [employer] Paging/Directory   ______________________________________________________________________    Interval History   Patient is sleeping this morning, woke up to her name.  Offers no complaints of pain.  Patient gets frustrated when asked about pain, and denies having any fracture, and is irritated by the fact that everyone keeps asking her if she has any pain.  No significant events overnight.    Physical Exam   Vital Signs: Temp: 98.1  F (36.7  C) Temp src: Oral BP: 117/57 Pulse: 69   Resp: 16 SpO2: 95 % O2 Device: None (Room air)    Weight: 147 lbs .75 oz    Constitutional: Awake, alert, cooperative, no apparent distress  Respiratory: Clear to auscultation bilaterally, no crackles or wheezing  Cardiovascular: Regular rate and rhythm, normal S1 and S2, and no murmur noted  GI: Normal bowel sounds, soft, non-distended, non-tender  Other: Alert, oriented to self, otherwise cognitive delay and confusion which is baseline.

## 2025-01-27 PROCEDURE — 250N000013 HC RX MED GY IP 250 OP 250 PS 637: Performed by: INTERNAL MEDICINE

## 2025-01-27 PROCEDURE — 99232 SBSQ HOSP IP/OBS MODERATE 35: CPT | Performed by: INTERNAL MEDICINE

## 2025-01-27 PROCEDURE — 120N000001 HC R&B MED SURG/OB

## 2025-01-27 RX ORDER — ACETAMINOPHEN 325 MG/1
325 TABLET ORAL EVERY 6 HOURS
Status: DISCONTINUED | OUTPATIENT
Start: 2025-01-27 | End: 2025-02-03 | Stop reason: HOSPADM

## 2025-01-27 RX ADMIN — LEVOTHYROXINE SODIUM 50 MCG: 50 TABLET ORAL at 06:48

## 2025-01-27 RX ADMIN — ACETAMINOPHEN 650 MG: 325 TABLET, FILM COATED ORAL at 10:01

## 2025-01-27 RX ADMIN — ACETAMINOPHEN 325 MG: 325 TABLET, FILM COATED ORAL at 18:56

## 2025-01-27 ASSESSMENT — ACTIVITIES OF DAILY LIVING (ADL)
ADLS_ACUITY_SCORE: 58
ADLS_ACUITY_SCORE: 66
ADLS_ACUITY_SCORE: 58
ADLS_ACUITY_SCORE: 66
ADLS_ACUITY_SCORE: 58
ADLS_ACUITY_SCORE: 66
ADLS_ACUITY_SCORE: 58
ADLS_ACUITY_SCORE: 66
ADLS_ACUITY_SCORE: 58
ADLS_ACUITY_SCORE: 66
ADLS_ACUITY_SCORE: 58
ADLS_ACUITY_SCORE: 66
ADLS_ACUITY_SCORE: 58

## 2025-01-27 NOTE — PLAN OF CARE
Goal Outcome Evaluation:       Trauma/Ortho/Medical (Choose one) Ortho     Diagnosis:Right tibial plateau fracture  POD#:non-surgical  Mental Status:A&O x2  Activity/dangle Assist of 2 with lift  Diet:regular, poor appetite   Pain: schedule tylenol  Souza/Voiding: incontinent of B&B, purewick in place  Tele/Restraints/Iso: NA  02/LDA:none  D/C Date:Pending placement  Other Info: RLE immobilizer on at all times. Refused to get out of bed this shift. Refused all scheduled medications except tylenol.

## 2025-01-27 NOTE — PROGRESS NOTES
[facility]    Medicine Progress Note - Hospitalist Service    Date of Admission:  1/12/2025    Assessment & Plan   Arline Link is a 84 year old, female, w a PMH of cognitive decline per family, rule out dementia, HTN, vitamin D deficiency, hypothyroidism, multiple falls, who was admitted on 1/12/2025, after another fall at home and found to have a right tibial plateau fracture on CT. Currently awaiting placement.     Fall with Closed fracture of right tibial plateau, initial encounter  Effusion of right knee  Weakness generalized    Falls frequently    --Admitted with acute pain after a fall, history of frequent falls, such that family has a home camera.  X-ray suggested and CT confirmed tibial plateau fracture.  Orthopedics consulted.  Nonoperative.  Recommends nonweightbearing RLE, with walker use.  Unable to discharge home, safety concerns.  --Non-WB Right LE with walker.  --Keep KI in place at all times beyond hygiene and daily skin checks.  --ASA 162mg daily x 6 weeks.   --Follow-up x-rays in 2 weeks.  These can be done at facility and sent to surgeon.  --Pain control with acetaminophen, note patient frequently declines.  PRN oxycodone available  --Therapies following, patient has been intermittently resistant to cares and medication.  Plan is to discharge to AL facility , patient will need wheel chair and hospital bed per CC  -- SW and CC following , might have bed available on Wednesday or Thursday   --Patient continues to remain stable awaiting discharge planning.     Neuro cognitive impairment with behavioral disturbance  Acute metabolic encephalopathy on suspected worsening dementia  --Per chart review Hx progressive neurocognitive impairment.  Lives alone.  Irritable, angry demanding and sarcastic outpatient at past visits.  Due to worsening cognition, appears doubtful she can return to home safely alone.  --Daughter has been very worried about the patient's progressive fall  is progressive cognitive impairment, with history favoring worsening dementia, and patient declining intervention/eval.  --Continue Seroquel 25 mg at 4 PM and at bedtime  --OT consulted for cognitive evaluation, patient refused this.  --Concern about ability to make medical decisions.  She seems to be oriented, although continues to dispute details about her medical care (doesn't believe her leg is broken).  -Dima Carrasco is the power of   --CT head (screening/none recent) ordered, but patient refused and this was discontinued.  --Vulnerable Adult report was filed on arrival     Possible GI bleed  Notified by nursing the patient has had 2 loose dark black bowel movements on 1/14/25.  No abdominal pain or nausea.  Vitals okay.  Repeat hemoglobin stable.  Patient denied any prior history of GI bleed, although history may be limited due to suspected dementia.    --Subsequent hemoglobin has been stable around 14.5  --Stool for occult blood negative on 1/14/2025  --Continue empiric Protonix  --No clinical evidence of ongoing GI bleeding at the moment.  --Ppx aspirin resumed and tolerating well.     Hyponatremia  Mild, asymptomatic.  --Resolved     History of hypothyroidism  --Continue PTA levothyroxine.     History of hypertension  --Continue prior to admission losartan     History of hypercholesterolemia  Not on a statin prior to admission.  --Follow up with primary care provider.     Patient has been stable for discharge, awaiting placement.   is involved.  Patient has a POA.  No new medical concerns over the last few days.        Diet: Regular Diet Adult  Snacks/Supplements Adult: Ensure Enlive; With Meals  Room Service    DVT Prophylaxis: Pneumatic Compression Devices  Souza Catheter: Not present  Lines: None     Cardiac Monitoring: None  Code Status: Full Code      Clinically Significant Risk Factors                   # Hypertension: Noted on problem list            # Overweight: Estimated  "body mass index is 26.05 kg/m  as calculated from the following:    Height as of this encounter: 1.6 m (5' 3\").    Weight as of this encounter: 66.7 kg (147 lb 0.8 oz).   # Moderate Malnutrition: based on nutrition assessment      # Financial/Environmental Concerns: none         Social Drivers of Health            Disposition Plan     Medically Ready for Discharge: Ready Now     Kerri Santos MD  Hospitalist Service  [facility]  Securely message with [employer] (more info)  Text page via [employer] Paging/Directory   ______________________________________________________________________    Interval History     Sitting in bed, denying any complaints, cooperative with exam, does get irritated when talk about discharge, told me that she does not want to go to the rehab.  Assured her that her grandson is involved in her discharge care planning which made patient happy, she told me that she is very close to her grandson.  She is otherwise denying any chest pain, palpitations or shortness of breath.    Physical Exam   Vital Signs: Temp: 98.1  F (36.7  C) Temp src: Oral BP: 108/66 Pulse: 87   Resp: 16 SpO2: 94 % O2 Device: None (Room air)    Weight: 147 lbs .75 oz    Constitutional: Awake, alert, cooperative, no apparent distress  Respiratory: Clear to auscultation bilaterally, no crackles or wheezing  Cardiovascular: Regular rate and rhythm, normal S1 and S2, and no murmur noted  GI: Normal bowel sounds, soft, non-distended, non-tender  Other: Alert, oriented to self, otherwise cognitive delay and confusion which is baseline.  "

## 2025-01-27 NOTE — PROGRESS NOTES
Care Management Follow Up    Length of Stay (days): 15    Expected Discharge Date: 01/27/2025     Concerns to be Addressed: discharge planning     Patient plan of care discussed at interdisciplinary rounds: Yes    Anticipated Discharge Disposition: Skilled Nursing Facility              Anticipated Discharge Services: None  Anticipated Discharge DME: None    Patient/family educated on Medicare website which has current facility and service quality ratings: no  Education Provided on the Discharge Plan: Yes  Patient/Family in Agreement with the Plan: yes    Referrals Placed by CM/SW: Post Acute Facilities  Private pay costs discussed: Not applicable    Discussed  Partnership in Safe Discharge Planning  document with patient/family: No     Handoff Completed: No, handoff not indicated or clinically appropriate    Additional Information:  Notes from weekend faxed to Zack at [facility] as requested, (p) [phone] (f) [phone].     Call from Zack to discuss discharge plan.    1530: aZck returned call and she said that they can admit patient Wed/ Thursday AM. She would like her there by 1100. She also asked that a hospital bed and wheelchair are ordered and delivered as they cannot for that under Medicare. Writer updated [facility] of request and she will message provider for order. Call to grandson to update. He said that he has spoken to Zack and they have all the paperwork they need.     Next Steps: will follow up with family and faciity when bed has been ordered    ROBINA Joshi

## 2025-01-27 NOTE — PROGRESS NOTES
Care Management Follow Up    Length of Stay (days): 15    Expected Discharge Date: 01/29/2025     Concerns to be Addressed: discharge planning     Patient plan of care discussed at interdisciplinary rounds: Yes    Anticipated Discharge Disposition: Skilled Nursing Facility              Anticipated Discharge Services: None  Anticipated Discharge DME: None    Patient/family educated on Medicare website which has current facility and service quality ratings: no  Education Provided on the Discharge Plan: Yes  Patient/Family in Agreement with the Plan: yes    Referrals Placed by CM/SW: Post Acute Facilities  Private pay costs discussed: Not applicable    Discussed  Partnership in Safe Discharge Planning  document with patient/family: No     Handoff Completed: Yes, [facility] PCP: Internal handoff referral completed    Additional Information:  Per , the [facility] will accept her Wednesday or Thursday but will require a wheelchair and hospital bed prior.      Destination:   [facility]  [address], [address]  [place], [address]    Sent Dr. Santos via [employer] a request for DME and face to face for a wheelchair and hospital bed.    Also sent [facility] a referral for home care RN (post hospital assessment), PT and OT.      Next Steps: send DME orders when obtained; need accepting home care.      Ban Harris, RN  Inpatient Float Care Coordinator  [facility]  [email or web address]

## 2025-01-27 NOTE — PLAN OF CARE
Overall Patient Progress: no change     Trauma/Ortho/Medical (Choose one) ortho     Diagnosis:Right tibial plateau fracture  POD#:non-surgical  Mental Status:A&O x2, easily anxious   Activity/dangle Assist of 2 with lift  Diet:regular, poor appetite   Pain:denies  Souza/Voiding: incontinent of B&B, purewick in place  Tele/Restraints/Iso: NA  02/LDA:none  D/C Date:Pending placement  Other Info: RLE immobilizer on at all times. Up in chair this shift. Refused turn/repo except with linen change and brief check and all medications.

## 2025-01-27 NOTE — PLAN OF CARE
Goal Outcome Evaluation:                   [id number]  Primary Diagnosis: closed fracture of right tibial plateau  Non surgical   Orientation: Aox4. Forgetful, pleasant  Aggression Stop Light: green  Activity: Assist of 2 with lift   Diet/BS Checks: regular, poor appetite   IV Access/Drains: no iv accesss  Pain Management: sched Tylenol refused   Abnormal VS/Results:VSS on RA  Bowel/Bladder: incont, no bm. PW in place  Consults: SW, ORTHO  D/C Disposition: pending JONN  Other Info: R immobilizer in place, Refused oral meds, CMS intact

## 2025-01-28 PROCEDURE — 99232 SBSQ HOSP IP/OBS MODERATE 35: CPT | Performed by: INTERNAL MEDICINE

## 2025-01-28 PROCEDURE — 250N000013 HC RX MED GY IP 250 OP 250 PS 637: Performed by: INTERNAL MEDICINE

## 2025-01-28 PROCEDURE — 120N000001 HC R&B MED SURG/OB

## 2025-01-28 RX ORDER — QUETIAPINE FUMARATE 25 MG/1
25 TABLET, FILM COATED ORAL EVERY 6 HOURS PRN
Qty: 10 TABLET | Refills: 0 | Status: SHIPPED | OUTPATIENT
Start: 2025-01-28

## 2025-01-28 RX ORDER — POLYETHYLENE GLYCOL 3350 17 G/17G
17 POWDER, FOR SOLUTION ORAL DAILY
Qty: 510 G | Refills: 0 | Status: SHIPPED | OUTPATIENT
Start: 2025-01-28

## 2025-01-28 RX ORDER — PANTOPRAZOLE SODIUM 40 MG/1
40 TABLET, DELAYED RELEASE ORAL
Qty: 30 TABLET | Refills: 0 | Status: SHIPPED | OUTPATIENT
Start: 2025-01-29

## 2025-01-28 RX ORDER — ASPIRIN 81 MG/1
81 TABLET ORAL DAILY
Qty: 30 TABLET | Refills: 0 | DISCHARGE
Start: 2025-01-28 | End: 2025-02-02

## 2025-01-28 RX ORDER — MULTIPLE VITAMINS W/ MINERALS TAB 9MG-400MCG
1 TAB ORAL DAILY
Qty: 30 TABLET | Refills: 0 | Status: SHIPPED | OUTPATIENT
Start: 2025-01-29

## 2025-01-28 RX ORDER — QUETIAPINE FUMARATE 25 MG/1
25 TABLET, FILM COATED ORAL
Qty: 30 TABLET | Refills: 0 | Status: SHIPPED | OUTPATIENT
Start: 2025-01-28

## 2025-01-28 RX ORDER — QUETIAPINE FUMARATE 25 MG/1
25 TABLET, FILM COATED ORAL AT BEDTIME
Qty: 30 TABLET | Refills: 0 | Status: SHIPPED | OUTPATIENT
Start: 2025-01-28

## 2025-01-28 RX ADMIN — ACETAMINOPHEN 325 MG: 325 TABLET, FILM COATED ORAL at 17:08

## 2025-01-28 RX ADMIN — LEVOTHYROXINE SODIUM 50 MCG: 50 TABLET ORAL at 06:39

## 2025-01-28 RX ADMIN — ACETAMINOPHEN 325 MG: 325 TABLET, FILM COATED ORAL at 10:42

## 2025-01-28 ASSESSMENT — ACTIVITIES OF DAILY LIVING (ADL)
ADLS_ACUITY_SCORE: 56
ADLS_ACUITY_SCORE: 58
ADLS_ACUITY_SCORE: 57
ADLS_ACUITY_SCORE: 56
ADLS_ACUITY_SCORE: 58
ADLS_ACUITY_SCORE: 56
ADLS_ACUITY_SCORE: 57
ADLS_ACUITY_SCORE: 58
ADLS_ACUITY_SCORE: 57
ADLS_ACUITY_SCORE: 56
ADLS_ACUITY_SCORE: 58
ADLS_ACUITY_SCORE: 57
ADLS_ACUITY_SCORE: 58
ADLS_ACUITY_SCORE: 57
ADLS_ACUITY_SCORE: 56
ADLS_ACUITY_SCORE: 57
ADLS_ACUITY_SCORE: 58
ADLS_ACUITY_SCORE: 57
ADLS_ACUITY_SCORE: 57
ADLS_ACUITY_SCORE: 56
ADLS_ACUITY_SCORE: 57

## 2025-01-28 NOTE — PROGRESS NOTES
Care Management Follow Up    Length of Stay (days): 16    Expected Discharge Date: 01/29/2025     Concerns to be Addressed: discharge planning     Patient plan of care discussed at interdisciplinary rounds: Yes    Anticipated Discharge Disposition: Skilled Nursing Facility              Anticipated Discharge Services: None  Anticipated Discharge DME: None    Patient/family educated on Medicare website which has current facility and service quality ratings: no  Education Provided on the Discharge Plan: Yes  Patient/Family in Agreement with the Plan: yes    Referrals Placed by CM/SW: Post Acute Facilities  Private pay costs discussed: Not applicable    Discussed  Partnership in Safe Discharge Planning  document with patient/family: No     Handoff Completed: Yes, [facility] PCP: Internal handoff referral completed    Additional Information:  DME orders and documentation were written for wheelchair and hospital bed.     Orders faxed to [facility] at [phone].    Will follow up in the morning.      Ban Harris RN  Inpatient Float Care Coordinator  [facility]  [email or web address]

## 2025-01-28 NOTE — PROGRESS NOTES
[facility]    Medicine Progress Note - Hospitalist Service    Date of Admission:  1/12/2025    Assessment & Plan   Arline Link is a 84 year old, female, w a PMH of cognitive decline per family, rule out dementia, HTN, vitamin D deficiency, hypothyroidism, multiple falls, who was admitted on 1/12/2025, after another fall at home and found to have a right tibial plateau fracture on CT. Currently awaiting placement.     Fall with Closed fracture of right tibial plateau, initial encounter  Effusion of right knee  Weakness generalized    Falls frequently    --Admitted with acute pain after a fall, history of frequent falls, such that family has a home camera.  X-ray suggested and CT confirmed tibial plateau fracture.  Orthopedics consulted.  Nonoperative.  Recommends nonweightbearing RLE, with walker use.  Unable to discharge home, safety concerns.  --Non-WB Right LE with walker.  --Keep KI in place at all times beyond hygiene and daily skin checks.  --ASA 162mg daily x 6 weeks.   --Follow-up x-rays in 2 weeks.  These can be done at facility and sent to surgeon.  --Pain control with acetaminophen, note patient frequently declines.  PRN oxycodone available  --Therapies following, patient has been intermittently resistant to cares and medication.  Plan is to discharge to AL facility , patient will need wheel chair and hospital bed per CC  -- SW and CC following , might have bed available on Wednesday or Thursday   -- Patient is requiring lift and assist, will order wheelchair and hospital bed for assisted living  -- Patient continues to remain stable awaiting discharge planning.     Neuro cognitive impairment with behavioral disturbance  Acute metabolic encephalopathy on suspected worsening dementia  --Per chart review Hx progressive neurocognitive impairment.  Lives alone.  Irritable, angry demanding and sarcastic outpatient at past visits.  Due to worsening cognition, appears doubtful she  can return to home safely alone.  --Daughter has been very worried about the patient's progressive fall is progressive cognitive impairment, with history favoring worsening dementia, and patient declining intervention/eval.  --Continue Seroquel 25 mg at 4 PM and at bedtime  --OT consulted for cognitive evaluation, patient refused this.  --Concern about ability to make medical decisions.  She seems to be oriented, although continues to dispute details about her medical care (doesn't believe her leg is broken).  -Dima Carrasco is the power of   --CT head (screening/none recent) ordered, but patient refused and this was discontinued.  --Vulnerable Adult report was filed on arrival     Possible GI bleed  Notified by nursing the patient has had 2 loose dark black bowel movements on 1/14/25.  No abdominal pain or nausea.  Vitals okay.  Repeat hemoglobin stable.  Patient denied any prior history of GI bleed, although history may be limited due to suspected dementia.    --Subsequent hemoglobin has been stable around 14.5  --Stool for occult blood negative on 1/14/2025  --Continue empiric Protonix  --No clinical evidence of ongoing GI bleeding at the moment.  --Ppx aspirin resumed and tolerating well.     Hyponatremia  Mild, asymptomatic.  --Resolved     History of hypothyroidism  --Continue PTA levothyroxine.     History of hypertension  --Continue prior to admission losartan     History of hypercholesterolemia  Not on a statin prior to admission.  --Follow up with primary care provider.     Patient has been stable for discharge, awaiting placement.   is involved. Patient has a POA.  No new medical concerns over the last few days.        Diet: Regular Diet Adult  Snacks/Supplements Adult: Ensure Enlive; With Meals  Room Service    DVT Prophylaxis: Pneumatic Compression Devices  Souza Catheter: Not present  Lines: None     Cardiac Monitoring: None  Code Status: Full Code      Clinically Significant Risk  "Factors                   # Hypertension: Noted on problem list            # Overweight: Estimated body mass index is 26.05 kg/m  as calculated from the following:    Height as of this encounter: 1.6 m (5' 3\").    Weight as of this encounter: 66.7 kg (147 lb 0.8 oz).   # Moderate Malnutrition: based on nutrition assessment      # Financial/Environmental Concerns: none         Social Drivers of Health            Disposition Plan     Medically Ready for Discharge: Ready Now     Kerri Santos MD  Hospitalist Service  [facility]  Securely message with [employer] (more info)  Text page via [employer] Paging/Directory   ______________________________________________________________________    Interval History     Patient was sitting in bed, denying any new complaints, cooperative with exam, discussed with her tentative discharge tomorrow or Thursday.  Patient is denying any chest pain, palpitations or shortness of breath    Physical Exam   Vital Signs: Temp: 97.5  F (36.4  C) Temp src: Oral BP: 118/64 Pulse: 84   Resp: 16 SpO2: 97 % O2 Device: None (Room air)    Weight: 147 lbs .75 oz    Constitutional: Awake, alert, cooperative, no apparent distress  Respiratory: Clear to auscultation bilaterally, no crackles or wheezing  Cardiovascular: Regular rate and rhythm, normal S1 and S2, and no murmur noted  GI: Normal bowel sounds, soft, non-distended, non-tender  Other: Alert, oriented to self, otherwise cognitive delay and confusion which is baseline.    Medical Decision Making       36 MINUTES SPENT BY ME on the date of service doing chart review, history, exam, documentation & further activities per the note.     "

## 2025-01-28 NOTE — PROGRESS NOTES
"DME (Durable Medical Equipment) Orders and Documentation  Orders Placed This Encounter   Procedures    Pneumatic Compression Device (Equipment) - Bilateral Calf    Wheelchair    Hospital Bed      The patient was assessed and it was determined the patient is in need of the following listed DME Supplies/Equipment. Please complete supporting documentation below to demonstrate medical necessity.      Hospital Bed/Accessories Documentation  Hospital bed is required for body positioning, to allow for safe transfers to wheelchair and standing and frequent changes in body position, not feasible in an ordinary bed     NOTE: Patient must have a \"Yes\" in one of the four following questions to qualify for a hospital bed.    1. Does the patient require positioning of the body in ways not feasible with an ordinary bed due to a medical condition that is expected to last at least 1 month? Yes (Please explain): Right tibial plateau fracture    2. Does the patient require, for the alleviation of pain, positioning of the body in ways not feasible with an ordinary bed? Yes (Please explain): Right tibial plateau fracture, advanced dementia     3. Does the patient require the head of the bed to be elevated more than 30 degrees most of the time due to congestive heart failure, chronic pulmonary disease, or aspiration? No    4. Does the patient require traction that can only be attached to a hospital bed? No    Additional Criteria:    Does the patient require frequent changes in body position and/or have an immediate need for change in body position? Yes - Patient qualifies for Semi Electric Bed    (MRADLs) entirely, safely A mobility limitation that impairs the ability to accomplish mobility-related activities of daily living or within a reasonable time frame      Trapeze Criteria:  (Patient must meet standard hospital bed criteria also)   1. Does patient need this device to sit up because of a respiratory condition, for change in body " position for other medical reasons, or to get in or out of bed? Yes (Please explain): Right tibial plateau fracture    Wheelchair Documentation  1. The patient has mobility limitations that impairs their ability to participate in one or more mobility related activities: Toileting, Grooming, and Bathing.  2. The patient's mobility limitations cannot be safely resolved by using a cane/walker:No  3. The patients home has adequate access to use a manual wheelchair:Yes  4. The use of a manual wheelchair on a regular basis will improve the patients ability to participate in mobility related ADL's at home:Yes  5. The patient is willing to use a manual wheelchair at home:Yes  6. The patient has adequate upper body strength and the mental capability to safely use a manual wheelchair and/or has a caregiver that is able to assist: Yes  7. Does the patient have a lower extremity injury or edema?Yes    Reason for Type of Wheelchair  Patient weight: 147 lbs .75 oz  Light Weight Wheelchair: Patient is unable to self-propel a standard wheelchair in the home but can self propel a light weight wheelchair.    Use of a manual wheelchair will significantly improve the patient's ability to participate in MRADLs and the patient will use it on a regular basis in the home. The patient has not expressed an unwillingness to use the manual wheelchair that is provided in the home.    (MRADLs) entirely, safely A mobility limitation that impairs the ability to accomplish mobility-related activities of daily living or within a reasonable time frame     Kerri Santos MD, [facility]

## 2025-01-28 NOTE — PLAN OF CARE
Goal Outcome Evaluation:      Date/Time: 1/27/2025 [id number]     Trauma/Ortho/Medical (Choose one) Ortho     Diagnosis: R Tibia plateau fracture  POD#: non surgical  Mental Status: Alert and oriented x 2  Activity/dangle: Lift  Diet: Regular  Pain: Refused pain medication  Souza/Voiding: incontinent / purewick  Tele/Restraints/Iso: NA  02/LDA: RA / No IV access  D/C Date: Pending TCU  Other Info:  Immobilizer in place , CMS intact.

## 2025-01-28 NOTE — PLAN OF CARE
Diagnosis:Right tibial plateau fracture  POD#:Non-surgical   Mental Status:Alert and oriented to self and situation   Activity/dangle: Lift, refused OOB to chair   Diet:Regular   Pain:Tylenol   Souza/Voiding:External cath   02/LDA:No PIV  D/C Date:Weds or Thurs to TCU   Other Info:Refused full skin check; occasionally refused repo; refused meds

## 2025-01-29 ENCOUNTER — MEDICAL CORRESPONDENCE (OUTPATIENT)
Dept: HEALTH INFORMATION MANAGEMENT | Facility: CLINIC | Age: 85
End: 2025-01-29
Payer: MEDICARE

## 2025-01-29 PROCEDURE — 250N000013 HC RX MED GY IP 250 OP 250 PS 637: Performed by: INTERNAL MEDICINE

## 2025-01-29 PROCEDURE — 120N000001 HC R&B MED SURG/OB

## 2025-01-29 PROCEDURE — 99232 SBSQ HOSP IP/OBS MODERATE 35: CPT | Performed by: INTERNAL MEDICINE

## 2025-01-29 RX ADMIN — LEVOTHYROXINE SODIUM 50 MCG: 50 TABLET ORAL at 07:06

## 2025-01-29 RX ADMIN — ACETAMINOPHEN 325 MG: 325 TABLET, FILM COATED ORAL at 16:34

## 2025-01-29 ASSESSMENT — ACTIVITIES OF DAILY LIVING (ADL)
ADLS_ACUITY_SCORE: 60
ADLS_ACUITY_SCORE: 59
ADLS_ACUITY_SCORE: 60
ADLS_ACUITY_SCORE: 59
ADLS_ACUITY_SCORE: 60
ADLS_ACUITY_SCORE: 59
ADLS_ACUITY_SCORE: 60
ADLS_ACUITY_SCORE: 59
ADLS_ACUITY_SCORE: 55
ADLS_ACUITY_SCORE: 60

## 2025-01-29 NOTE — PROGRESS NOTES
"CLINICAL NUTRITION SERVICES - REASSESSMENT NOTE     RECOMMENDATIONS FOR MDs/PROVIDERS TO ORDER:  None at this time    Malnutrition Status:    Moderate malnutrition in the context of chronic illness or injury (1/24)    Registered Dietitian Interventions:  Continue MVI/M  Discontinue Ensure Enlive - not taking  Encouraged po intakes emphasizing protein foods to enhance recovery and meet nutritional needs    Future/Additional Recommendations:  None at this time     SUBJECTIVE INFORMATION  Assessed patient in room.    CURRENT NUTRITION ORDERS  Diet: Regular  Supplements: 4 oz Ensure Enlive with breakfast and dinner  Nutrition Support: n/a    CURRENT INTAKE/TOLERANCE  - 10-50% intakes documented 2-3x/day for the past 3 days  - Patient reports eating is \"fine\" and appetite is \"okay\"  - When asked about Ensure, she states \"I don't drink that garbage.\" RD let patient know it will be discontinued and urged patient to continuing eating as tolerated - at least 3 meal trays/day - with an emphasis on protein. Patient reports enjoying protein foods, specifically BBQ chicken.      NEW FINDINGS  Weight: No updated weight - last weight: 1/20/25 66.7 kg (147 lb)  Skin/wounds: 1-2+ BLE edema; blanchable redness to sacrum/coccyx  GI symptoms: Appears last BM 1/23  Nutrition-relevant labs: Reviewed  Nutrition-relevant medications:  Levothyroxine, MVI/M - not given    MALNUTRITION  % Intake: </= 50% for >/= 5 days (severe) - during admit  % Weight Loss: Unable to assess d/t limited wt records available   Subcutaneous Fat Loss: Triceps: Mild, Orbital region: Mild  Muscle Loss: Wasting of the temples (temporalis muscle): Mild, Clavicles (pectoralis and deltoids): Mild, and Interosseous muscles: Moderate  Fluid Accumulation/Edema: None noted  Malnutrition Diagnosis: Moderate Malnutrition in the context of chronic illness or injury (1/24)  Malnutrition Present on Admission: Yes    EVALUATION OF THE PROGRESS TOWARD GOALS   Previous " Goals  Patient to consume >/=50-75% of nutritionally adequate meal trays TID, or the equivalent with supplements/snacks.  Evaluation: Progressing    Previous Nutrition Diagnosis  Inadequate oral intake related to poor mentation, dislike of food as evidenced by documented oral intake during admission, evidence of muscle and fat wasting on exam, and needing oral intake encouragement and oral nutrition supplements to meet estimated needs.  Evaluation: Improving, updated    NUTRITION DIAGNOSIS  Inadequate oral intake related to poor mentation, dislike of food as evidenced by 10-50% intakes this admission and evidence of muscle and fat wasting on exam    INTERVENTIONS  Encouraged po intakes emphasizing protein foods to enhance recovery and meet nutritional needs  Medical food supplement therapy - discontinue, not taking  MVI/M - continue    Goals  Patient to consume % of nutritionally adequate meal trays TID, or the equivalent with supplements/snacks.     Monitoring/Evaluation      Progress toward goals will be monitored and evaluated per policy.    Berkley Spangler RD, LD  Clinical Dietitian - [facility]

## 2025-01-29 NOTE — PLAN OF CARE
Diagnosis: R tibial plateau fx  POD#: non-surgical  Mental Status: A/O x2, disoriented to time and situation  Activity/dangle Ax2 with lift  Diet: Regular/Room service  Pain: Scheduled tylenol  Souza/Voiding: Incontinent at times, external catheter  Tele/Restraints/Iso: N/A  02/LDA: Room air  D/C Date: TCU pending  Other Info: Confusion baseline for patient. Pt forgetful, repeat herself and get frustrated when reoriented. R heel and calf mepilex to protect the skin, redness on coccyx, pt refused proper skin assessment. Turn & repo but patient refuses. Knee immobilizer on right leg at all the time, NWB RLE.

## 2025-01-29 NOTE — PROGRESS NOTES
[facility]    Medicine Progress Note - Hospitalist Service    Date of Admission:  1/12/2025    Assessment & Plan   Arline iLnk is a 84 year old, female, w a PMH of cognitive decline per family, rule out dementia, HTN, vitamin D deficiency, hypothyroidism, multiple falls, who was admitted on 1/12/2025, after another fall at home and found to have a right tibial plateau fracture on CT. Currently awaiting placement.       Fall with Closed fracture of right tibial plateau, initial encounter  Effusion of right knee  Weakness generalized    Falls frequently    --Admitted with acute pain after a fall, history of frequent falls, such that family has a home camera.  X-ray suggested and CT confirmed tibial plateau fracture.  Orthopedics consulted.  Nonoperative.  Recommends nonweightbearing RLE, with walker use.  Unable to discharge home, safety concerns.  --Non-WB Right LE with walker.  --Keep KI in place at all times beyond hygiene and daily skin checks.  --ASA 162mg daily x 6 weeks.   --Follow-up x-rays in 2 weeks.  These can be done at facility and sent to surgeon.  --Pain control with acetaminophen, note patient frequently declines.  PRN oxycodone available  --Therapies following, patient has been intermittently resistant to cares and medication.  Plan is to discharge to AL facility , patient will need wheel chair and hospital bed per CC  -- SW and CC following , might have bed available on Wednesday or Thursday   -- Patient is requiring lift and assist, will order wheelchair and hospital bed for assisted living  -- Patient continues to remain stable awaiting discharge planning.     Neuro cognitive impairment with behavioral disturbance  Acute metabolic encephalopathy on suspected worsening dementia  --Per chart review Hx progressive neurocognitive impairment.  Lives alone.  Irritable, angry demanding and sarcastic outpatient at past visits.  Due to worsening cognition, appears doubtful she  can return to home safely alone.  --Daughter has been very worried about the patient's progressive fall is progressive cognitive impairment, with history favoring worsening dementia, and patient declining intervention/eval.  --Continue Seroquel 25 mg at 4 PM and at bedtime  --OT consulted for cognitive evaluation, patient refused this.  --Concern about ability to make medical decisions.  She seems to be oriented, although continues to dispute details about her medical care (doesn't believe her leg is broken).  -Grandselena Carrasco is the power of   --CT head (screening/none recent) ordered, but patient refused and this was discontinued.  --Vulnerable Adult report was filed on arrival     Possible GI bleed  Notified by nursing the patient has had 2 loose dark black bowel movements on 1/14/25.  No abdominal pain or nausea.  Vitals okay.  Repeat hemoglobin stable.  Patient denied any prior history of GI bleed, although history may be limited due to suspected dementia.    --Subsequent hemoglobin has been stable around 14.5  --Stool for occult blood negative on 1/14/2025  --Continue empiric Protonix  --No clinical evidence of ongoing GI bleeding at the moment.  --Ppx aspirin resumed and tolerating well.     Hyponatremia  Mild, asymptomatic.  --Resolved     History of hypothyroidism  --Continue PTA levothyroxine.     History of hypertension  --Continue prior to admission losartan     History of hypercholesterolemia  Not on a statin prior to admission.  --Follow up with primary care provider.     Patient has been stable for discharge, awaiting placement.   is involved. Patient has a POA.  No new medical concerns over the last few days.        DME (Durable Medical Equipment) Orders and Documentation      Orders Placed This Encounter   Procedures    Pneumatic Compression Device (Equipment) - Bilateral Calf    Wheelchair    Hospital Bed      The patient was assessed and it was determined the patient is in need  "of the following listed DME Supplies/Equipment. Please complete supporting documentation below to demonstrate medical necessity.       Hospital Bed/Accessories Documentation  Hospital bed is required for body positioning, to allow for safe transfers to wheelchair and standing and frequent changes in body position, not feasible in an ordinary bed     NOTE: Patient must have a \"Yes\" in one of the four following questions to qualify for a hospital bed.     1. Does the patient require positioning of the body in ways not feasible with an ordinary bed due to a medical condition that is expected to last at least 1 month? Yes (Please explain): Right tibial plateau fracture     2. Does the patient require, for the alleviation of pain, positioning of the body in ways not feasible with an ordinary bed? Yes (Please explain): Right tibial plateau fracture, advanced dementia     3. Does the patient require the head of the bed to be elevated more than 30 degrees most of the time due to congestive heart failure, chronic pulmonary disease, or aspiration? No     4. Does the patient require traction that can only be attached to a hospital bed? No     Additional Criteria:    Does the patient require frequent changes in body position and/or have an immediate need for change in body position? Yes - Patient qualifies for Semi Electric Bed     (MRADLs) entirely, safely A mobility limitation that impairs the ability to accomplish mobility-related activities of daily living or within a reasonable time frame      Trapeze Criteria:  (Patient must meet standard hospital bed criteria also)   1. Does patient need this device to sit up because of a respiratory condition, for change in body position for other medical reasons, or to get in or out of bed? Yes (Please explain): Right tibial plateau fracture     Wheelchair Documentation  1. The patient has mobility limitations that impairs their ability to participate in one or more mobility related " "activities: Toileting, Grooming, and Bathing.  2. The patient's mobility limitations cannot be safely resolved by using a cane/walker:No  3. The patients home has adequate access to use a manual wheelchair:Yes  4. The use of a manual wheelchair on a regular basis will improve the patients ability to participate in mobility related ADL's at home:Yes  5. The patient is willing to use a manual wheelchair at home:Yes  6. The patient has adequate upper body strength and the mental capability to safely use a manual wheelchair and/or has a caregiver that is able to assist: Yes  7. Does the patient have a lower extremity injury or edema?Yes     Reason for Type of Wheelchair  Patient weight: 147 lbs .75 oz  Light Weight Wheelchair: Patient is unable to self-propel a standard wheelchair in the home but can self propel a light weight wheelchair.     Use of a manual wheelchair will significantly improve the patient's ability to participate in MRADLs and the patient will use it on a regular basis in the home. The patient has not expressed an unwillingness to use the manual wheelchair that is provided in the home.     (MRADLs) entirely, safely A mobility limitation that impairs the ability to accomplish mobility-related activities of daily living or within a reasonable time frame         Diet: Regular Diet Adult  Snacks/Supplements Adult: Ensure Enlive; With Meals  Room Service  Diet    DVT Prophylaxis: Pneumatic Compression Devices  Souza Catheter: Not present  Lines: None     Cardiac Monitoring: None  Code Status: Full Code      Clinically Significant Risk Factors                   # Hypertension: Noted on problem list            # Overweight: Estimated body mass index is 26.05 kg/m  as calculated from the following:    Height as of this encounter: 1.6 m (5' 3\").    Weight as of this encounter: 66.7 kg (147 lb 0.8 oz).   # Moderate Malnutrition: based on nutrition assessment      # Financial/Environmental Concerns: none     "     Social Drivers of Health            Disposition Plan     Medically Ready for Discharge: Ready Now     Kerri Santos MD  Hospitalist Service  [facility]  Securely message with [employer] (more info)  Text page via [employer] Paging/Directory   ______________________________________________________________________    Interval History     Patient is sitting in bed, denying any new complaints, cooperative with exam    Physical Exam   Vital Signs: Temp: 98.5  F (36.9  C) Temp src: Oral BP: 128/71 Pulse: 72   Resp: 16 SpO2: 97 % O2 Device: None (Room air)    Weight: 147 lbs .75 oz    Constitutional: Awake, alert, cooperative, no apparent distress  Respiratory: Clear to auscultation bilaterally, no crackles or wheezing  Cardiovascular: Regular rate and rhythm, normal S1 and S2, and no murmur noted  GI: Normal bowel sounds, soft, non-distended, non-tender  Other: Alert, oriented to self, otherwise cognitive delay and confusion which is baseline.    Medical Decision Making       32 MINUTES SPENT BY ME on the date of service doing chart review, history, exam, documentation & further activities per the note.

## 2025-01-29 NOTE — PLAN OF CARE
Goal Outcome Evaluation:      Plan of Care Reviewed With: patient    Overall Patient Progress: no changeOverall Patient Progress: no change             Goal Outcome Evaluation:     Date/Time: 1/29/2025 8512-3624     Trauma/Ortho/Medical:Ortho     Diagnosis: R Tibia plateau fracture 1/12/2025 admit  POD#: non surgical  Mental Status: Alert and oriented x 2  Activity/dangle: Lift non weight bearing on RLE/immobilizer  Diet: Regular-fair appetite.  Pain: Refused pain medication, denies pain. Refused all meds except synthroid.  Souza/Voiding: incontinent / purewick-incontinent.  Tele/Restraints/Iso: NA  02/LDA: RA / No IV access  D/C Date: Pending TCU  Other Info:  Immobilizer in place , CMS intact, blanchable redness hells, foam mepilex on for protection.

## 2025-01-29 NOTE — PROGRESS NOTES
"Care Management Follow Up    Length of Stay (days): 17    Expected Discharge Date: 01/29/2025     Concerns to be Addressed: discharge planning     Patient plan of care discussed at interdisciplinary rounds: Yes    Anticipated Discharge Disposition: Skilled Nursing Facility              Anticipated Discharge Services: None  Anticipated Discharge DME: None    Patient/family educated on Medicare website which has current facility and service quality ratings: no  Education Provided on the Discharge Plan: Yes  Patient/Family in Agreement with the Plan: yes    Referrals Placed by CM/SW: Post Acute Facilities  Private pay costs discussed: Not applicable    Discussed  Partnership in Safe Discharge Planning  document with patient/family: No     Handoff Completed: No, handoff not indicated or clinically appropriate    Additional Information:  Following for discharging planning; needing to have DME wheelchair and hospital bed in place prior to discharge.    The DME orders were sent late afternoon yesterday to [facility].    Spoke with Sumaya in the DME department of [facility].  She confirmed that the orders were received.  Writer updated with her the delivery address of: [facility], [facility] [address], [place] [address] Phone: [phone].    Sumaya has marked the order \"urgent\" due to delivery is what is holding discharge from the hospital.    She will review the order and call if anything is needed.  She anticipates the order will be ready 1-3 days.     [facility] will call here when delivery is known.    Next Steps: Discharge pending auth and delivery of DME to [facility].        Addendum at 1100:  [facility] faxed over Rx for bed and wheelchair as the original orders were lacking information.  Dr. Santos signed them and they were faxed to the fax numbers noted on each order.  Originals were put on the paper chart.    Addendum at 1130:  [facility] now informs we need additional verbage added to " "the medical record.    It is as follows:  \"(MRADLs) entirely, safely A mobility limitation that impairs the ability to accomplish mobility-related activities of daily living or within a reasonable time frame\".  Message sent to hospitalist.  -also called [facility] to inquire about discharge medications.  Left a voice mail message; do we fill meds here and send with her or do they need to be filled at a [facility] pharmacy?      Addendum at 1140:  corrected/revised DME documentation faxed back to [facility] at this time.      Addendum at 1240:  Spoke with Zack at [facility].  Even if DME is available tomorrow, they would not be able to accept patient as paperwork with the family would need to be completed prior.  They will not initiate the paperwork until the DME is delivered or delivery date is known.  No admissions on Friday through Sunday.  Monday would be the earliest admission as long as the DME is provided.  They would want her before noon, preferably 10-11 am.     Also they do prefer meds filled here and sent with the patient which has been done.    Addendum at 1:35 pm:  [facility] is delivering the DME today.  Left message with Zack at [facility] to see if any chance that may move the admittance to tomorrow.      Ban Harris RN  Inpatient Float Care Coordinator  [facility]  [email or web address]            "

## 2025-01-29 NOTE — PLAN OF CARE
Goal Outcome Evaluation:    Date/Time: 1/28/2025 7960-1284     Trauma/Ortho/Medical (Choose one) Ortho     Diagnosis: R Tibia plateau fracture  POD#: non surgical  Mental Status: Alert and oriented x 2  Activity/dangle: Lift  Diet: Regular  Pain: Refused pain medication  Souza/Voiding: incontinent / purewick  Tele/Restraints/Iso: NA  02/LDA: RA / No IV access  D/C Date: Pending TCU  Other Info:  Immobilizer in place , CMS intact

## 2025-01-30 VITALS
SYSTOLIC BLOOD PRESSURE: 111 MMHG | OXYGEN SATURATION: 96 % | BODY MASS INDEX: 26.05 KG/M2 | RESPIRATION RATE: 18 BRPM | HEIGHT: 63 IN | WEIGHT: 147.05 LBS | DIASTOLIC BLOOD PRESSURE: 57 MMHG | TEMPERATURE: 98.2 F | HEART RATE: 87 BPM

## 2025-01-30 PROCEDURE — 99232 SBSQ HOSP IP/OBS MODERATE 35: CPT | Performed by: INTERNAL MEDICINE

## 2025-01-30 PROCEDURE — 250N000013 HC RX MED GY IP 250 OP 250 PS 637: Performed by: INTERNAL MEDICINE

## 2025-01-30 PROCEDURE — G0463 HOSPITAL OUTPT CLINIC VISIT: HCPCS

## 2025-01-30 PROCEDURE — 250N000013 HC RX MED GY IP 250 OP 250 PS 637: Performed by: HOSPITALIST

## 2025-01-30 PROCEDURE — 250N000013 HC RX MED GY IP 250 OP 250 PS 637: Performed by: PHYSICIAN ASSISTANT

## 2025-01-30 PROCEDURE — 120N000001 HC R&B MED SURG/OB

## 2025-01-30 RX ORDER — PRAMOXINE HYDROCHLORIDE 10 MG/ML
LOTION TOPICAL EVERY 6 HOURS PRN
Status: DISCONTINUED | OUTPATIENT
Start: 2025-01-30 | End: 2025-02-03 | Stop reason: HOSPADM

## 2025-01-30 RX ADMIN — ACETAMINOPHEN 325 MG: 325 TABLET, FILM COATED ORAL at 10:00

## 2025-01-30 RX ADMIN — ACETAMINOPHEN 325 MG: 325 TABLET, FILM COATED ORAL at 03:39

## 2025-01-30 RX ADMIN — ASPIRIN 81 MG: 81 TABLET, COATED ORAL at 10:00

## 2025-01-30 RX ADMIN — ACETAMINOPHEN 325 MG: 325 TABLET, FILM COATED ORAL at 16:43

## 2025-01-30 RX ADMIN — PRAMOXINE HYDROCHLORIDE: 10 LOTION TOPICAL at 19:46

## 2025-01-30 RX ADMIN — QUETIAPINE FUMARATE 25 MG: 25 TABLET ORAL at 21:47

## 2025-01-30 ASSESSMENT — ACTIVITIES OF DAILY LIVING (ADL)
ADLS_ACUITY_SCORE: 62
ADLS_ACUITY_SCORE: 61
ADLS_ACUITY_SCORE: 55
ADLS_ACUITY_SCORE: 62
ADLS_ACUITY_SCORE: 55
ADLS_ACUITY_SCORE: 62
ADLS_ACUITY_SCORE: 55
ADLS_ACUITY_SCORE: 62
ADLS_ACUITY_SCORE: 55
ADLS_ACUITY_SCORE: 61
ADLS_ACUITY_SCORE: 61
ADLS_ACUITY_SCORE: 55
ADLS_ACUITY_SCORE: 62
ADLS_ACUITY_SCORE: 62
ADLS_ACUITY_SCORE: 55
ADLS_ACUITY_SCORE: 55

## 2025-01-30 NOTE — CONSULTS
"[facility] Nurse Inpatient Assessment     Consulted for: \"perineum, between the labia and anus\"    Summary: Incontinence associated dermatitis to labia and perineal area. No signs of fungal infection at this time.    [facility] nurse follow-up plan: signing off    Patient History (according to provider note(s):      \"Arline Link is a 84 year old, female, w a PMH of cognitive decline per family, rule out dementia, HTN, vitamin D deficiency, hypothyroidism, multiple falls, who was admitted on 1/12/2025, after another fall at home and found to have a right tibial plateau fracture on CT. Currently awaiting placement.\"     Assessment:      Areas visualized during today's visit: Focused:    Skin Injury Location: Labia and perineal area          Last photo: 1/30  Skin injury due to: Incontinence associated dermatitis (IAD)  Skin history and plan of care:   Patient incontinent of bowel and bladder. Previously had an external suction catheter, but she stated today that she \"hated that thing.\" Will start using Triad paste instead of CriticAid barrier cream.    Affected area: Labia/perineal area     Skin assessment: Erythema and very mild maceration     Measurements (length x width x depth, in cm) not measured      Color: pink     Temperature  normal      Drainage: none .      Color: none      Odor: none  Pain: mild  Pain interventions prior to dressing change: patient tolerated well and slow and gentle cares   Treatment goal: Infection control/prevention, Decrease moisture, and Protection  STATUS: initial assessment  Supplies ordered: gathered, at bedside, and discussed with patient        Treatment Plan:     Labia & Perineal area: BID and after every episode of incontinence  Cleanse skin with Lynda spray and soft white Emanuel cloths   Apply Triad paste to protect skin  Follow PIP plan    Pressure Injury Prevention (PIP) Plan:  If patient is declining pressure injury prevention interventions: " "Explore reason why and address patient's concerns, Educate on pressure injury risk and prevention intervention(s), If patient is still declining, document \"informed refusal\" , and Ensure Care team is aware ( provider, charge nurse, etc)  Mattress: Follow bed algorithm, add Low Air Loss (Air+) mattress pump if skin is very moist or constantly moist.   HOB: Maintain at or below 30 degrees, unless contraindicated  Repositioning in bed: Every 1-2 hours  and Raise foot of bed prior to raising head of bed, to reduce patient sliding down (shear)  Heels: Keep elevated off mattress and Pillows under calves  Protective Dressing: None  Positioning Equipment:None  Chair positioning: Do NOT use a donut for sitting (this increases pressure to smaller area and creates a higher potential for injury)    If patient has a buttock pressure injury, or high risk for PI use chair cushion or SPS.  Moisture Management: Perineal cleansing /protection: Follow Incontinence Protocol, Avoid brief in bed, Clean and dry skin folds with bathing , and Moisturize dry skin  Under Devices: Inspect skin under all medical devices during skin inspection , Ensure tubes are stabilized without tension, and Ensure patient is not lying on medical devices or equipment when repositioned  Ask provider to discontinue device when no longer needed.      Orders: Written    RECOMMEND PRIMARY TEAM ORDER: None, at this time  Education provided: importance of repositioning, plan of care, wound progress, Moisture management, and Hygiene  Discussed plan of care with: Patient and Nursing assistant  Notify WOC if wound(s) deteriorate.  Nursing to notify the Provider(s) and re-consult the WOC Nurse if new skin concern.    DATA:     Current support surface: Standard  Standard gel mattress (Isoflex)  Containment of urine/stool: Incontinence Protocol and Incontinent pad in bed  BMI: Body mass index is 26.05 kg/m .   Active diet order: Orders Placed This Encounter      Regular " "Diet Adult      Diet       Output:   I/O last 3 completed shifts:  In: 440 [P.O.:440]  Out: 700 [Urine:700]     Labs:   No lab results found in last 7 days.    Invalid input(s): \"GLUCOMBO\"  Pressure injury risk assessment:   Sensory Perception: 3-->slightly limited  Moisture: 2-->very moist  Activity: 1-->bedfast  Mobility: 2-->very limited  Nutrition: 2-->probably inadequate  Friction and Shear: 2-->potential problem  Delmar Score: 12    Berkley Cooper RN CWCN  Pager no longer is use, please contact through [employer] group: WO Nurse Nimo    "

## 2025-01-30 NOTE — PLAN OF CARE
Goal Outcome Evaluation:      Date/Time: 1/29/25 5288-5367     Trauma/Ortho/Medical (Choose one) Trauma     Diagnosis:Right non-displaced lateral tibial plateau fracture, non-operative management  Mental Status:Alert to self, disoriented to time, place and situation, intermittent confusion, forgetful  Activity/dangle: Turned and repositioned, NWB RLE. Stayed in bed the entire shift. A2 with lift.  Diet: Regular  Pain:Took scheduled Tylenol  Souza/Voiding: Incontinent of both bowel and bladder.  Tele/Restraints/Iso:N/A  02/LDA: On RA. No IV access.  D/C Date: Possible discharge tomorrow @ [facility]  Other Info: VSS. Refused some of her scheduled meds. Immobilizer on the RLE on @ all times. WOC consult n place for moisture associated wound on the perineal area.

## 2025-01-30 NOTE — PLAN OF CARE
Goal Outcome Evaluation:         1/29 6929-2368    Orientation: A&O to self, quick to frustration   Aggression Stop Light: Green  Activity: A2 w/lift; refused out of bed  Diet/BS Checks: Regular   Tele: N/A  IV Access/Drains: No IV, MD aware  Pain Management: Scheduled Tylenol given for R leg pain  Abnormal VS/Results: VSS on RA  Bowel/Bladder: Purewick in place for incontinence   Skin/Wounds: Scattered bruises, blanchable redness to coccyx, CELINA R leg under knee immobilizer   Consults: SW/CC, Ortho  D/C Disposition: TBD, possible Monday  Other Info:     -Pt refused evening Seroquel  -Grandson brought clean clothes, helped pt changed shirts, refused all other cares

## 2025-01-30 NOTE — PLAN OF CARE
Goal Outcome Evaluation:  Trauma/Ortho/Medical (Choose one)  trauma  Diagnosis: right tibia plateau fracture S/P fall - non surgical   Mental Status: A/Ox2 - disorientated to time and situation  Activity/dangle lift  Diet: regular  Pain: scheduled Tylenol  Souza/Voiding: incontinent  02/LDA: RA/no IV access  D/C Date: [facility] Monday via W/C ride

## 2025-01-30 NOTE — PROVIDER NOTIFICATION
MD Notification    Notified Person: MD    Notified Person Name: Dr. Santos    Notification Date/Time: 1654 1/30/25    Notification Interaction: [employer] webpage    Purpose of Notification: pt wondering if there is a type of cream that can be applied to her feet to relax them, she feels they are very stiff    Orders Received:    Comments:

## 2025-01-30 NOTE — PROGRESS NOTES
[facility]    Medicine Progress Note - Hospitalist Service    Date of Admission:  1/12/2025    Assessment & Plan   Arline Link is a 84 year old, female, w a PMH of cognitive decline per family, rule out dementia, HTN, vitamin D deficiency, hypothyroidism, multiple falls, who was admitted on 1/12/2025, after another fall at home and found to have a right tibial plateau fracture on CT. Currently awaiting placement.       Fall with Closed fracture of right tibial plateau, initial encounter  Effusion of right knee  Weakness generalized    Falls frequently    --Admitted with acute pain after a fall, history of frequent falls, such that family has a home camera.  X-ray suggested and CT confirmed tibial plateau fracture.  Orthopedics consulted.  Nonoperative.  Recommends nonweightbearing RLE, with walker use.  Unable to discharge home, safety concerns.  --Non-WB Right LE with walker.  --Keep KI in place at all times beyond hygiene and daily skin checks.  --ASA 162mg daily x 6 weeks.   --Follow-up x-rays in 2 weeks.  These can be done at facility and sent to surgeon.  --Pain control with acetaminophen, note patient frequently declines.  PRN oxycodone available  --Therapies following, patient has been intermittently resistant to cares and medication.  Plan is to discharge to AL facility , patient will need wheel chair and hospital bed per CC  -- SW and CC following , might have bed available on Wednesday or Thursday   -- Patient is requiring lift and assist, will order wheelchair and hospital bed for assisted living  -- Patient continues to remain stable awaiting discharge planning.     Neuro cognitive impairment with behavioral disturbance  Acute metabolic encephalopathy on suspected worsening dementia  --Per chart review Hx progressive neurocognitive impairment.  Lives alone.  Irritable, angry demanding and sarcastic outpatient at past visits.  Due to worsening cognition, appears doubtful she  can return to home safely alone.  --Daughter has been very worried about the patient's progressive fall is progressive cognitive impairment, with history favoring worsening dementia, and patient declining intervention/eval.  --Continue Seroquel 25 mg at 4 PM and at bedtime  --OT consulted for cognitive evaluation, patient refused this.  --Concern about ability to make medical decisions.  She seems to be oriented, although continues to dispute details about her medical care (doesn't believe her leg is broken).  -Dima Carrasco is the power of   --CT head (screening/none recent) ordered, but patient refused and this was discontinued.  --Vulnerable Adult report was filed on arrival     Possible GI bleed  Notified by nursing the patient has had 2 loose dark black bowel movements on 1/14/25.  No abdominal pain or nausea.  Vitals okay.  Repeat hemoglobin stable.  Patient denied any prior history of GI bleed, although history may be limited due to suspected dementia.    --Subsequent hemoglobin has been stable around 14.5  --Stool for occult blood negative on 1/14/2025  --Continue empiric Protonix  --No clinical evidence of ongoing GI bleeding at the moment.  --Ppx aspirin resumed and tolerating well.     Hyponatremia  Mild, asymptomatic.  --Resolved     History of hypothyroidism  --Continue PTA levothyroxine.     History of hypertension  -- Will hold losartan, patient blood pressure has been running soft has not been receiving it for past few days     History of hypercholesterolemia  Not on a statin prior to admission.  --Follow up with primary care provider.     Patient has been stable for discharge, awaiting placement.   is involved. Patient has a POA.  No new medical concerns over the last few days.    DME documentation completed ( see note from 01/29/25)     Diet: Regular Diet Adult  Room Service  Diet    DVT Prophylaxis: Pneumatic Compression Devices  Souza Catheter: Not present  Lines: None    "  Cardiac Monitoring: None  Code Status: Full Code      Clinically Significant Risk Factors                   # Hypertension: Noted on problem list            # Overweight: Estimated body mass index is 26.05 kg/m  as calculated from the following:    Height as of this encounter: 1.6 m (5' 3\").    Weight as of this encounter: 66.7 kg (147 lb 0.8 oz).   # Moderate Malnutrition: based on nutrition assessment      # Financial/Environmental Concerns: none         Social Drivers of Health            Disposition Plan     Medically Ready for Discharge: Ready Now   DME paperwork completed for hospital bed and wheelchair management, unfortunately facility will not accept patient till Monday morning.  Will continue to monitor    Kerri Santos MD  Hospitalist Service  [facility]  Securely message with [employer] (more info)  Text page via [employer] Paging/Directory   ______________________________________________________________________    Interval History     Patient was seen and examined, denying any new complaints, hoping to be discharged soon.    Physical Exam   Vital Signs: Temp: 98.2  F (36.8  C) Temp src: Oral BP: 107/57 Pulse: 85   Resp: 18 SpO2: 96 % O2 Device: None (Room air)    Weight: 147 lbs .75 oz    Constitutional: Awake, alert, cooperative, no apparent distress  Respiratory: Clear to auscultation bilaterally, no crackles or wheezing  Cardiovascular: Regular rate and rhythm, normal S1 and S2, and no murmur noted  GI: Normal bowel sounds, soft, non-distended, non-tender  Other: Alert, oriented to self, otherwise cognitive delay and confusion which is baseline.    Medical Decision Making       33 MINUTES SPENT BY ME on the date of service doing chart review, history, exam, documentation & further activities per the note.     "

## 2025-01-30 NOTE — PROGRESS NOTES
Care Management Follow Up    Length of Stay (days): 18    Expected Discharge Date:      Concerns to be Addressed: discharge planning     Patient plan of care discussed at interdisciplinary rounds: Yes    Anticipated Discharge Disposition: Skilled Nursing Facility              Anticipated Discharge Services: None  Anticipated Discharge DME: None    Patient/family educated on Medicare website which has current facility and service quality ratings: no  Education Provided on the Discharge Plan: Yes  Patient/Family in Agreement with the Plan: yes    Referrals Placed by CM/SW: Post Acute Facilities  Private pay costs discussed: Not applicable    Discussed  Partnership in Safe Discharge Planning  document with patient/family: No     Handoff Completed: Yes, [facility] PCP: Internal handoff referral completed    Additional Information:  Spoke with Zack at [facility] ([phone]); confirmed that the DME were delivered yesterday and patient can be admitted on Monday morning.    Next Steps: on Saturday need to schedule wheelchair ride for Monday 9 am or so-  for arrival to [facility] 10-11 am.      Ban Harris RN  Inpatient Float Care Coordinator  [facility]  [email or web address]

## 2025-01-30 NOTE — PLAN OF CARE
Goal Outcome Evaluation:      Plan of Care Reviewed With: patient    Overall Patient Progress: no changeOverall Patient Progress: no change     [id number]  Orientation: A&O to self, anxious and frustrated  Aggression Stop Light: Green  Activity: A2 w/lift; not oob  Diet/BS Checks: Regular   Tele: N/A  IV Access/Drains: No IV, MD aware  Pain Management: Scheduled Tylenol given for R leg pain  Abnormal VS/Results: VSS on RA, ex. Hypotension x1 asymptomatic  Bowel/Bladder: incontinent, purewick break for skin integrity   Skin/Wounds: Scattered bruises, blanchable redness to coccyx, CELINA R leg under knee immobilizer   Consults: SW/CC, Ortho  D/C Disposition: TBD, possible Monday  Other Info:      -Pt refused evening Seroquel  -Grandson visited in afternoon

## 2025-01-31 PROCEDURE — 250N000013 HC RX MED GY IP 250 OP 250 PS 637: Performed by: INTERNAL MEDICINE

## 2025-01-31 PROCEDURE — 250N000013 HC RX MED GY IP 250 OP 250 PS 637: Performed by: PHYSICIAN ASSISTANT

## 2025-01-31 PROCEDURE — 99232 SBSQ HOSP IP/OBS MODERATE 35: CPT | Performed by: INTERNAL MEDICINE

## 2025-01-31 PROCEDURE — 120N000001 HC R&B MED SURG/OB

## 2025-01-31 RX ORDER — PRAMOXINE HYDROCHLORIDE 10 MG/ML
LOTION TOPICAL EVERY 6 HOURS PRN
DISCHARGE
Start: 2025-01-31

## 2025-01-31 RX ADMIN — QUETIAPINE FUMARATE 25 MG: 25 TABLET ORAL at 21:51

## 2025-01-31 RX ADMIN — ACETAMINOPHEN 325 MG: 325 TABLET, FILM COATED ORAL at 01:04

## 2025-01-31 ASSESSMENT — ACTIVITIES OF DAILY LIVING (ADL)
ADLS_ACUITY_SCORE: 68
ADLS_ACUITY_SCORE: 61
ADLS_ACUITY_SCORE: 68
ADLS_ACUITY_SCORE: 68
ADLS_ACUITY_SCORE: 61
ADLS_ACUITY_SCORE: 68
ADLS_ACUITY_SCORE: 61
ADLS_ACUITY_SCORE: 61
ADLS_ACUITY_SCORE: 68
ADLS_ACUITY_SCORE: 68
ADLS_ACUITY_SCORE: 61
ADLS_ACUITY_SCORE: 68
ADLS_ACUITY_SCORE: 61
ADLS_ACUITY_SCORE: 68
ADLS_ACUITY_SCORE: 61

## 2025-01-31 NOTE — PLAN OF CARE
Goal Outcome Evaluation:      Plan of Care Reviewed With: patient    Overall Patient Progress: no changeOverall Patient Progress: no change        Date/Time: 1/30/25 [id number]     Trauma/Ortho/Medical (Choose one) Trauma     Diagnosis:Right non-displaced lateral tibial plateau fracture, non-operative management  Mental Status:Alert to self, disoriented to time, place and situation, intermittent confusion, forgetful  Activity/dangle: Turned and repositioned, NWB RLE. Stayed in bed the entire shift. A2 with lift.  Diet: Regular  Pain:Took scheduled Tylenol  Souza/Voiding: Incontinent of both bowel and bladder.  Tele/Restraints/Iso:N/A  02/LDA: On RA. No IV access.  D/C Date: Discharging on Monday @ [facility] via wheelchair ride  Other Info: VSS. Refused some of her scheduled meds. Immobilizer on the RLE on @ all times.  PRN Pramoxine lotion applied in BLE.

## 2025-01-31 NOTE — PROGRESS NOTES
"SPIRITUAL HEALTH SERVICES - Progress Note  [facility]    Referral Source: [facility] Staff, length of stay    Arline expressed her desire to \"move on\" from hospital care to her long-term home.  She spoke of her children, grandchildren, and great-grandchildren as sources of emotional support. She wishes she could see them more often.  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  She also shared warm recollections of her mother and grandmother.   Arline is a \"[employer]\" [employer] and finds comfort in praying. She \"speaks to God all the time, all day long.\" She does not have a [employer] affiliation and declined offers of prayer or other spiritual resources.    Plan: Arline would welcome another  visit if possible. She is aware that [facility] remains available per patient request.    Shaniqua Odonnell, PhD   Intern    [facility] available 24/7 for emergent requests/referrals, either by paging the on-call  or by entering an ASAP/STAT consult in [facility], which will also page the on-call .   "

## 2025-01-31 NOTE — PROGRESS NOTES
[facility]    Medicine Progress Note - Hospitalist Service    Date of Admission:  1/12/2025    Assessment & Plan   Arline Link is a 84 year old, female, w a PMH of cognitive decline per family, rule out dementia, HTN, vitamin D deficiency, hypothyroidism, multiple falls, who was admitted on 1/12/2025, after another fall at home and found to have a right tibial plateau fracture on CT. Currently awaiting placement.       Fall with Closed fracture of right tibial plateau, initial encounter  Effusion of right knee  Weakness generalized    Falls frequently    --Admitted with acute pain after a fall, history of frequent falls, such that family has a home camera.  X-ray suggested and CT confirmed tibial plateau fracture.  Orthopedics consulted.  Nonoperative.  Recommends nonweightbearing RLE, with walker use.  Unable to discharge home, safety concerns.  --Non-WB Right LE with walker.  --Keep KI in place at all times beyond hygiene and daily skin checks.  --ASA 162mg daily x 6 weeks.   --Follow-up x-rays in 2 weeks.  These can be done at facility and sent to surgeon.  --Pain control with acetaminophen, note patient frequently declines.  PRN oxycodone available  --Therapies following, patient has been intermittently resistant to cares and medication.  Plan is to discharge to AL facility , patient will need wheel chair and hospital bed per CC  -- SW and CC following , might have bed available on Wednesday or Thursday   -- Patient is requiring lift and assist, will order wheelchair and hospital bed for assisted living  -- Patient continues to remain stable awaiting discharge planning.     Neuro cognitive impairment with behavioral disturbance  Acute metabolic encephalopathy on suspected worsening dementia  --Per chart review Hx progressive neurocognitive impairment.  Lives alone.  Irritable, angry demanding and sarcastic outpatient at past visits.  Due to worsening cognition, appears doubtful she  can return to home safely alone.  --Daughter has been very worried about the patient's progressive fall is progressive cognitive impairment, with history favoring worsening dementia, and patient declining intervention/eval.  --Continue Seroquel 25 mg at 4 PM and at bedtime  --OT consulted for cognitive evaluation, patient refused this.  --Concern about ability to make medical decisions.  She seems to be oriented, although continues to dispute details about her medical care (doesn't believe her leg is broken).  -Dima Carrasco is the power of   --CT head (screening/none recent) ordered, but patient refused and this was discontinued.  --Vulnerable Adult report was filed on arrival     Possible GI bleed  Notified by nursing the patient has had 2 loose dark black bowel movements on 1/14/25.  No abdominal pain or nausea.  Vitals okay.  Repeat hemoglobin stable.  Patient denied any prior history of GI bleed, although history may be limited due to suspected dementia.    --Subsequent hemoglobin has been stable around 14.5  --Stool for occult blood negative on 1/14/2025  --Continue empiric Protonix  --No clinical evidence of ongoing GI bleeding at the moment.  --Ppx aspirin resumed and tolerating well.     Hyponatremia  Mild, asymptomatic.  --Resolved     History of hypothyroidism  --Continue PTA levothyroxine.     History of hypertension  -- Will hold losartan, patient blood pressure has been running soft has not been receiving it for past few days     History of hypercholesterolemia  Not on a statin prior to admission.  --Follow up with primary care provider.     Patient has been stable for discharge, awaiting placement.   is involved. Patient has a POA.  No new medical concerns over the last few days.    DME documentation completed ( see note from 01/29/25)     Diet: Regular Diet Adult  Room Service  Diet    DVT Prophylaxis: Pneumatic Compression Devices  Souza Catheter: Not present  Lines: None    "  Cardiac Monitoring: None  Code Status: Full Code      Clinically Significant Risk Factors                   # Hypertension: Noted on problem list            # Overweight: Estimated body mass index is 26.05 kg/m  as calculated from the following:    Height as of this encounter: 1.6 m (5' 3\").    Weight as of this encounter: 66.7 kg (147 lb 0.8 oz).   # Moderate Malnutrition: based on nutrition assessment      # Financial/Environmental Concerns: none         Social Drivers of Health            Disposition Plan     Medically Ready for Discharge: Ready Now   DME paperwork completed for hospital bed and wheelchair management, unfortunately facility will not accept patient till Monday morning.  Will continue to monitor    Kerri Santos MD  Hospitalist Service  [facility]  Securely message with [employer] (more info)  Text page via [employer] Paging/Directory   ______________________________________________________________________    Interval History     Patient was seen and examined, denying any new complaints, discussed with her regarding plan for discharge on Monday, patient was slightly tearful regarding staying here over the weekend, emotional support was provided she was thankful for the care she has been receiving.    Physical Exam   Vital Signs: Temp: 97.8  F (36.6  C) Temp src: Oral BP: 138/89 Pulse: 109   Resp: 18 SpO2: 97 % O2 Device: None (Room air)    Weight: 147 lbs .75 oz    Constitutional: Awake, alert, cooperative, no apparent distress  Respiratory: Clear to auscultation bilaterally, no crackles or wheezing  Cardiovascular: Regular rate and rhythm, normal S1 and S2, and no murmur noted  GI: Normal bowel sounds, soft, non-distended, non-tender  Other: Alert, oriented to self, otherwise cognitive delay and confusion which is baseline.    Medical Decision Making       34 MINUTES SPENT BY ME on the date of service doing chart review, history, exam, documentation & further activities per the " note.

## 2025-02-01 PROCEDURE — 250N000013 HC RX MED GY IP 250 OP 250 PS 637: Performed by: PHYSICIAN ASSISTANT

## 2025-02-01 PROCEDURE — 120N000001 HC R&B MED SURG/OB

## 2025-02-01 PROCEDURE — 250N000013 HC RX MED GY IP 250 OP 250 PS 637: Performed by: INTERNAL MEDICINE

## 2025-02-01 PROCEDURE — 99232 SBSQ HOSP IP/OBS MODERATE 35: CPT | Performed by: INTERNAL MEDICINE

## 2025-02-01 RX ADMIN — QUETIAPINE FUMARATE 25 MG: 25 TABLET ORAL at 21:06

## 2025-02-01 RX ADMIN — ACETAMINOPHEN 325 MG: 325 TABLET, FILM COATED ORAL at 00:04

## 2025-02-01 RX ADMIN — ACETAMINOPHEN 325 MG: 325 TABLET, FILM COATED ORAL at 16:50

## 2025-02-01 ASSESSMENT — ACTIVITIES OF DAILY LIVING (ADL)
ADLS_ACUITY_SCORE: 64
ADLS_ACUITY_SCORE: 59
ADLS_ACUITY_SCORE: 64
ADLS_ACUITY_SCORE: 59
ADLS_ACUITY_SCORE: 64
ADLS_ACUITY_SCORE: 64

## 2025-02-01 NOTE — PROGRESS NOTES
Care Management Follow Up    Length of Stay (days): 20    Expected Discharge Date: 02/03/2025     Concerns to be Addressed: discharge planning     Patient plan of care discussed at interdisciplinary rounds: Yes    Anticipated Discharge Disposition: Skilled Nursing Facility              Anticipated Discharge Services: None  Anticipated Discharge DME: None    Patient/family educated on Medicare website which has current facility and service quality ratings: no  Education Provided on the Discharge Plan: Yes  Patient/Family in Agreement with the Plan: yes    Referrals Placed by CM/SW: Post Acute Facilities  Private pay costs discussed: Not applicable    Discussed  Partnership in Safe Discharge Planning  document with patient/family: No     Handoff Completed: Yes, [facility] PCP: Internal handoff referral completed    Additional Information:  Writer arranged wheelchair transport with Eric at [facility] transport for Monday, Feb. 3rd between 8:38 and 9:23 AM to [facility] [address], [place] [address] Phone: [phone]. Writer left message with Zack at [facility] ([phone])     Next Steps: Notify discharging provider of ride time and need for orders by 7:30 AM.    Stacy Jiang RN Care Coordinator  [facility]  [phone]

## 2025-02-01 NOTE — PROGRESS NOTES
[facility]    Medicine Progress Note - Hospitalist Service    Date of Admission:  1/12/2025    Assessment & Plan   Arline Link is a 84 year old, female, w a PMH of cognitive decline per family, rule out dementia, HTN, vitamin D deficiency, hypothyroidism, multiple falls, who was admitted on 1/12/2025, after another fall at home and found to have a right tibial plateau fracture on CT. Currently awaiting placement.       Fall with Closed fracture of right tibial plateau, initial encounter  Effusion of right knee  Weakness generalized    Falls frequently      --Admitted with acute pain after a fall, history of frequent falls, such that family has a home camera.  X-ray suggested and CT confirmed tibial plateau fracture.  Orthopedics consulted.  Nonoperative.  Recommends nonweightbearing RLE, with walker use.  Unable to discharge home, safety concerns.  --Non-WB Right LE with walker.  --Keep KI in place at all times beyond hygiene and daily skin checks.  --ASA 162mg daily x 6 weeks.   --Follow-up x-rays in 2 weeks.  These can be done at facility and sent to surgeon.  --Pain control with acetaminophen, note patient frequently declines.  PRN oxycodone available  --Therapies following, patient has been intermittently resistant to cares and medication.  Plan is to discharge to AL facility , patient will need wheel chair and hospital bed per CC  -- SW and CC following , might have bed available on Wednesday or Thursday   -- Patient is requiring lift and assist, will order wheelchair and hospital bed for assisted living  -- Patient continues to remain stable awaiting discharge planning.     Neuro cognitive impairment with behavioral disturbance  Acute metabolic encephalopathy on suspected worsening dementia  --Per chart review Hx progressive neurocognitive impairment.  Lives alone.  Irritable, angry demanding and sarcastic outpatient at past visits.  Due to worsening cognition, appears doubtful  she can return to home safely alone.  --Daughter has been very worried about the patient's progressive fall is progressive cognitive impairment, with history favoring worsening dementia, and patient declining intervention/eval.  --Continue Seroquel 25 mg at 4 PM and at bedtime  --OT consulted for cognitive evaluation, patient refused this.  --Concern about ability to make medical decisions.  She seems to be oriented, although continues to dispute details about her medical care (doesn't believe her leg is broken).  -Dima Carrasco is the power of   --CT head (screening/none recent) ordered, but patient refused and this was discontinued.  --Vulnerable Adult report was filed on arrival     Possible GI bleed  Notified by nursing the patient has had 2 loose dark black bowel movements on 1/14/25.  No abdominal pain or nausea.  Vitals okay.  Repeat hemoglobin stable.  Patient denied any prior history of GI bleed, although history may be limited due to suspected dementia.    --Subsequent hemoglobin has been stable around 14.5  --Stool for occult blood negative on 1/14/2025  --Continue empiric Protonix  --No clinical evidence of ongoing GI bleeding at the moment.  --Ppx aspirin resumed and tolerating well.     Hyponatremia  Mild, asymptomatic.  --Resolved     History of hypothyroidism  --Continue PTA levothyroxine.     History of hypertension  -- Will hold losartan, patient blood pressure has been running soft has not been receiving it for past few days     History of hypercholesterolemia  Not on a statin prior to admission.  --Follow up with primary care provider.     Patient has been stable for discharge, awaiting placement.   is involved. Patient has a POA.  No new medical concerns over the last few days.    DME documentation completed ( see note from 01/29/25)     Diet: Regular Diet Adult  Room Service  Diet    DVT Prophylaxis: Pneumatic Compression Devices  Souza Catheter: Not present  Lines: None    "  Cardiac Monitoring: None  Code Status: Full Code      Clinically Significant Risk Factors                   # Hypertension: Noted on problem list            # Overweight: Estimated body mass index is 26.05 kg/m  as calculated from the following:    Height as of this encounter: 1.6 m (5' 3\").    Weight as of this encounter: 66.7 kg (147 lb 0.8 oz).   # Moderate Malnutrition: based on nutrition assessment      # Financial/Environmental Concerns: none         Social Drivers of Health            Disposition Plan     Medically Ready for Discharge: Ready Now   DME paperwork completed for hospital bed and wheelchair management, unfortunately facility will not accept patient till Monday morning.  Will continue to monitor    Kerri Santos MD  Hospitalist Service  [facility]  Securely message with [employer] (more info)  Text page via [employer] Paging/Directory   ______________________________________________________________________    Interval History     Patient was seen and examined, denying any new complaints.  Discussed with her regarding plan for discharge on Monday, slightly discouraged regarding staying here over the weekend but understands.  Cooperative with examination.    Physical Exam   Vital Signs: Temp: 97.7  F (36.5  C) Temp src: Oral BP: 106/52 Pulse: 76   Resp: 16 SpO2: 96 % O2 Device: None (Room air)    Weight: 147 lbs .75 oz    Constitutional: Awake, alert, cooperative, no apparent distress  Respiratory: Clear to auscultation bilaterally, no crackles or wheezing  Cardiovascular: Regular rate and rhythm, normal S1 and S2, and no murmur noted  GI: Normal bowel sounds, soft, non-distended, non-tender  Other: Alert, oriented to self, otherwise cognitive delay and confusion which is baseline.    Medical Decision Making       33 MINUTES SPENT BY ME on the date of service doing chart review, history, exam, documentation & further activities per the note.     "

## 2025-02-01 NOTE — PROGRESS NOTES
1/31/2025 [id number]    Diagnosis: R Tibia plateau fracture  POD#: non surgical  Mental Status: Alert and oriented x 2  Activity/dangle: Lift  Diet: Regular  Pain: Scheduled Tylenol, reapproached x 3. Refused PRN  Souza/Voiding: incontinent / purewick  Tele/Restraints/Iso: NA  02/LDA: RA / No IV access  D/C Date: Pending TCU  Other Info: Immobilizer RLE                     Refused AM meds

## 2025-02-01 NOTE — PLAN OF CARE
Trauma/Ortho/Medical (Choose one) : Ortho    Diagnosis:Right tibial plateau fracture.   POD#:Non surgical   Mental Status:baseline confused   Activity/dangle: up with 2/lift   Diet:regular diet  Pain:schedule tylenol,but pt refused her medication today,MD aware.  Souza/Voiding:Incontinence of bladder. Perineal care done.  Tele/Restraints/Iso:none  02/LDA:none  D/C Date:On Monday   Other Info:Right leg immobilizer on. Turn and repositioned . Encourage oral fluids.

## 2025-02-01 NOTE — PLAN OF CARE
Baseline confused. Refused cares and her medication. Tolerating diet. Turn and repositioned. Immobilizer on right leg. Incontinence of bladder. Up with 2 assist and lift. Discharge pending.Redness blanchable right posterior ankle, mepilex changed.

## 2025-02-02 PROCEDURE — 250N000013 HC RX MED GY IP 250 OP 250 PS 637: Performed by: PHYSICIAN ASSISTANT

## 2025-02-02 PROCEDURE — 250N000013 HC RX MED GY IP 250 OP 250 PS 637: Performed by: INTERNAL MEDICINE

## 2025-02-02 PROCEDURE — 99239 HOSP IP/OBS DSCHRG MGMT >30: CPT | Performed by: STUDENT IN AN ORGANIZED HEALTH CARE EDUCATION/TRAINING PROGRAM

## 2025-02-02 PROCEDURE — 120N000001 HC R&B MED SURG/OB

## 2025-02-02 RX ORDER — ASPIRIN 81 MG/1
162 TABLET ORAL DAILY
Qty: 42 TABLET | Refills: 0 | Status: SHIPPED | OUTPATIENT
Start: 2025-02-03 | End: 2025-02-24

## 2025-02-02 RX ADMIN — QUETIAPINE FUMARATE 25 MG: 25 TABLET ORAL at 21:31

## 2025-02-02 RX ADMIN — ACETAMINOPHEN 325 MG: 325 TABLET, FILM COATED ORAL at 13:47

## 2025-02-02 RX ADMIN — ACETAMINOPHEN 325 MG: 325 TABLET, FILM COATED ORAL at 21:30

## 2025-02-02 ASSESSMENT — ACTIVITIES OF DAILY LIVING (ADL)
ADLS_ACUITY_SCORE: 60
ADLS_ACUITY_SCORE: 64
ADLS_ACUITY_SCORE: 64
ADLS_ACUITY_SCORE: 60
ADLS_ACUITY_SCORE: 64
ADLS_ACUITY_SCORE: 60
ADLS_ACUITY_SCORE: 64
ADLS_ACUITY_SCORE: 60
ADLS_ACUITY_SCORE: 64
ADLS_ACUITY_SCORE: 60
ADLS_ACUITY_SCORE: 64

## 2025-02-02 NOTE — PROGRESS NOTES
2/1/2025 1900-0700    Diagnosis: R Tibia plateau fracture  POD#: non surgical  Mental Status: Alert and oriented x 2  Activity/dangle: Lift, assist of 1 with T/R  Diet: Regular  Pain:  denies, refused scheduled tylenol  Souza/Voiding: incontinent   Tele/Restraints/Iso: NA  02/LDA: RA / No IV access  D/C Date: Pending TCU  Other Info: Immobilizer RLE                     Refused AM meds

## 2025-02-02 NOTE — PROGRESS NOTES
[facility]    Medicine Progress Note - Hospitalist Service    Date of Admission:  1/12/2025    Assessment & Plan   Arline Link is a 84 year old, female, w a PMH of cognitive decline per family, rule out dementia, HTN, vitamin D deficiency, hypothyroidism, multiple falls, who was admitted on 1/12/2025, after another fall at home and found to have a right tibial plateau fracture on CT. Currently awaiting placement.       Fall with Closed fracture of right tibial plateau, initial encounter  Effusion of right knee  Weakness generalized    Falls frequently      Admitted with acute pain after a fall, history of frequent falls, such that family has a home camera.  X-ray suggested and CT confirmed tibial plateau fracture.  Orthopedics consulted.  Nonoperative.  Recommends nonweightbearing RLE, with walker use.  Unable to discharge home, safety concerns.      -- Non-WB Right LE with walker.  -- Keep KI in place at all times beyond hygiene and daily skin checks.  -- ASA 162mg daily x 6 weeks.   -- Follow-up x-rays in 2 weeks.  These can be done at facility and sent to surgeon.  -- Pain control with acetaminophen, note patient frequently declines.  PRN oxycodone available  -- Therapies following, patient has been intermittently resistant to cares and medication.    -- Family is moving patient to AL facility with wheel chair and hospital bed , DME was completed few days ago and equipment is now delivered to AL , they can take patient on Monday morning   -- Grand son is involved in helping manage discharge plan , patient is very close to her Grand son     Neuro cognitive impairment with behavioral disturbance  Acute metabolic encephalopathy on suspected worsening dementia    Per chart review Hx progressive neurocognitive impairment.  Lives alone.  Irritable, angry demanding and sarcastic outpatient at past visits.    Due to worsening cognition, appears doubtful, is not safe to return home safely  alone.      Daughter has been very worried about the patient's progressive fall is progressive cognitive impairment, with history favoring worsening dementia, and patient declining intervention/eval.  OT consulted for cognitive evaluation, patient refused this.  Concern about ability to make medical decisions.  She seems to be oriented, although continues to dispute details about her medical care (doesn't believe her leg is broken).  Grandselena Carrasco is the power of , patient is close to her Grand Son and is ok with him assisting in her discharge plan  CT head (screening/none recent) ordered, but patient refused and this was discontinued.  Vulnerable Adult report was filed on arrival    -- Continue Seroquel 25 mg at 4 PM and at bedtime on discharge , doing well on these doses   -- Plan to transition to AL on discharge   -- Hospital bed and wheel chair ordered and has been delivered to facility      Possible GI bleed, Low probability:    Notified by nursing the patient has had 2 loose dark black bowel movements on 1/14/25.    No abdominal pain or nausea.    Vitals okay.  Repeat hemoglobin stable.    Patient denied any prior history of GI bleed, although history may be limited due to suspected dementia.    --Subsequent hemoglobin has been stable around 14.5  --Stool for occult blood negative on 1/14/2025  --Continue empiric Protonix  --No clinical evidence of ongoing GI bleeding at the moment.  --Ppx aspirin resumed and tolerating well.     Hyponatremia: resolved      History of hypothyroidism: Continue PTA levothyroxine.     History of hypertension:  Losartan stopped during this stay due to soft BP     History of hypercholesterolemia: Not on statin , further management per PCP     Patient has been stable for discharge, awaiting placement.   is involved. Patient has a POA.  No new medical concerns over the last few days.    DME documentation completed ( see note from 01/29/25)    Plan for discharge  "tomorrow morning      Diet: Regular Diet Adult  Room Service  Diet    DVT Prophylaxis: Pneumatic Compression Devices  Souza Catheter: Not present  Lines: None     Cardiac Monitoring: None  Code Status: Full Code      Clinically Significant Risk Factors                   # Hypertension: Noted on problem list            # Overweight: Estimated body mass index is 26.05 kg/m  as calculated from the following:    Height as of this encounter: 1.6 m (5' 3\").    Weight as of this encounter: 66.7 kg (147 lb 0.8 oz).   # Moderate Malnutrition: based on nutrition assessment      # Financial/Environmental Concerns: none         Social Drivers of Health            Disposition Plan     Medically Ready for Discharge: Ready Now   DME paperwork completed for hospital bed and wheelchair management, unfortunately facility will not accept patient till Monday morning.      Kerri Santos MD  Hospitalist Service  [facility]  Securely message with [employer]Me (more info)  Text page via [employer] Paging/Directory   ______________________________________________________________________    Interval History     Patient was seen and examined, denying any new complaints.  Discussed with her regarding plan for early discharge tomorrow morning.  Patient is looking forward to discharge.  Cooperative with exam.    Physical Exam   Vital Signs: Temp: 98.2  F (36.8  C) Temp src: Oral BP: (!) 146/86 Pulse: 102   Resp: 16 SpO2: 92 % O2 Device: None (Room air)    Weight: 147 lbs .75 oz    Constitutional: Awake, alert, cooperative, no apparent distress  Respiratory: Clear to auscultation bilaterally, no crackles or wheezing  Cardiovascular: Regular rate and rhythm, normal S1 and S2, and no murmur noted  GI: Normal bowel sounds, soft, non-distended, non-tender  Other: Alert, oriented to self, otherwise cognitive delay and confusion which is baseline.    Medical Decision Making       40 MINUTES SPENT BY ME on the date of service doing chart " review, history, exam, documentation & further activities per the note.

## 2025-02-02 NOTE — DISCHARGE SUMMARY
"[facility]  Hospitalist Discharge Summary      Date of Admission:  1/12/2025  Date of Discharge:  2/3/2025  9:15 AM  Discharging Provider: Kerri Santos MD  Discharge Service: Hospitalist Service    Discharge Diagnoses     Closed fracture of right tibial plateau, initial encounter.  Mechanical fall.  Right knee effusion.  Generalized weakness.  Frequent falls.  Neurocognitive impairment with behavioral disturbance.  Advanced dementia.  Acute metabolic encephalopathy, resolved  Ruled out lower GI bleed.  Hyponatremia, resolved.  Hypothyroidism.  History of hypercholesterolemia and hypertension, stable without medications      Clinically Significant Risk Factors     # Overweight: Estimated body mass index is 26.05 kg/m  as calculated from the following:    Height as of this encounter: 1.6 m (5' 3\").    Weight as of this encounter: 66.7 kg (147 lb 0.8 oz).    # Moderate Malnutrition: based on nutrition assessment      Follow-ups Needed After Discharge   Follow-up Appointments       Hospital Follow-up with Existing Primary Care Provider (PCP)      Please see details below         Schedule Primary Care visit within: 14 Days       Follow Up      Follow-up with orthopedic surgery in 3 weeks                Unresulted Labs Ordered in the Past 30 Days of this Admission       No orders found from 12/13/2024 to 1/13/2025.            Discharge Disposition   Discharged to assisted living facility    Condition at discharge: Stable    Hospital Course     Arline Link is a 84 year old, female, w a PMH of cognitive decline per family, rule out dementia, HTN, vitamin D deficiency, hypothyroidism, multiple falls, who was admitted on 1/12/2025, after another fall at home and found to have a right tibial plateau fracture on CT. She underwent ORIF with ortho and reccommended to discharge to TCU    Here are further details regarding her current hospitalization        Fall with Closed fracture of right tibial " plateau, initial encounter  Effusion of right knee  Weakness generalized    Falls frequently   Admitted with acute pain after a fall, history of frequent falls, such that family has a home camera.  X-ray suggested and CT confirmed tibial plateau fracture.  Orthopedics consulted.  Nonoperative.  Recommends nonweightbearing RLE, with walker use.        -- Non-WB Right LE with walker.  -- Keep KI in place at all times beyond hygiene and daily skin checks.  -- ASA 162mg daily x 6 weeks. (3 weeks remaining on discharge  -- Follow-up x-rays in 3 weeks.  These can be done at facility and sent to surgeon.  -- Pain control with acetaminophen, note patient frequently declines.      Neuro cognitive impairment with behavioral disturbance  Acute metabolic encephalopathy on suspected worsening dementia  Per chart review Hx progressive neurocognitive impairment.  Lives alone.  Irritable, angry demanding and sarcastic outpatient at past visits.    Due to worsening cognition, appears doubtful, is not safe to return home safely alone.        Daughter has been very worried about the patient's progressive fall is progressive cognitive impairment, with history favoring worsening dementia, and patient declining intervention/eval.  OT consulted for cognitive evaluation, patient refused this.  Concern about ability to make medical decisions.  She seems to be oriented, although continues to dispute details about her medical care (doesn't believe her leg is broken).  Grandson Danilo is the power of , patient is close to her Grand Son and is ok with him assisting in her discharge plan  CT head (screening/none recent) ordered, but patient refused and this was discontinued.  Vulnerable Adult report was filed on arrival     -- Continue Seroquel 25 mg at 4 PM and at bedtime on discharge , doing well on these doses   -- Ordered Seroquel scheduled twice daily Seroquel and as needed dose for discharge  -- Plan to transition to AL on discharge    -- Hospital bed and wheel chair ordered and has been delivered to facility      Possible GI bleed, Low probability:     Notified by nursing the patient has had 2 loose dark black bowel movements on 1/14/25.    No abdominal pain or nausea.    Vitals okay.  Repeat hemoglobin stable.    Patient denied any prior history of GI bleed, although history may be limited due to suspected dementia.     --Subsequent hemoglobin has been stable around 14.5  --Stool for occult blood negative on 1/14/2025  --Continue empiric Protonix for 30 days after discharge, ordered  --No clinical evidence of ongoing GI bleeding at the moment.  --Ppx aspirin resumed and tolerating well.     Hyponatremia: resolved      History of hypothyroidism: Continue PTA levothyroxine.     History of hypertension:  Losartan stopped during this stay due to soft BP     History of hypercholesterolemia: Not on statin , further management per PCP    Patient was seen and examined on the day of discharge ,she is feeling well, does not have any complaints , I did review the discharge medications and instructions with the patient and plan for her to follow up with the PCP after the hospitalization .patient was in agreement , she is discharged in stable condition to her assisted living    Consultations This Hospital Stay   CARE MANAGEMENT / SOCIAL WORK IP CONSULT  PHYSICAL THERAPY ADULT IP CONSULT  PHYSICAL THERAPY ADULT IP CONSULT  OCCUPATIONAL THERAPY ADULT IP CONSULT  CARE MANAGEMENT / SOCIAL WORK IP CONSULT  ORTHOPEDIC SURGERY IP CONSULT  PHYSICAL THERAPY ADULT IP CONSULT  OCCUPATIONAL THERAPY ADULT IP CONSULT  WOUND OSTOMY CONTINENCE NURSE  IP CONSULT    Code Status   Full Code    Time Spent on this Encounter   IMey DO, personally saw the patient today and spent greater than 30 minutes discharging this patient.       Kerri Santos MD  [facility]  [address] [place] [id number]  Phone: [phone]  Fax:  "[phone]  ______________________________________________________________________    Physical Exam   Vital Signs: Temp: 98.1  F (36.7  C) Temp src: Oral BP: 128/71 Pulse: 81   Resp: 16 SpO2: 97 % O2 Device: None (Room air)    Weight: 147 lbs .75 oz         Primary Care Physician   Gonzales Ayoub    Discharge Orders      Primary Care - Care Coordination Referral      Home Care Referral      Reason for your hospital stay    You were admitted to the hospital secondary to fall causing right tibial fracture.  You were also evaluated by orthopedic surgery and you are recommended to undergo a non surgical intervention     Activity    Your activity upon discharge: activity as tolerated and no driving for today     Discharge Instructions    You were admitted to the hospital secondary to fall causing a right patellar fracture, you have been evaluated by orthopedic surgery and no surgery has been recommended.  You were also evaluated for cognitive decline.  You have been started on Seroquel which you are taking at 4 PM and at bedtime to help your anxiety.  As needed Seroquel is also available as needed for anxiety or agitation.  You will be discharged to assisted living facility, wheelchair and hospital bed has also been ordered.     Follow Up    Follow-up with orthopedic surgery in 3 weeks     Full Code     Hospital Bed    Hospital Bed Documentation:   Hospital bed is required for body positioning, to allow for safe transfers to wheelchair and standing and frequent changes in body position, not feasible in an ordinary bed     NOTE: Patient must have a \"Yes\" in one of the four following questions to qualify for a hospital bed.    1. Does the patient require positioning of the body in ways not feasible with an ordinary bed due to a medical condition that is expected to last at least 1 month? Yes (Please explain): Right patellar fracture    2. Does the patient require, for the alleviation of pain, positioning of the body in " ways not feasible with an ordinary bed? Yes (Please explain): Right patellar fracture, advanced dementia     3. Does the patient require the head of the bed to be elevated more than 30 degrees most of the time due to congestive heart failure, chronic pulmonary disease, or aspiration? No    4. Does the patient require traction that can only be attached to a hospital bed? No    Additional Criteria:    Does the patient require frequent changes in body position and/or have an immediate need for change in body position? Yes - Patient qualifies for Semi Electric Bed    (MRADLs) entirely, safely A mobility limitation that impairs the ability to accomplish mobility-related activities of daily living or within a reasonable time frame     Trapeze Criteria:  (Patient must meet standard hospital bed criteria also)   1. Does patient need this device to sit up because of a respiratory condition, for change in body position for other medical reasons, or to get in or out of bed? No    I, the undersigned, certify that the above prescribed supplies are medically necessary for this patient and is both reasonable and necessary in reference to accepted standards of medical and necessary in reference to accepted standards of medical practice in the treatment of this patient's condition and is not prescribed as a convenience.     Wheelchair    (MRADLs) entirely, safely A mobility limitation that impairs the ability to accomplish mobility-related activities of daily living or within a reasonable time frame     Diet    Follow this diet upon discharge: Orders Placed This Encounter      Snacks/Supplements Adult: Ensure Enlive; With Meals      Room Service      Regular Diet Adult       Hospital Follow-up with Existing Primary Care Provider (PCP)    Please see details below            Significant Results and Procedures   Results for orders placed or performed during the hospital encounter of 01/12/25   XR Knee Right 3 Views    Narrative     EXAM: XR KNEE RIGHT 3 VIEWS, XR TIBIA AND FIBULA RIGHT 2 VIEWS  LOCATION: [facility]  DATE: 1/12/2025    INDICATION: Fall, pain, concern for fracture  COMPARISON: None.      Impression    IMPRESSION:   Knee: Cortical irregularity posterior aspect tibial plateaus on lateral view is age indeterminate, more likely not acute. There is no associated joint effusion. Moderate medial and patellofemoral compartment degenerative arthritis. Diffuse bony   demineralization.     Tibia-fibula: No acute fracture. Diffuse bony demineralization.   XR Tibia and Fibula Right 2 Views    Narrative    EXAM: XR KNEE RIGHT 3 VIEWS, XR TIBIA AND FIBULA RIGHT 2 VIEWS  LOCATION: [facility]  DATE: 1/12/2025    INDICATION: Fall, pain, concern for fracture  COMPARISON: None.      Impression    IMPRESSION:   Knee: Cortical irregularity posterior aspect tibial plateaus on lateral view is age indeterminate, more likely not acute. There is no associated joint effusion. Moderate medial and patellofemoral compartment degenerative arthritis. Diffuse bony   demineralization.     Tibia-fibula: No acute fracture. Diffuse bony demineralization.   CT Knee Right w/o Contrast    Narrative    EXAM: CT KNEE RIGHT W/O CONTRAST  LOCATION: [facility]  DATE: 1/12/2025    INDICATION: Fall, evaluate for possible acute tibial plateau fracture.  COMPARISON: Same day radiograph.  TECHNIQUE: Noncontrast. Axial, sagittal and coronal thin-section reconstruction. Dose reduction techniques were used.     FINDINGS:     BONES:  -Bones are markedly demineralized which limits fracture detection.     -Acute to subacute mildly impacted fracture along the lateral tibial plateau which shows posterior sloping as seen on sagittal image 26. There is some impaction and curvilinear sclerosis. There is suggestion of the fracture extending across midline where   there is a vague longitudinal lucency through the  posteromedial tibial plateau seen on coronal image 34.    -Tricompartment degenerative changes right knee which are moderate in the medial compartment.    SOFT TISSUES:  -Small joint effusion with probable lipohemarthrosis. Mild scattered atherosclerotic vascular calcifications. Small amount of fluid/blood products along the posterior aspect of the knee. Small popliteal cyst with lipohemarthrosis.      Impression    IMPRESSION:  1.  Acute nondisplaced tibial plateau fracture most pronounced laterally where there is mild impaction along the posterolateral tibial plateau.    2.  Bones are markedly demineralized which limits fracture detection.    3.  Small joint effusion and popliteal cyst with lipohemarthrosis.    NOTE: ABNORMAL REPORT    THE DICTATION ABOVE DESCRIBES AN ABNORMALITY FOR WHICH FOLLOW-UP IS NEEDED.         Discharge Medications   Current Discharge Medication List        START taking these medications    Details   aspirin 81 MG EC tablet Take 2 tablets (162 mg) by mouth daily for 21 days.  Qty: 42 tablet, Refills: 0    Associated Diagnoses: Closed fracture of right tibial plateau, initial encounter      multivitamin w/minerals (THERA-VIT-M) tablet Take 1 tablet by mouth daily.  Qty: 30 tablet, Refills: 0    Associated Diagnoses: Closed fracture of right tibial plateau, initial encounter      pantoprazole (PROTONIX) 40 MG EC tablet Take 1 tablet (40 mg) by mouth every morning (before breakfast).  Qty: 30 tablet, Refills: 0    Associated Diagnoses: Closed fracture of right tibial plateau, initial encounter      polyethylene glycol (MIRALAX) 17 GM/Dose powder Take 17 g by mouth daily.  Qty: 510 g, Refills: 0    Associated Diagnoses: Closed fracture of right tibial plateau, initial encounter      pramoxine (PRAX) 1 % LOTN lotion Apply topically every 6 hours as needed.    Associated Diagnoses: History of dementia; Closed fracture of right tibial plateau, initial encounter      !! QUEtiapine (SEROQUEL) 25 MG  tablet Take 1 tablet (25 mg) by mouth every 6 hours as needed (1st prn for anxiety).  Qty: 10 tablet, Refills: 0    Associated Diagnoses: Closed fracture of right tibial plateau, initial encounter      !! QUEtiapine (SEROQUEL) 25 MG tablet Take 1 tablet (25 mg) by mouth daily at 4pm.  Qty: 30 tablet, Refills: 0    Associated Diagnoses: Closed fracture of right tibial plateau, initial encounter      !! QUEtiapine (SEROQUEL) 25 MG tablet Take 1 tablet (25 mg) by mouth at bedtime.  Qty: 30 tablet, Refills: 0    Associated Diagnoses: Closed fracture of right tibial plateau, initial encounter       !! - Potential duplicate medications found. Please discuss with provider.        CONTINUE these medications which have NOT CHANGED    Details   acetaminophen (TYLENOL) 325 MG tablet Take 325-650 mg by mouth every 6 hours as needed for mild pain      SYNTHROID 50 MCG tablet TAKE 1 TABLET(50 MCG) BY MOUTH DAILY  Qty: 90 tablet, Refills: 0    Comments: Please advise pt they are due for an appointment with their provider for further refills.  Associated Diagnoses: Hypothyroidism, unspecified type           STOP taking these medications       losartan (COZAAR) 50 MG tablet Comments:   Reason for Stopping:             Allergies   Allergies   Allergen Reactions    Sulfa Antibiotics Unknown    Tetracycline Rash

## 2025-02-02 NOTE — PROGRESS NOTES
Care Management Follow Up    Length of Stay (days): 21    Expected Discharge Date: 02/03/2025     Concerns to be Addressed: discharge planning     Patient plan of care discussed at interdisciplinary rounds: Yes    Anticipated Discharge Disposition: Skilled Nursing Facility              Anticipated Discharge Services: None  Anticipated Discharge DME: None    Patient/family educated on Medicare website which has current facility and service quality ratings: no  Education Provided on the Discharge Plan: Yes  Patient/Family in Agreement with the Plan: yes    Referrals Placed by CM/SW: Post Acute Facilities  Private pay costs discussed:     Discussed  Partnership in Safe Discharge Planning  document with patient/family: No     Handoff Completed: Yes, [facility] PCP: Internal handoff referral completed    Additional Information:  Wheelchair transport arranged for 8:40 am to 9:20 AM on Monday, Feb. 3rd.  Writer asked attending MD to complete as much as possible of the discharge orders since patient leaving at such an early time.  MD entered majority of discharge orders, as well as order for HHC.    Next Steps: Care coordinator on 2/3 to page MD right away in morning to complete orders so that they can be faxed to facility.    Stacy Jiang RN Care Coordinator  [facility]  [phone]

## 2025-02-02 NOTE — PLAN OF CARE
Baseline confusion. VSS,pain control with schedule tylenol. Tolerating regular diet. Right leg immobilizer on. Incontinence of bladder,redness blanchable coccyx,valeria care done per order. Turn and repositioned. Pt refused cares at times. Pt is discharging to TCU tomorrow between [id number] tomorrow.

## 2025-02-03 VITALS
OXYGEN SATURATION: 98 % | BODY MASS INDEX: 26.05 KG/M2 | DIASTOLIC BLOOD PRESSURE: 63 MMHG | WEIGHT: 147.05 LBS | SYSTOLIC BLOOD PRESSURE: 117 MMHG | RESPIRATION RATE: 16 BRPM | TEMPERATURE: 97.9 F | HEART RATE: 68 BPM | HEIGHT: 63 IN

## 2025-02-03 ASSESSMENT — ACTIVITIES OF DAILY LIVING (ADL)
ADLS_ACUITY_SCORE: 60
ADLS_ACUITY_SCORE: 64

## 2025-02-03 NOTE — PROGRESS NOTES
2/2/2025 [id number]     Diagnosis: R Tibia plateau fracture  POD#: non surgical  Mental Status: Alert and oriented x 2  Activity/dangle: Lift, assist of 1 with T/R  Diet: Regular  Pain:  denies, refused other meds  Souza/Voiding: incontinent   Tele/Restraints/Iso: NA  02/LDA: RA / No IV access  D/C Date: TCU today  Other Info: Immobilizer RLE                     Wheelchair transport arranged for 0840-0920H today, Monday.

## 2025-02-03 NOTE — PLAN OF CARE
Mental Status: Alert to self and place  Activity/dangle: Ast of 2 Lift  Diet: Regular diet  Pain: Denies pain  Souza/Voiding: Incontinent B&B  Tele/Restraints/Iso: N/A  02/LDA: Room air. No IV access  Other Info: Knee immobilizer on. All belongings packed and sent w/ pt. Gave copy of AVS and discharge medications. Discharge to TCU via wheelchair ride.

## 2025-02-04 ENCOUNTER — TELEPHONE (OUTPATIENT)
Dept: FAMILY MEDICINE | Facility: CLINIC | Age: 85
End: 2025-02-04
Payer: MEDICARE

## 2025-02-04 NOTE — TELEPHONE ENCOUNTER
Home Care is calling regarding an established patient with  [facility].        2/5/2024     1:15 PM   Home Care Information   Date of Home Care episode start 2/5/2024   Current following provider Dr. Ayoub   Date provider agreed to follow 1/30/2024    Name/Phone Number Marlena PHAN, [phone]   Home Care agency [facility]     Requesting orders from: Gonzales Ayoub  Provider is following patient: Yes  Is this a 60-day recertification request?  No    Orders Requested    Skilled Nursing  Request for initial certification (first set of orders)   Frequency:  1x/wk for 1 wks  Then once every 3 weeks for 3 weeks.    Physical Therapy  Request for initial evaluation and treatment (one time)     Occupational Therapy  Request for initial evaluation and treatment (one time)     Verbal orders given for PT, OT.  Information was gathered for Skilled Nursing.  Provider review needed.  RN will contact Home Care with information after provider review.  Confirmed ok to leave a detailed message with call back.  Contact information confirmed and updated as needed.    Joanne Roman RN

## 2025-02-10 ENCOUNTER — TELEPHONE (OUTPATIENT)
Dept: FAMILY MEDICINE | Facility: CLINIC | Age: 85
End: 2025-02-10
Payer: MEDICARE

## 2025-02-10 NOTE — TELEPHONE ENCOUNTER
Home Care is calling regarding an established patient with [facility].        2/5/2024     1:15 PM   Home Care Information   Date of Home Care episode start 2/5/2024   Current following provider Dr. Ayoub   Date provider agreed to follow 1/30/2024    Name/Phone Number Marlena PHAN, [phone]   Home Care agency [facility]     Requesting orders from: Gonzales Ayoub  Provider is following patient: Yes  Is this a 60-day recertification request?  No    Orders Requested    Physical Therapy  Request for initial certification (first set of orders)   Frequency:  1x/wk for 1 wks  then every other x/wk for 8 wks    Information was gathered and will be sent to provider for review.  RN will contact Home Care with information after provider review.  Confirmed ok to leave a detailed message with call back.  Contact information confirmed and updated as needed.    Michelle KELLEY Do RN

## 2025-02-10 NOTE — TELEPHONE ENCOUNTER
Left a message to home care PT informing them of the provider's approval of home care orders.     Clint Willingham RN  [facility]

## 2025-02-12 ENCOUNTER — TELEPHONE (OUTPATIENT)
Dept: FAMILY MEDICINE | Facility: CLINIC | Age: 85
End: 2025-02-12
Payer: MEDICARE

## 2025-02-12 NOTE — TELEPHONE ENCOUNTER
Home Care is calling regarding an established patient with [facility].  Requesting orders from: Gonzales Ayoub RN APPROVED: RN able to provide verbal orders.  Home Care will send orders for signature.  RN will close encounter.  Is this a request for a temporary pause in the home care episode?  No    Orders Requested    Occupational Therapy  Request for initial certification (first set of orders)   Frequency: 1x every other week for 4 weeks  RN gave verbal order: Yes          Isadora Waller RN

## 2025-02-18 ENCOUNTER — TELEPHONE (OUTPATIENT)
Dept: FAMILY MEDICINE | Facility: CLINIC | Age: 85
End: 2025-02-18
Payer: MEDICARE

## 2025-02-18 NOTE — TELEPHONE ENCOUNTER
Home Care is calling regarding an established patient with [facility].  Requesting orders from: Gonzales Ayoub RN APPROVED: RN able to provide verbal orders.  Home Care will send orders for signature.  RN will close encounter.  Is this a request for a temporary pause in the home care episode?  No    Orders Requested    Skilled Nursing  Request for initial certification (first set of orders)   Frequency: 1x a week for 3 weeks  RN gave verbal order: Yes          Isadora Waller RN